# Patient Record
Sex: MALE | Race: ASIAN | NOT HISPANIC OR LATINO | Employment: FULL TIME | URBAN - METROPOLITAN AREA
[De-identification: names, ages, dates, MRNs, and addresses within clinical notes are randomized per-mention and may not be internally consistent; named-entity substitution may affect disease eponyms.]

---

## 2019-12-16 ENCOUNTER — OFFICE VISIT (OUTPATIENT)
Dept: FAMILY MEDICINE CLINIC | Facility: CLINIC | Age: 39
End: 2019-12-16

## 2019-12-16 VITALS
OXYGEN SATURATION: 96 % | DIASTOLIC BLOOD PRESSURE: 100 MMHG | RESPIRATION RATE: 16 BRPM | BODY MASS INDEX: 52.49 KG/M2 | WEIGHT: 278 LBS | HEIGHT: 61 IN | HEART RATE: 120 BPM | TEMPERATURE: 98 F | SYSTOLIC BLOOD PRESSURE: 152 MMHG

## 2019-12-16 DIAGNOSIS — M25.572 CHRONIC PAIN OF BOTH ANKLES: ICD-10-CM

## 2019-12-16 DIAGNOSIS — G47.30 SLEEP APNEA, UNSPECIFIED TYPE: ICD-10-CM

## 2019-12-16 DIAGNOSIS — Z13.1 SCREENING FOR DIABETES MELLITUS: ICD-10-CM

## 2019-12-16 DIAGNOSIS — R06.2 WHEEZING: ICD-10-CM

## 2019-12-16 DIAGNOSIS — G89.29 CHRONIC PAIN OF BOTH ANKLES: ICD-10-CM

## 2019-12-16 DIAGNOSIS — I10 ESSENTIAL HYPERTENSION: Primary | ICD-10-CM

## 2019-12-16 DIAGNOSIS — J30.9 ALLERGIC RHINITIS, UNSPECIFIED SEASONALITY, UNSPECIFIED TRIGGER: ICD-10-CM

## 2019-12-16 DIAGNOSIS — M25.571 CHRONIC PAIN OF BOTH ANKLES: ICD-10-CM

## 2019-12-16 DIAGNOSIS — E66.01 MORBID OBESITY WITH BMI OF 50.0-59.9, ADULT (HCC): ICD-10-CM

## 2019-12-16 PROCEDURE — 94640 AIRWAY INHALATION TREATMENT: CPT | Performed by: PHYSICIAN ASSISTANT

## 2019-12-16 PROCEDURE — 1036F TOBACCO NON-USER: CPT | Performed by: PHYSICIAN ASSISTANT

## 2019-12-16 PROCEDURE — 99204 OFFICE O/P NEW MOD 45 MIN: CPT | Performed by: PHYSICIAN ASSISTANT

## 2019-12-16 PROCEDURE — 3725F SCREEN DEPRESSION PERFORMED: CPT | Performed by: PHYSICIAN ASSISTANT

## 2019-12-16 PROCEDURE — 3008F BODY MASS INDEX DOCD: CPT | Performed by: PHYSICIAN ASSISTANT

## 2019-12-16 RX ORDER — ALBUTEROL SULFATE 2.5 MG/3ML
2.5 SOLUTION RESPIRATORY (INHALATION) ONCE
Status: COMPLETED | OUTPATIENT
Start: 2019-12-16 | End: 2019-12-16

## 2019-12-16 RX ORDER — HYDROCHLOROTHIAZIDE 25 MG/1
25 TABLET ORAL DAILY
Qty: 90 TABLET | Refills: 3 | Status: SHIPPED | OUTPATIENT
Start: 2019-12-16 | End: 2021-01-18 | Stop reason: SDUPTHER

## 2019-12-16 RX ORDER — AMLODIPINE BESYLATE 10 MG/1
10 TABLET ORAL DAILY
Qty: 90 TABLET | Refills: 3 | Status: SHIPPED | OUTPATIENT
Start: 2019-12-16 | End: 2021-01-18 | Stop reason: SDUPTHER

## 2019-12-16 RX ORDER — NAPROXEN 500 MG/1
500 TABLET ORAL 2 TIMES DAILY PRN
Qty: 60 TABLET | Refills: 2 | Status: SHIPPED | OUTPATIENT
Start: 2019-12-16 | End: 2021-05-24 | Stop reason: SDUPTHER

## 2019-12-16 RX ORDER — MONTELUKAST SODIUM 10 MG/1
10 TABLET ORAL
Qty: 90 TABLET | Refills: 3 | Status: SHIPPED | OUTPATIENT
Start: 2019-12-16 | End: 2021-01-18 | Stop reason: SDUPTHER

## 2019-12-16 RX ORDER — ALBUTEROL SULFATE 90 UG/1
2 AEROSOL, METERED RESPIRATORY (INHALATION) EVERY 6 HOURS PRN
Qty: 1 INHALER | Refills: 1 | Status: SHIPPED | OUTPATIENT
Start: 2019-12-16 | End: 2020-04-21 | Stop reason: SDUPTHER

## 2019-12-16 RX ADMIN — ALBUTEROL SULFATE 2.5 MG: 2.5 SOLUTION RESPIRATORY (INHALATION) at 10:04

## 2019-12-16 NOTE — ASSESSMENT & PLAN NOTE
Refilled amlodipine and hydrochlorothiazide  Recommend following up in 1 week to recheck blood pressure to see if medication adjustment is needed

## 2019-12-16 NOTE — PROGRESS NOTES
Mini neb  Performed by: Purnima Cristobal MA  Authorized by: Christal Fischer PA-C     Number of treatments:  1  Treatment 1:   Pre-Procedure     Symptoms:  Wheezing and shortness of breath    Lung Sounds:  Wheezing    HR:  120    RR:  18    SP02:  96    Medication Administered:  Albuterol 2 5 mg  Post-Procedure     Symptoms:  Wheezing and shortness of breath    Lung sounds:  Clear to auscultation    HR:  116    RR:  18    SP02:  98

## 2019-12-16 NOTE — ASSESSMENT & PLAN NOTE
BMI Counseling: Body mass index is 52 53 kg/m²  The BMI is above normal  Nutrition recommendations include reducing portion sizes, decreasing overall calorie intake, 3-5 servings of fruits/vegetables daily, consuming healthier snacks, decreasing soda and/or juice intake, moderation in carbohydrate intake, increasing intake of lean protein, reducing intake of saturated fat and trans fat and reducing intake of cholesterol  Exercise recommendations include moderate aerobic physical activity for 150 minutes/week and exercising 3-5 times per week

## 2019-12-16 NOTE — PROGRESS NOTES
Assessment/Plan:    Essential hypertension  Refilled amlodipine and hydrochlorothiazide  Recommend following up in 1 week to recheck blood pressure to see if medication adjustment is needed  Chronic pain of both ankles  No significant change in symptoms  Recommend continuing with naproxen at this time  Did offer referral to Podiatry, patient states that he wants to get his blood pressure under control, improve his weight, and be evaluated for sleep apnea before he will follow up with Podiatry  Morbid obesity with BMI of 50 0-59 9, adult ([facility])  BMI Counseling: Body mass index is 52 53 kg/m²  The BMI is above normal  Nutrition recommendations include reducing portion sizes, decreasing overall calorie intake, 3-5 servings of fruits/vegetables daily, consuming healthier snacks, decreasing soda and/or juice intake, moderation in carbohydrate intake, increasing intake of lean protein, reducing intake of saturated fat and trans fat and reducing intake of cholesterol  Exercise recommendations include moderate aerobic physical activity for 150 minutes/week and exercising 3-5 times per week  Mild wheezing was noted on physical exam and cleared with albuterol treatment  Because of his history of allergic rhinitis, will send over prescription for Singulair to see if this does help with allergy, and possible asthma symptoms  Will also send over rescue inhaler to be used as needed  If it seems like rescue inhaler is being used on a daily basis, may have to start on a maintenance medication  Diagnoses and all orders for this visit:    Essential hypertension  -     CBC and differential; Future  -     Comprehensive metabolic panel; Future  -     Lipid panel; Future  -     Microalbumin / creatinine urine ratio; Future  -     amLODIPine (NORVASC) 10 mg tablet; Take 1 tablet (10 mg total) by mouth daily  -     hydrochlorothiazide (HYDRODIURIL) 25 mg tablet;  Take 1 tablet (25 mg total) by mouth daily    Wheezing  - albuterol inhalation solution 2 5 mg  -     albuterol (VENTOLIN HFA) 90 mcg/act inhaler; Inhale 2 puffs every 6 (six) hours as needed for wheezing  -     Mini neb    Sleep apnea, unspecified type  -     Ambulatory referral to Sleep Medicine; Future    Chronic pain of both ankles  -     naproxen (NAPROSYN) 500 mg tablet; Take 1 tablet (500 mg total) by mouth 2 (two) times a day as needed for moderate pain    Allergic rhinitis, unspecified seasonality, unspecified trigger  -     montelukast (SINGULAIR) 10 mg tablet; Take 1 tablet (10 mg total) by mouth daily at bedtime    Screening for diabetes mellitus  -     Hemoglobin A1C; Future    Morbid obesity with BMI of 50 0-59 9, adult ([facility])          Subjective:      Patient ID: Ulises Palomares is a 42 y o  male  24-year-old male presenting to establish care  Patient has been diagnosed with hypertension in the past   Has been prescribed amlodipine 10 mg daily, hydrochlorothiazide 25 mg daily in the past   States it has been several months since he has been on medication  Does not recall the last time he saw a medical provider  Blood pressure was controlled with current medication regimen  Patient does admit to poor dietary habits  Currently his wife is being evaluated for bariatric surgery, and he is following her diet plan  Tries exercise when he can, but does have concerns with chronic ankle pain  States that pain has been going on since he was 15years old  Comes and goes  Has followed up with Podiatry in the past, and they wanted to do surgery, but patient declined  Currently uses naproxen as needed which she states helps with the pain  Patient has also been diagnosed with allergic rhinitis  States he has tried Claritin and Flonase in the past   Over the last couple weeks he has noticed an increase in shortness of breath and wheezing  States that symptoms have been getting better  Did use nebulizer treatment which did help with his breathing    Has never been diagnosed with asthma  The following portions of the patient's history were reviewed and updated as appropriate:   He  has a past medical history of Hypertension  He   Patient Active Problem List    Diagnosis Date Noted    Essential hypertension 12/16/2019    Sleep apnea 12/16/2019    Allergic rhinitis 12/16/2019    Morbid obesity with BMI of 50 0-59 9, adult ([facility] [address] ) 12/16/2019    Chronic pain of both ankles 12/16/2019     He  has no past surgical history on file  His family history is not on file  He  reports that he has never smoked  He has never used smokeless tobacco  He reports that he does not drink alcohol or use drugs  Current Outpatient Medications   Medication Sig Dispense Refill    albuterol (VENTOLIN HFA) 90 mcg/act inhaler Inhale 2 puffs every 6 (six) hours as needed for wheezing 1 Inhaler 1    amLODIPine (NORVASC) 10 mg tablet Take 1 tablet (10 mg total) by mouth daily 90 tablet 3    hydrochlorothiazide (HYDRODIURIL) 25 mg tablet Take 1 tablet (25 mg total) by mouth daily 90 tablet 3    montelukast (SINGULAIR) 10 mg tablet Take 1 tablet (10 mg total) by mouth daily at bedtime 90 tablet 3    naproxen (NAPROSYN) 500 mg tablet Take 1 tablet (500 mg total) by mouth 2 (two) times a day as needed for moderate pain 60 tablet 2     No current facility-administered medications for this visit  He has No Known Allergies       Review of Systems   Constitutional: Negative for chills, diaphoresis, fatigue and fever  HENT: Positive for congestion, postnasal drip and rhinorrhea  Negative for ear pain, nosebleeds, sneezing and sore throat  Eyes: Negative for pain and visual disturbance  Respiratory: Positive for chest tightness, shortness of breath and wheezing  Negative for cough  Cardiovascular: Negative for chest pain, palpitations and leg swelling  Gastrointestinal: Negative for abdominal pain, diarrhea, nausea and vomiting     Endocrine: Negative for cold intolerance, heat intolerance, polydipsia, polyphagia and polyuria  Genitourinary: Negative for dysuria, flank pain and hematuria  Musculoskeletal: Positive for arthralgias  Negative for gait problem, joint swelling and neck stiffness  Skin: Negative for rash and wound  Neurological: Negative for dizziness, syncope, speech difficulty, weakness, numbness and headaches  Psychiatric/Behavioral: Negative for dysphoric mood and sleep disturbance  The patient is not nervous/anxious  Objective:      /100 (BP Location: Right arm, Patient Position: Sitting, Cuff Size: Large)   Pulse (!) 120   Temp 98 °F (36 7 °C) (Temporal)   Resp 16   Ht 5' 1" (1 549 m)   Wt 126 kg (278 lb)   SpO2 96%   BMI 52 53 kg/m²          Physical Exam   Constitutional: He is oriented to person, place, and time  He appears well-developed and well-nourished  No distress  HENT:   Right Ear: External ear normal    Left Ear: External ear normal    Nose: Mucosal edema present  Mouth/Throat: Oropharynx is clear and moist    Eyes: Pupils are equal, round, and reactive to light  Conjunctivae and EOM are normal    Neck: Normal range of motion  Neck supple  Cardiovascular: Normal rate, regular rhythm and normal heart sounds  Exam reveals no gallop and no friction rub  No murmur heard  Pulmonary/Chest: Effort normal  No respiratory distress  He has wheezes  He has no rales  Abdominal: Soft  Bowel sounds are normal  He exhibits no distension  There is no tenderness  There is no rebound and no guarding  Musculoskeletal: Normal range of motion  He exhibits no edema  Lymphadenopathy:     He has no cervical adenopathy  Neurological: He is alert and oriented to person, place, and time  He displays normal reflexes  No cranial nerve deficit  He exhibits normal muscle tone  Coordination normal    Skin: Skin is warm and dry  He is not diaphoretic  No erythema  Psychiatric: He has a normal mood and affect   His behavior is normal  Thought content normal    Nursing note and vitals reviewed

## 2019-12-16 NOTE — ASSESSMENT & PLAN NOTE
No significant change in symptoms  Recommend continuing with naproxen at this time  Did offer referral to Podiatry, patient states that he wants to get his blood pressure under control, improve his weight, and be evaluated for sleep apnea before he will follow up with Podiatry

## 2020-04-21 DIAGNOSIS — R06.2 WHEEZING: ICD-10-CM

## 2020-04-21 RX ORDER — ALBUTEROL SULFATE 90 UG/1
2 AEROSOL, METERED RESPIRATORY (INHALATION) EVERY 6 HOURS PRN
Qty: 1 INHALER | Refills: 1 | Status: SHIPPED | OUTPATIENT
Start: 2020-04-21 | End: 2020-07-13

## 2020-04-22 ENCOUNTER — TELEMEDICINE (OUTPATIENT)
Dept: FAMILY MEDICINE CLINIC | Facility: CLINIC | Age: 40
End: 2020-04-22

## 2020-04-22 VITALS — BODY MASS INDEX: 46.26 KG/M2 | HEIGHT: 61 IN | WEIGHT: 245 LBS

## 2020-04-22 DIAGNOSIS — J30.9 ALLERGIC RHINITIS, UNSPECIFIED SEASONALITY, UNSPECIFIED TRIGGER: Primary | ICD-10-CM

## 2020-04-22 DIAGNOSIS — J45.20 MILD INTERMITTENT ASTHMA WITHOUT COMPLICATION: ICD-10-CM

## 2020-04-22 PROCEDURE — G2012 BRIEF CHECK IN BY MD/QHP: HCPCS | Performed by: PHYSICIAN ASSISTANT

## 2020-04-22 RX ORDER — FLUTICASONE PROPIONATE 50 MCG
2 SPRAY, SUSPENSION (ML) NASAL DAILY
Qty: 1 BOTTLE | Refills: 11 | Status: SHIPPED | OUTPATIENT
Start: 2020-04-22 | End: 2022-04-12 | Stop reason: SDUPTHER

## 2020-05-28 ENCOUNTER — OFFICE VISIT (OUTPATIENT)
Dept: SLEEP CENTER | Facility: CLINIC | Age: 40
End: 2020-05-28
Payer: COMMERCIAL

## 2020-05-28 VITALS
HEIGHT: 61 IN | SYSTOLIC BLOOD PRESSURE: 128 MMHG | BODY MASS INDEX: 51.16 KG/M2 | HEART RATE: 100 BPM | DIASTOLIC BLOOD PRESSURE: 100 MMHG | WEIGHT: 271 LBS

## 2020-05-28 DIAGNOSIS — G47.30 SLEEP APNEA, UNSPECIFIED TYPE: ICD-10-CM

## 2020-05-28 PROCEDURE — 99203 OFFICE O/P NEW LOW 30 MIN: CPT | Performed by: INTERNAL MEDICINE

## 2020-05-28 PROCEDURE — 3074F SYST BP LT 130 MM HG: CPT | Performed by: INTERNAL MEDICINE

## 2020-05-28 PROCEDURE — 3080F DIAST BP >= 90 MM HG: CPT | Performed by: INTERNAL MEDICINE

## 2020-05-28 PROCEDURE — 3008F BODY MASS INDEX DOCD: CPT | Performed by: INTERNAL MEDICINE

## 2020-07-11 DIAGNOSIS — R06.2 WHEEZING: ICD-10-CM

## 2020-07-13 RX ORDER — ALBUTEROL SULFATE 90 UG/1
AEROSOL, METERED RESPIRATORY (INHALATION)
Qty: 8.5 G | Refills: 0 | Status: SHIPPED | OUTPATIENT
Start: 2020-07-13 | End: 2021-05-24 | Stop reason: SDUPTHER

## 2020-08-27 ENCOUNTER — HOSPITAL ENCOUNTER (OUTPATIENT)
Dept: SLEEP CENTER | Facility: CLINIC | Age: 40
Discharge: HOME | End: 2020-08-27
Payer: COMMERCIAL

## 2020-08-27 DIAGNOSIS — G47.30 SLEEP APNEA, UNSPECIFIED TYPE: ICD-10-CM

## 2020-08-27 PROCEDURE — 95810 POLYSOM 6/> YRS 4/> PARAM: CPT

## 2020-08-28 NOTE — PROGRESS NOTES
Sleep Study Documentation    Pre-Sleep Study       Sleep testing procedure explained to patient:YES    Patient napped prior to study:NO    [address] [place] worker after 12PM   Caffeine use:NO    Alcohol:Dayshift workers after 5PM: Alcohol use:NO    Typical day for patient:YES       Study Documentation    Sleep Study Indications:    Snoring   Nocturnal choking   Excessive daytime sleepiness   Hypertension    Sleep Study: Diagnostic       Snore: Moderate to loud at times    Supplemental O2: no    Minimum SaO2:  51%  Baseline SaO2:   89%    EKG abnormalities: yes (rare pause/skipped beat--see epochs 733, 903-04 for example)  EEG abnormalities: no    Sleep Study Recorded < 2 hours: N/A    Sleep Study Recorded > 2 hours but incomplete study: N/A    Sleep Study Recorded 6 hours but no sleep obtained: NO    Patient classification: employed       Post-Sleep Study    Medication used at bedtime or during sleep study:YES over the counter sleep aid and other prescription medications    Patient reports time it took to fall asleep:less than 20 minutes    Patient reports waking up during study:1 to 2 times  Patient reports returning to sleep without difficulty  Patient reports sleeping 4 to 6 hours with dreaming  Patient reports sleep during study:typical    Patient rated sleepiness: Somewhat sleepy or tired    PAP treatment:no

## 2020-09-01 DIAGNOSIS — G47.33 OSA (OBSTRUCTIVE SLEEP APNEA): Primary | ICD-10-CM

## 2020-09-03 ENCOUNTER — TELEPHONE (OUTPATIENT)
Dept: SLEEP CENTER | Facility: CLINIC | Age: 40
End: 2020-09-03

## 2020-09-03 DIAGNOSIS — Z20.822 ENCOUNTER FOR LABORATORY TESTING FOR COVID-19 VIRUS: Primary | ICD-10-CM

## 2020-09-03 NOTE — TELEPHONE ENCOUNTER
In my previous conversation with Laurance Essex, she would like to use [employer] [facility] equipment  I did investigate, and [employer] does not handle respiratory equipment in this area      Laurance Essex states they can use [place]  Scheduled DME set up after follow up with Dr Gongora Dear 11/25/2020  Compliance follow up scheduled 1/6/2021

## 2020-09-03 NOTE — TELEPHONE ENCOUNTER
Spoke with patients wife advised sleep study resulted, unfortunately I do not have permission to speak with her, she will give patient message to call      Sleep study reveals sever EVE, recommend titration study

## 2020-09-03 NOTE — TELEPHONE ENCOUNTER
received message from patient, ok to speak with his wife Ra Bergeron  I spoke with Ra Bergeron, advised above  States she already got a call and scheduled titration study  Discussed COVID protocol:  Patient agreeable to have COVID test on 11/6/2020, for PAP study on 11/16/2020  We ask that you limit interpersonal interactions as much as possible and observe all social distancing and masking precautions from the time of your testing to the time of your study      COVID testing order placed    COVID letter sent

## 2020-11-06 DIAGNOSIS — Z20.822 ENCOUNTER FOR LABORATORY TESTING FOR COVID-19 VIRUS: ICD-10-CM

## 2020-11-06 PROCEDURE — U0003 INFECTIOUS AGENT DETECTION BY NUCLEIC ACID (DNA OR RNA); SEVERE ACUTE RESPIRATORY SYNDROME CORONAVIRUS 2 (SARS-COV-2) (CORONAVIRUS DISEASE [COVID-19]), AMPLIFIED PROBE TECHNIQUE, MAKING USE OF HIGH THROUGHPUT TECHNOLOGIES AS DESCRIBED BY CMS-2020-01-R: HCPCS | Performed by: INTERNAL MEDICINE

## 2020-11-07 LAB — SARS-COV-2 RNA SPEC QL NAA+PROBE: NOT DETECTED

## 2020-11-16 ENCOUNTER — HOSPITAL ENCOUNTER (OUTPATIENT)
Dept: SLEEP CENTER | Facility: CLINIC | Age: 40
Discharge: HOME | End: 2020-11-16
Payer: COMMERCIAL

## 2020-11-16 DIAGNOSIS — G47.33 OSA (OBSTRUCTIVE SLEEP APNEA): ICD-10-CM

## 2020-11-16 PROCEDURE — 95811 POLYSOM 6/>YRS CPAP 4/> PARM: CPT

## 2020-11-21 DIAGNOSIS — G47.33 OSA (OBSTRUCTIVE SLEEP APNEA): Primary | ICD-10-CM

## 2020-11-23 ENCOUNTER — TELEPHONE (OUTPATIENT)
Dept: SLEEP CENTER | Facility: CLINIC | Age: 40
End: 2020-11-23

## 2020-11-25 ENCOUNTER — OFFICE VISIT (OUTPATIENT)
Dept: SLEEP CENTER | Facility: CLINIC | Age: 40
End: 2020-11-25
Payer: COMMERCIAL

## 2020-11-25 ENCOUNTER — TELEPHONE (OUTPATIENT)
Dept: SLEEP CENTER | Facility: CLINIC | Age: 40
End: 2020-11-25

## 2020-11-25 VITALS
SYSTOLIC BLOOD PRESSURE: 122 MMHG | WEIGHT: 271 LBS | BODY MASS INDEX: 51.16 KG/M2 | DIASTOLIC BLOOD PRESSURE: 84 MMHG | HEART RATE: 110 BPM | HEIGHT: 61 IN

## 2020-11-25 DIAGNOSIS — G47.33 OSA (OBSTRUCTIVE SLEEP APNEA): Primary | ICD-10-CM

## 2020-11-25 PROCEDURE — 3008F BODY MASS INDEX DOCD: CPT | Performed by: INTERNAL MEDICINE

## 2020-11-25 PROCEDURE — 99213 OFFICE O/P EST LOW 20 MIN: CPT | Performed by: INTERNAL MEDICINE

## 2020-11-25 PROCEDURE — 3079F DIAST BP 80-89 MM HG: CPT | Performed by: INTERNAL MEDICINE

## 2020-11-25 PROCEDURE — 3074F SYST BP LT 130 MM HG: CPT | Performed by: INTERNAL MEDICINE

## 2020-11-25 PROCEDURE — 1036F TOBACCO NON-USER: CPT | Performed by: INTERNAL MEDICINE

## 2021-01-06 ENCOUNTER — OFFICE VISIT (OUTPATIENT)
Dept: SLEEP CENTER | Facility: CLINIC | Age: 41
End: 2021-01-06
Payer: COMMERCIAL

## 2021-01-06 VITALS
HEIGHT: 61 IN | WEIGHT: 269 LBS | BODY MASS INDEX: 50.79 KG/M2 | SYSTOLIC BLOOD PRESSURE: 130 MMHG | HEART RATE: 97 BPM | DIASTOLIC BLOOD PRESSURE: 86 MMHG

## 2021-01-06 DIAGNOSIS — G47.33 OSA (OBSTRUCTIVE SLEEP APNEA): Primary | ICD-10-CM

## 2021-01-06 PROCEDURE — 1036F TOBACCO NON-USER: CPT | Performed by: INTERNAL MEDICINE

## 2021-01-06 PROCEDURE — 99213 OFFICE O/P EST LOW 20 MIN: CPT | Performed by: INTERNAL MEDICINE

## 2021-01-06 NOTE — PROGRESS NOTES
Progress Note - Sleep Center   Amanda Nelson :1980 MRN: [id number]      Reason for Visit:  47 y o male here for PAP compliance check    Assessment:  Doing well with new PAP device  His only problem is mask leak, due to his high pressure  Sleep quality is improved and the patient feels less drowsy  He no longer complains of drowsiness while driving  Compliance data show utilization for greater than or equal to 70% of nights, for greater than or equal to 4 hours per night  Plan:  Adequate compliance and successful treatment  [facility] will work on the problem of mask leak caused by high pressure  Follow up: One year    History of Present Illness:  History of EVE on PAP therapy  Meets adequate compliance  His only difficulty is mask leak which causes a whistling sound throughout the night  He is taking measures to keep his mask from leaking further  Review of Systems      Genitourinary none   Cardiology ankle/leg swelling   Gastrointestinal none   Neurology none   Constitutional none   Integumentary none   Psychiatry none   Musculoskeletal legs twitching/jerking   Pulmonary none   ENT none   Endocrine none   Hematological none           I have reviewed and updated the review of systems as necessary    Historical Information    Past Medical History:   Diagnosis Date    Hypertension          No past surgical history on file        Social History     Socioeconomic History    Marital status: /Civil Union     Spouse name: None    Number of children: None    Years of education: None    Highest education level: None   Occupational History    None   Social Needs    Financial resource strain: None    Food insecurity     Worry: None     Inability: None    Transportation needs     Medical: None     Non-medical: None   Tobacco Use    Smoking status: Never Smoker    Smokeless tobacco: Never Used   Substance and Sexual Activity    Alcohol use: No    Drug use: No    Sexual activity: None Lifestyle    Physical activity     Days per week: None     Minutes per session: None    Stress: None   Relationships    Social connections     Talks on phone: None     Gets together: None     Attends Shinto service: None     Active member of club or organization: None     Attends meetings of clubs or organizations: None     Relationship status: None    Intimate partner violence     Fear of current or ex partner: None     Emotionally abused: None     Physically abused: None     Forced sexual activity: None   Other Topics Concern    None   Social History Narrative    None         No family history on file  Medications/Allergies:      Current Outpatient Medications:     albuterol (PROVENTIL HFA,VENTOLIN HFA) 90 mcg/act inhaler, INHALE 2 PUFFS BY MOUTH EVERY 6 HOURS AS NEEDED FOR WHEEZING, Disp: 8 5 g, Rfl: 0    amLODIPine (NORVASC) 10 mg tablet, Take 1 tablet (10 mg total) by mouth daily, Disp: 90 tablet, Rfl: 3    fluticasone (FLONASE) 50 mcg/act nasal spray, 2 sprays into each nostril daily, Disp: 1 Bottle, Rfl: 11    hydrochlorothiazide (HYDRODIURIL) 25 mg tablet, Take 1 tablet (25 mg total) by mouth daily, Disp: 90 tablet, Rfl: 3    montelukast (SINGULAIR) 10 mg tablet, Take 1 tablet (10 mg total) by mouth daily at bedtime, Disp: 90 tablet, Rfl: 3    naproxen (NAPROSYN) 500 mg tablet, Take 1 tablet (500 mg total) by mouth 2 (two) times a day as needed for moderate pain, Disp: 60 tablet, Rfl: 2      Objective    Vital Signs:   Vitals:    01/06/21 0752   BP: 130/86   Pulse: 97     [place] Sleepiness Scale: Total score: 7        Physical Exam:    General: Alert, appropriate, cooperative, overweight    Head: NC/AT    Skin: Warm, dry    Neuro: No motor abnormalities, cranial nerves appear intact    Extremity: No clubbing, cyanosis    PAP setting:   BiPAP 25/21 cm  DME Provider:    [employer]  Test results:  AHI = 134 0    Counseling / Coordination of Care  Total clinic time spent today 10 minutes  A description of the counseling / coordination of care: Maintain compliance  Discussed equipment  RENNY Villarreal    Board Certified Sleep Specialist

## 2021-01-13 ENCOUNTER — TELEPHONE (OUTPATIENT)
Dept: FAMILY MEDICINE CLINIC | Facility: CLINIC | Age: 41
End: 2021-01-13

## 2021-01-18 ENCOUNTER — TELEPHONE (OUTPATIENT)
Dept: FAMILY MEDICINE CLINIC | Facility: CLINIC | Age: 41
End: 2021-01-18

## 2021-01-18 DIAGNOSIS — I10 ESSENTIAL HYPERTENSION: ICD-10-CM

## 2021-01-18 DIAGNOSIS — J30.9 ALLERGIC RHINITIS, UNSPECIFIED SEASONALITY, UNSPECIFIED TRIGGER: ICD-10-CM

## 2021-01-18 RX ORDER — HYDROCHLOROTHIAZIDE 25 MG/1
25 TABLET ORAL DAILY
Qty: 30 TABLET | Refills: 0 | Status: SHIPPED | OUTPATIENT
Start: 2021-01-18 | End: 2021-01-25

## 2021-01-18 RX ORDER — AMLODIPINE BESYLATE 10 MG/1
10 TABLET ORAL DAILY
Qty: 30 TABLET | Refills: 0 | Status: SHIPPED | OUTPATIENT
Start: 2021-01-18 | End: 2021-02-16

## 2021-01-18 RX ORDER — MONTELUKAST SODIUM 10 MG/1
10 TABLET ORAL
Qty: 30 TABLET | Refills: 0 | Status: SHIPPED | OUTPATIENT
Start: 2021-01-18 | End: 2021-02-16

## 2021-01-18 NOTE — TELEPHONE ENCOUNTER
Medications requested by patient's wife Amlodipine 10mg, hydrochlorothiazide 25mg, and montelukast 10mg

## 2021-01-18 NOTE — TELEPHONE ENCOUNTER
Meds were filled just this last time  Has not been seen here since April 2020  Does have appointment on 1/25/21  He is to keep that appointment   Thanks, Dr Annamarie Strauss

## 2021-01-25 ENCOUNTER — OFFICE VISIT (OUTPATIENT)
Dept: FAMILY MEDICINE CLINIC | Facility: CLINIC | Age: 41
End: 2021-01-25

## 2021-01-25 VITALS
TEMPERATURE: 97.4 F | HEART RATE: 105 BPM | BODY MASS INDEX: 50.39 KG/M2 | WEIGHT: 266.9 LBS | HEIGHT: 61 IN | DIASTOLIC BLOOD PRESSURE: 92 MMHG | RESPIRATION RATE: 18 BRPM | SYSTOLIC BLOOD PRESSURE: 138 MMHG | OXYGEN SATURATION: 99 %

## 2021-01-25 DIAGNOSIS — I10 ESSENTIAL HYPERTENSION: ICD-10-CM

## 2021-01-25 DIAGNOSIS — J45.20 MILD INTERMITTENT ASTHMA WITHOUT COMPLICATION: ICD-10-CM

## 2021-01-25 DIAGNOSIS — G89.29 CHRONIC PAIN OF BOTH ANKLES: ICD-10-CM

## 2021-01-25 DIAGNOSIS — Z00.00 ANNUAL PHYSICAL EXAM: ICD-10-CM

## 2021-01-25 DIAGNOSIS — Z23 ENCOUNTER FOR IMMUNIZATION: Primary | ICD-10-CM

## 2021-01-25 DIAGNOSIS — G47.30 SLEEP APNEA, UNSPECIFIED TYPE: ICD-10-CM

## 2021-01-25 DIAGNOSIS — M25.571 CHRONIC PAIN OF BOTH ANKLES: ICD-10-CM

## 2021-01-25 DIAGNOSIS — E66.01 MORBID OBESITY WITH BMI OF 50.0-59.9, ADULT (HCC): ICD-10-CM

## 2021-01-25 DIAGNOSIS — M25.572 CHRONIC PAIN OF BOTH ANKLES: ICD-10-CM

## 2021-01-25 PROCEDURE — 1036F TOBACCO NON-USER: CPT | Performed by: NURSE PRACTITIONER

## 2021-01-25 PROCEDURE — 3725F SCREEN DEPRESSION PERFORMED: CPT | Performed by: NURSE PRACTITIONER

## 2021-01-25 PROCEDURE — 3008F BODY MASS INDEX DOCD: CPT | Performed by: INTERNAL MEDICINE

## 2021-01-25 PROCEDURE — 3080F DIAST BP >= 90 MM HG: CPT | Performed by: NURSE PRACTITIONER

## 2021-01-25 PROCEDURE — 3075F SYST BP GE 130 - 139MM HG: CPT | Performed by: NURSE PRACTITIONER

## 2021-01-25 PROCEDURE — 99213 OFFICE O/P EST LOW 20 MIN: CPT | Performed by: NURSE PRACTITIONER

## 2021-01-25 PROCEDURE — 3008F BODY MASS INDEX DOCD: CPT | Performed by: NURSE PRACTITIONER

## 2021-01-25 RX ORDER — HYDROCHLOROTHIAZIDE 25 MG/1
50 TABLET ORAL DAILY
Qty: 30 TABLET | Refills: 0 | Status: SHIPPED | OUTPATIENT
Start: 2021-01-25 | End: 2021-02-12

## 2021-01-25 NOTE — ASSESSMENT & PLAN NOTE
Unstable, climbing steadily  At max CCB, will increase HCTZ  Pt agrees w/plan  Discussed need to lose weight and restrict salt

## 2021-01-25 NOTE — ASSESSMENT & PLAN NOTE
Pt ready to make a change, non-surgical   Wants diet information  Discussed all facets of BMI management plan as documented below

## 2021-01-25 NOTE — PATIENT INSTRUCTIONS
Weight Management   WHAT YOU NEED TO KNOW:   Being overweight increases your risk of health conditions such as heart disease, high blood pressure, type 2 diabetes, and certain types of cancer  It can also increase your risk for osteoarthritis, sleep apnea, and other respiratory problems  Aim for a slow, steady weight loss  Even a small amount of weight loss can lower your risk of health problems  DISCHARGE INSTRUCTIONS:   How to lose weight safely:  A safe and healthy way to lose weight is to eat fewer calories and get regular exercise  · You can lose up about 1 pound a week by decreasing the number of calories you eat by 500 calories each day  You can decrease calories by eating smaller portion sizes or by cutting out high-calorie foods  Read labels to find out how many calories are in the foods you eat  · You can also burn calories with exercise such as walking, swimming, or biking  You will be more likely to keep weight off if you make these changes part of your lifestyle  Exercise at least 30 minutes per day on most days of the week  You can also fit in more physical activity by taking the stairs instead of the elevator or parking farther away from stores  Ask your healthcare provider about the best exercise plan for you  Healthy meal plan for weight management:  A healthy meal plan includes a variety of foods, contains fewer calories, and helps you stay healthy  A healthy meal plan includes the following:     · Eat whole-grain foods more often  A healthy meal plan should contain fiber  Fiber is the part of grains, fruits, and vegetables that is not broken down by your body  Whole-grain foods are healthy and provide extra fiber in your diet  Some examples of whole-grain foods are whole-wheat breads and pastas, oatmeal, brown rice, and bulgur  · Eat a variety of vegetables every day  Include dark, leafy greens such as spinach, kale, kimani greens, and mustard greens   Eat yellow and orange vegetables such as carrots, sweet potatoes, and winter squash  · Eat a variety of fruits every day  Choose fresh or canned fruit (canned in its own juice or light syrup) instead of juice  Fruit juice has very little or no fiber  · Eat low-fat dairy foods  Drink fat-free (skim) milk or 1% milk  Eat fat-free yogurt and low-fat cottage cheese  Try low-fat cheeses such as mozzarella and other reduced-fat cheeses  · Choose meat and other protein foods that are low in fat  Choose beans or other legumes such as split peas or lentils  Choose fish, skinless poultry (chicken or turkey), or lean cuts of red meat (beef or pork)  Before you cook meat or poultry, cut off any visible fat  · Use less fat and oil  Try baking foods instead of frying them  Add less fat, such as margarine, sour cream, regular salad dressing, and mayonnaise to foods  Eat fewer high-fat foods  Some examples of high-fat foods include french fries, doughnuts, ice cream, and cakes  · Eat fewer sweets  Limit foods and drinks that are high in sugar  This includes candy, cookies, regular soda, and sweetened drinks  Ways to decrease calories:   · Eat smaller portions  ? Use a small plate with smaller servings  ? Do not eat second helpings  ? When you eat at a restaurant, ask for a box and place half of your meal in the box before you eat  ? Share an entrée with someone else  · Replace high-calorie snacks with healthy, low-calorie snacks  ? Choose fresh fruit, vegetables, fat-free rice cakes, or air-popped popcorn instead of potato chips, nuts, or chocolate  ? Choose water or calorie-free drinks instead of soda or sweetened drinks  · Do not shop for groceries when you are hungry  You may be more likely to make unhealthy food choices  Take a grocery list of healthy foods and shop after you have eaten  · Eat regular meals  Do not skip meals  Skipping meals can lead to overeating later in the day   This can make it harder for you to lose weight  Eat a healthy snack in place of a meal if you do not have time to eat a regular meal  Talk with a dietitian to help you create a meal plan and schedule that is right for you  Other things to consider as you try to lose weight:   · Be aware of situations that may give you the urge to overeat, such as eating while watching television  Find ways to avoid these situations  For example, read a book, go for a walk, or do crafts  · Meet with a weight loss support group or friends who are also trying to lose weight  This may help you stay motivated to continue working on your weight loss goals  © Copyright [address] Information is for End User's use only and may not be sold, redistributed or otherwise used for commercial purposes  All illustrations and images included in CareNotes® are the copyrighted property of [employer]  or [employer]   The above information is an  only  It is not intended as medical advice for individual conditions or treatments  Talk to your doctor, nurse or pharmacist before following any medical regimen to see if it is safe and effective for you  Wellness Visit for Adults   AMBULATORY CARE:   A wellness visit  is when you see your healthcare provider to get screened for health problems  Your healthcare provider will also give you advice on how to stay healthy  Write down your questions so you remember to ask them  Ask your healthcare provider how often you should have a wellness visit  What happens at a wellness visit:  Your healthcare provider will ask about your health, and your family history of health problems  This includes high blood pressure, heart disease, and cancer  He or she will ask if you have symptoms that concern you, if you smoke, and about your mood  You may also be asked about your intake of medicines, supplements, food, and alcohol  Any of the following may be done:  · Your weight  will be checked  Your height may also be checked so your body mass index (BMI) can be calculated  Your BMI shows if you are at a healthy weight  · Your blood pressure  and heart rate will be checked  Your temperature may also be checked  · Blood and urine tests  may be done  Blood tests may be done to check your cholesterol levels  Abnormal cholesterol levels increase your risk for heart disease and stroke  You may also need a blood or urine test to check for diabetes if you are at increased risk  Urine tests may be done to look for signs of an infection or kidney disease  · A physical exam  includes checking your heartbeat and lungs with a stethoscope  Your healthcare provider may also check your skin to look for sun damage  · Screening tests  may be recommended  A screening test is done to check for diseases that may not cause symptoms  The screening tests you may need depend on your age, gender, family history, and lifestyle habits  For example, colorectal screening may be recommended if you are 48years old or older  Screening tests you need if you are a woman:   · A Pap smear  is used to screen for cervical cancer  Pap smears are usually done every 3 to 5 years depending on your age  You may need them more often if you have had abnormal Pap smear test results in the past  Ask your healthcare provider how often you should have a Pap smear  · A mammogram  is an x-ray of your breasts to screen for breast cancer  Experts recommend mammograms every 2 years starting at age 48 years  You may need a mammogram at age 52 years or younger if you have an increased risk for breast cancer  Talk to your healthcare provider about when you should start having mammograms and how often you need them  Vaccines you may need:   · Get an influenza vaccine  every year  The influenza vaccine protects you from the flu  Several types of viruses cause the flu  The viruses change over time, so new vaccines are made each year      · Get a tetanus-diphtheria (Td) booster vaccine  every 10 years  This vaccine protects you against tetanus and diphtheria  Tetanus is a severe infection that may cause painful muscle spasms and lockjaw  Diphtheria is a severe bacterial infection that causes a thick covering in the back of your mouth and throat  · Get a human papillomavirus (HPV) vaccine  if you are female and aged 23 to 32 or male 23 to 24 and never received it  This vaccine protects you from HPV infection  HPV is the most common infection spread by sexual contact  HPV may also cause vaginal, penile, and anal cancers  · Get a pneumococcal vaccine  if you are aged 72 years or older  The pneumococcal vaccine is an injection given to protect you from pneumococcal disease  Pneumococcal disease is an infection caused by pneumococcal bacteria  The infection may cause pneumonia, meningitis, or an ear infection  · Get a shingles vaccine  if you are 60 or older, even if you have had shingles before  The shingles vaccine is an injection to protect you from the varicella-zoster virus  This is the same virus that causes chickenpox  Shingles is a painful rash that develops in people who had chickenpox or have been exposed to the virus  How to eat healthy:  My Plate is a model for planning healthy meals  It shows the types and amounts of foods that should go on your plate  Fruits and vegetables make up about half of your plate, and grains and protein make up the other half  A serving of dairy is included on the side of your plate  The amount of calories and serving sizes you need depends on your age, gender, weight, and height  Examples of healthy foods are listed below:  · Eat a variety of vegetables  such as dark green, red, and orange vegetables  You can also include canned vegetables low in sodium (salt) and frozen vegetables without added butter or sauces      · Eat a variety of fresh fruits , canned fruit in 100% juice, frozen fruit, and dried fruit     · Include whole grains  At least half of the grains you eat should be whole grains  Examples include whole-wheat bread, wheat pasta, brown rice, and whole-grain cereals such as oatmeal     · Eat a variety of protein foods such as seafood (fish and shellfish), lean meat, and poultry without skin (turkey and chicken)  Examples of lean meats include pork leg, shoulder, or tenderloin, and beef round, sirloin, tenderloin, and extra lean ground beef  Other protein foods include eggs and egg substitutes, beans, peas, soy products, nuts, and seeds  · Choose low-fat dairy products such as skim or 1% milk or low-fat yogurt, cheese, and cottage cheese  · Limit unhealthy fats  such as butter, hard margarine, and shortening  Exercise:  Exercise at least 30 minutes per day on most days of the week  Some examples of exercise include walking, biking, dancing, and swimming  You can also fit in more physical activity by taking the stairs instead of the elevator or parking farther away from stores  Include muscle strengthening activities 2 days each week  Regular exercise provides many health benefits  It helps you manage your weight, and decreases your risk for type 2 diabetes, heart disease, stroke, and high blood pressure  Exercise can also help improve your mood  Ask your healthcare provider about the best exercise plan for you  General health and safety guidelines:   · Do not smoke  Nicotine and other chemicals in cigarettes and cigars can cause lung damage  Ask your healthcare provider for information if you currently smoke and need help to quit  E-cigarettes or smokeless tobacco still contain nicotine  Talk to your healthcare provider before you use these products  · Limit alcohol  A drink of alcohol is 12 ounces of beer, 5 ounces of wine, or 1½ ounces of liquor  · Lose weight, if needed  Being overweight increases your risk of certain health conditions   These include heart disease, high blood pressure, type 2 diabetes, and certain types of cancer  · Protect your skin  Do not sunbathe or use tanning beds  Use sunscreen with a SPF 15 or higher  Apply sunscreen at least 15 minutes before you go outside  Reapply sunscreen every 2 hours  Wear protective clothing, hats, and sunglasses when you are outside  · Drive safely  Always wear your seatbelt  Make sure everyone in your car wears a seatbelt  A seatbelt can save your life if you are in an accident  Do not use your cell phone when you are driving  This could distract you and cause an accident  Pull over if you need to make a call or send a text message  · Practice safe sex  Use latex condoms if are sexually active and have more than one partner  Your healthcare provider may recommend screening tests for sexually transmitted infections (STIs)  · Wear helmets, lifejackets, and protective gear  Always wear a helmet when you ride a bike or motorcycle, go skiing, or play sports that could cause a head injury  Wear protective equipment when you play sports  Wear a lifejacket when you are on a boat or doing water sports  © Copyright [address] Information is for End User's use only and may not be sold, redistributed or otherwise used for commercial purposes  All illustrations and images included in CareNotes® are the copyrighted property of [employer]  or Manasa Arenas  The above information is an  only  It is not intended as medical advice for individual conditions or treatments  Talk to your doctor, nurse or pharmacist before following any medical regimen to see if it is safe and effective for you

## 2021-01-25 NOTE — ASSESSMENT & PLAN NOTE
Wears brace right ankle  Pain causes gait disturbance  Negative for edema, erythema  Posterior tibial pulse intact +3 bilaterally  Discussed activity better than no activity  Pt desires lifestyle changes/weight loss to get pain under control  No pharmacologic measures at this time

## 2021-01-25 NOTE — PROGRESS NOTES
Assessment/Plan:    Problem List Items Addressed This Visit        Respiratory    Sleep apnea     Stable  Cont CPAP and singulair  Relevant Orders    Ambulatory referral to Weight Management    Mild intermittent asthma without complication     Stable  Use inhaler PRN  Relevant Orders    Ambulatory referral to Weight Management       Cardiovascular and Mediastinum    Essential hypertension     Unstable, climbing steadily  At max CCB, will increase HCTZ  Pt agrees w/plan  Discussed need to lose weight and restrict salt  Relevant Medications    hydrochlorothiazide (HYDRODIURIL) 25 mg tablet    Other Relevant Orders    Ambulatory referral to Weight Management       Other    Morbid obesity with BMI of 50 0-59 9, adult ([facility] [address] )     Pt ready to make a change, non-surgical   Wants diet information  Discussed all facets of BMI management plan as documented below  Relevant Orders    Ambulatory referral to Weight Management    Chronic pain of both ankles     Wears brace right ankle  Pain causes gait disturbance  Negative for edema, erythema  Posterior tibial pulse intact +3 bilaterally  Discussed activity better than no activity  Pt desires lifestyle changes/weight loss to get pain under control  No pharmacologic measures at this time  Relevant Orders    Ambulatory referral to Weight Management      Other Visit Diagnoses     Encounter for immunization    -  Primary    Annual physical exam          I have recommended that this patient have a flu shot but he declines at this time  I have discussed the risks and benefits of this examination with him  The patient verbalizes understanding    Return in about 4 weeks (around 2/22/2021) for Annual physical and BP check       I have spent 25 minutes with Patient  today in which greater than 50% of this time was spent in counseling/coordination of care regarding Diagnostic results, Prognosis, Risks and benefits of tx options, Intructions for management, Patient and family education, Importance of tx compliance, Risk factor reductions and Impressions  Subjective:     HPI: Pieter Mercado is a 47 y o  male who  has a past medical history of Hypertension  who presented to the office today for a follow-up of hypertension  Patient is compliant with his hypertension medications  Patient denies chest, cough, shortness of breath, headache, lightheadedness, or syncope  Patient states his ankle pain is stable and does not report lower extremity edema  Patient does not take blood pressures at home  Patient states his asthma is stable, only at times requiring his rescue inhaler, but this is seldom  He has sleep apnea and is compliant with his CPAP machine  He is concerned about his weight and is enquiring about nonsurgical weight management options  He reports no pain, new medical conditions, injuries, or new illnesses at this time          The following portions of the patient's history were reviewed and updated as appropriate: allergies, current medications, past family history, past medical history, past social history, past surgical history and problem list     Current Outpatient Medications on File Prior to Visit   Medication Sig Dispense Refill    albuterol (PROVENTIL HFA,VENTOLIN HFA) 90 mcg/act inhaler INHALE 2 PUFFS BY MOUTH EVERY 6 HOURS AS NEEDED FOR WHEEZING 8 5 g 0    amLODIPine (NORVASC) 10 mg tablet Take 1 tablet (10 mg total) by mouth daily 30 tablet 0    fluticasone (FLONASE) 50 mcg/act nasal spray 2 sprays into each nostril daily 1 Bottle 11    montelukast (SINGULAIR) 10 mg tablet Take 1 tablet (10 mg total) by mouth daily at bedtime 30 tablet 0    naproxen (NAPROSYN) 500 mg tablet Take 1 tablet (500 mg total) by mouth 2 (two) times a day as needed for moderate pain 60 tablet 2    [DISCONTINUED] hydrochlorothiazide (HYDRODIURIL) 25 mg tablet Take 1 tablet (25 mg total) by mouth daily 30 tablet 0     No current facility-administered medications on file prior to visit  Review of Systems   Constitutional: Negative for activity change, chills, fatigue, fever and unexpected weight change  HENT: Negative for congestion, ear pain, hearing loss, rhinorrhea, sinus pressure and sore throat  Eyes: Negative for pain and visual disturbance  Respiratory: Negative for cough, shortness of breath and wheezing  Cardiovascular: Negative for chest pain, palpitations and leg swelling  Gastrointestinal: Negative for abdominal pain, nausea and vomiting  Genitourinary: Negative for dysuria and hematuria  Musculoskeletal: Negative for arthralgias, back pain, gait problem, joint swelling and myalgias  Skin: Negative for color change and rash  Neurological: Negative for dizziness, tremors, seizures, syncope, weakness, light-headedness and headaches  Psychiatric/Behavioral: Negative for agitation, behavioral problems, confusion, dysphoric mood, self-injury, sleep disturbance and suicidal ideas  The patient is not nervous/anxious  All other systems reviewed and are negative  BMI Counseling: Body mass index is 50 43 kg/m²  The BMI is above normal  Nutrition recommendations include decreasing portion sizes, encouraging healthy choices of fruits and vegetables, decreasing fast food intake, consuming healthier snacks, limiting drinks that contain sugar, moderation in carbohydrate intake, increasing intake of lean protein, reducing intake of saturated and trans fat and reducing intake of cholesterol  Exercise recommendations include moderate physical activity 150 minutes/week, exercising 3-5 times per week and obtaining a gym membership  No pharmacotherapy was ordered  Patient referred to weight management due to patient being overweight  Discussion in office    Referral to [facility] program           Objective:    /92 (BP Location: Left arm, Patient Position: Sitting, Cuff Size: Large)   Pulse 105   Temp (!) 97 4 °F (36 3 °C) (Temporal)   Resp 18   Ht 5' 1" (1 549 m)   Wt 121 kg (266 lb 14 4 oz)   SpO2 99%   BMI 50 43 kg/m²     Physical Exam  Constitutional:       Appearance: Normal appearance  He is obese  HENT:      Head: Normocephalic  Right Ear: Tympanic membrane, ear canal and external ear normal  There is no impacted cerumen  Left Ear: Tympanic membrane, ear canal and external ear normal  There is no impacted cerumen  Nose: Nose normal       Mouth/Throat:      Mouth: Mucous membranes are moist    Eyes:      Extraocular Movements: Extraocular movements intact  Conjunctiva/sclera: Conjunctivae normal       Pupils: Pupils are equal, round, and reactive to light  Neck:      Musculoskeletal: Normal range of motion  Cardiovascular:      Rate and Rhythm: Normal rate and regular rhythm  Pulses: Normal pulses  Heart sounds: Normal heart sounds  Pulmonary:      Effort: Pulmonary effort is normal       Breath sounds: Normal breath sounds  Abdominal:      General: Bowel sounds are normal       Palpations: Abdomen is soft  Tenderness: There is no abdominal tenderness  Musculoskeletal:         General: No signs of injury  Right ankle: He exhibits decreased range of motion  Tenderness  Left ankle: He exhibits decreased range of motion  Tenderness  Right lower leg: No edema  Left lower leg: No edema  Skin:     General: Skin is warm and dry  Capillary Refill: Capillary refill takes less than 2 seconds  Findings: No bruising, lesion or rash  Neurological:      General: No focal deficit present  Mental Status: He is alert and oriented to person, place, and time  Gait: Gait abnormal    Psychiatric:         Mood and Affect: Mood normal          Behavior: Behavior normal          Thought Content:  Thought content normal          KATARINA Pepper  01/25/21  2:02 PM    Patient Instructions     Weight Management   WHAT YOU NEED TO KNOW:   Being overweight increases your risk of health conditions such as heart disease, high blood pressure, type 2 diabetes, and certain types of cancer  It can also increase your risk for osteoarthritis, sleep apnea, and other respiratory problems  Aim for a slow, steady weight loss  Even a small amount of weight loss can lower your risk of health problems  DISCHARGE INSTRUCTIONS:   How to lose weight safely:  A safe and healthy way to lose weight is to eat fewer calories and get regular exercise  · You can lose up about 1 pound a week by decreasing the number of calories you eat by 500 calories each day  You can decrease calories by eating smaller portion sizes or by cutting out high-calorie foods  Read labels to find out how many calories are in the foods you eat  · You can also burn calories with exercise such as walking, swimming, or biking  You will be more likely to keep weight off if you make these changes part of your lifestyle  Exercise at least 30 minutes per day on most days of the week  You can also fit in more physical activity by taking the stairs instead of the elevator or parking farther away from stores  Ask your healthcare provider about the best exercise plan for you  Healthy meal plan for weight management:  A healthy meal plan includes a variety of foods, contains fewer calories, and helps you stay healthy  A healthy meal plan includes the following:     · Eat whole-grain foods more often  A healthy meal plan should contain fiber  Fiber is the part of grains, fruits, and vegetables that is not broken down by your body  Whole-grain foods are healthy and provide extra fiber in your diet  Some examples of whole-grain foods are whole-wheat breads and pastas, oatmeal, brown rice, and bulgur  · Eat a variety of vegetables every day  Include dark, leafy greens such as spinach, kale, kimani greens, and mustard greens   Eat yellow and orange vegetables such as carrots, sweet potatoes, and winter squash  · Eat a variety of fruits every day  Choose fresh or canned fruit (canned in its own juice or light syrup) instead of juice  Fruit juice has very little or no fiber  · Eat low-fat dairy foods  Drink fat-free (skim) milk or 1% milk  Eat fat-free yogurt and low-fat cottage cheese  Try low-fat cheeses such as mozzarella and other reduced-fat cheeses  · Choose meat and other protein foods that are low in fat  Choose beans or other legumes such as split peas or lentils  Choose fish, skinless poultry (chicken or turkey), or lean cuts of red meat (beef or pork)  Before you cook meat or poultry, cut off any visible fat  · Use less fat and oil  Try baking foods instead of frying them  Add less fat, such as margarine, sour cream, regular salad dressing, and mayonnaise to foods  Eat fewer high-fat foods  Some examples of high-fat foods include french fries, doughnuts, ice cream, and cakes  · Eat fewer sweets  Limit foods and drinks that are high in sugar  This includes candy, cookies, regular soda, and sweetened drinks  Ways to decrease calories:   · Eat smaller portions  ? Use a small plate with smaller servings  ? Do not eat second helpings  ? When you eat at a restaurant, ask for a box and place half of your meal in the box before you eat  ? Share an entrée with someone else  · Replace high-calorie snacks with healthy, low-calorie snacks  ? Choose fresh fruit, vegetables, fat-free rice cakes, or air-popped popcorn instead of potato chips, nuts, or chocolate  ? Choose water or calorie-free drinks instead of soda or sweetened drinks  · Do not shop for groceries when you are hungry  You may be more likely to make unhealthy food choices  Take a grocery list of healthy foods and shop after you have eaten  · Eat regular meals  Do not skip meals  Skipping meals can lead to overeating later in the day  This can make it harder for you to lose weight   Eat a healthy snack in place of a meal if you do not have time to eat a regular meal  Talk with a dietitian to help you create a meal plan and schedule that is right for you  Other things to consider as you try to lose weight:   · Be aware of situations that may give you the urge to overeat, such as eating while watching television  Find ways to avoid these situations  For example, read a book, go for a walk, or do crafts  · Meet with a weight loss support group or friends who are also trying to lose weight  This may help you stay motivated to continue working on your weight loss goals  © Copyright [address] Information is for End User's use only and may not be sold, redistributed or otherwise used for commercial purposes  All illustrations and images included in CareNotes® are the copyrighted property of [employer]  or [facility] Evelyn Schulz   The above information is an  only  It is not intended as medical advice for individual conditions or treatments  Talk to your doctor, nurse or pharmacist before following any medical regimen to see if it is safe and effective for you  Wellness Visit for Adults   AMBULATORY CARE:   A wellness visit  is when you see your healthcare provider to get screened for health problems  Your healthcare provider will also give you advice on how to stay healthy  Write down your questions so you remember to ask them  Ask your healthcare provider how often you should have a wellness visit  What happens at a wellness visit:  Your healthcare provider will ask about your health, and your family history of health problems  This includes high blood pressure, heart disease, and cancer  He or she will ask if you have symptoms that concern you, if you smoke, and about your mood  You may also be asked about your intake of medicines, supplements, food, and alcohol  Any of the following may be done:  · Your weight  will be checked   Your height may also be checked so your body mass index (BMI) can be calculated  Your BMI shows if you are at a healthy weight  · Your blood pressure  and heart rate will be checked  Your temperature may also be checked  · Blood and urine tests  may be done  Blood tests may be done to check your cholesterol levels  Abnormal cholesterol levels increase your risk for heart disease and stroke  You may also need a blood or urine test to check for diabetes if you are at increased risk  Urine tests may be done to look for signs of an infection or kidney disease  · A physical exam  includes checking your heartbeat and lungs with a stethoscope  Your healthcare provider may also check your skin to look for sun damage  · Screening tests  may be recommended  A screening test is done to check for diseases that may not cause symptoms  The screening tests you may need depend on your age, gender, family history, and lifestyle habits  For example, colorectal screening may be recommended if you are 48years old or older  Screening tests you need if you are a woman:   · A Pap smear  is used to screen for cervical cancer  Pap smears are usually done every 3 to 5 years depending on your age  You may need them more often if you have had abnormal Pap smear test results in the past  Ask your healthcare provider how often you should have a Pap smear  · A mammogram  is an x-ray of your breasts to screen for breast cancer  Experts recommend mammograms every 2 years starting at age 48 years  You may need a mammogram at age 52 years or younger if you have an increased risk for breast cancer  Talk to your healthcare provider about when you should start having mammograms and how often you need them  Vaccines you may need:   · Get an influenza vaccine  every year  The influenza vaccine protects you from the flu  Several types of viruses cause the flu  The viruses change over time, so new vaccines are made each year      · Get a tetanus-diphtheria (Td) booster vaccine  every 10 years  This vaccine protects you against tetanus and diphtheria  Tetanus is a severe infection that may cause painful muscle spasms and lockjaw  Diphtheria is a severe bacterial infection that causes a thick covering in the back of your mouth and throat  · Get a human papillomavirus (HPV) vaccine  if you are female and aged 23 to 32 or male 23 to 24 and never received it  This vaccine protects you from HPV infection  HPV is the most common infection spread by sexual contact  HPV may also cause vaginal, penile, and anal cancers  · Get a pneumococcal vaccine  if you are aged 72 years or older  The pneumococcal vaccine is an injection given to protect you from pneumococcal disease  Pneumococcal disease is an infection caused by pneumococcal bacteria  The infection may cause pneumonia, meningitis, or an ear infection  · Get a shingles vaccine  if you are 60 or older, even if you have had shingles before  The shingles vaccine is an injection to protect you from the varicella-zoster virus  This is the same virus that causes chickenpox  Shingles is a painful rash that develops in people who had chickenpox or have been exposed to the virus  How to eat healthy:  My Plate is a model for planning healthy meals  It shows the types and amounts of foods that should go on your plate  Fruits and vegetables make up about half of your plate, and grains and protein make up the other half  A serving of dairy is included on the side of your plate  The amount of calories and serving sizes you need depends on your age, gender, weight, and height  Examples of healthy foods are listed below:  · Eat a variety of vegetables  such as dark green, red, and orange vegetables  You can also include canned vegetables low in sodium (salt) and frozen vegetables without added butter or sauces  · Eat a variety of fresh fruits , canned fruit in 100% juice, frozen fruit, and dried fruit  · Include whole grains    At least half of the grains you eat should be whole grains  Examples include whole-wheat bread, wheat pasta, brown rice, and whole-grain cereals such as oatmeal     · Eat a variety of protein foods such as seafood (fish and shellfish), lean meat, and poultry without skin (turkey and chicken)  Examples of lean meats include pork leg, shoulder, or tenderloin, and beef round, sirloin, tenderloin, and extra lean ground beef  Other protein foods include eggs and egg substitutes, beans, peas, soy products, nuts, and seeds  · Choose low-fat dairy products such as skim or 1% milk or low-fat yogurt, cheese, and cottage cheese  · Limit unhealthy fats  such as butter, hard margarine, and shortening  Exercise:  Exercise at least 30 minutes per day on most days of the week  Some examples of exercise include walking, biking, dancing, and swimming  You can also fit in more physical activity by taking the stairs instead of the elevator or parking farther away from stores  Include muscle strengthening activities 2 days each week  Regular exercise provides many health benefits  It helps you manage your weight, and decreases your risk for type 2 diabetes, heart disease, stroke, and high blood pressure  Exercise can also help improve your mood  Ask your healthcare provider about the best exercise plan for you  General health and safety guidelines:   · Do not smoke  Nicotine and other chemicals in cigarettes and cigars can cause lung damage  Ask your healthcare provider for information if you currently smoke and need help to quit  E-cigarettes or smokeless tobacco still contain nicotine  Talk to your healthcare provider before you use these products  · Limit alcohol  A drink of alcohol is 12 ounces of beer, 5 ounces of wine, or 1½ ounces of liquor  · Lose weight, if needed  Being overweight increases your risk of certain health conditions   These include heart disease, high blood pressure, type 2 diabetes, and certain types of cancer  · Protect your skin  Do not sunbathe or use tanning beds  Use sunscreen with a SPF 15 or higher  Apply sunscreen at least 15 minutes before you go outside  Reapply sunscreen every 2 hours  Wear protective clothing, hats, and sunglasses when you are outside  · Drive safely  Always wear your seatbelt  Make sure everyone in your car wears a seatbelt  A seatbelt can save your life if you are in an accident  Do not use your cell phone when you are driving  This could distract you and cause an accident  Pull over if you need to make a call or send a text message  · Practice safe sex  Use latex condoms if are sexually active and have more than one partner  Your healthcare provider may recommend screening tests for sexually transmitted infections (STIs)  · Wear helmets, lifejackets, and protective gear  Always wear a helmet when you ride a bike or motorcycle, go skiing, or play sports that could cause a head injury  Wear protective equipment when you play sports  Wear a lifejacket when you are on a boat or doing water sports  © Copyright [address] Information is for End User's use only and may not be sold, redistributed or otherwise used for commercial purposes  All illustrations and images included in CareNotes® are the copyrighted property of [employer]  or [facility] Evelyn Schulz   The above information is an  only  It is not intended as medical advice for individual conditions or treatments  Talk to your doctor, nurse or pharmacist before following any medical regimen to see if it is safe and effective for you

## 2021-02-12 DIAGNOSIS — I10 ESSENTIAL HYPERTENSION: ICD-10-CM

## 2021-02-12 RX ORDER — HYDROCHLOROTHIAZIDE 25 MG/1
TABLET ORAL
Qty: 30 TABLET | Refills: 0 | Status: SHIPPED | OUTPATIENT
Start: 2021-02-12 | End: 2021-02-16

## 2021-02-15 DIAGNOSIS — I10 ESSENTIAL HYPERTENSION: ICD-10-CM

## 2021-02-15 DIAGNOSIS — J30.9 ALLERGIC RHINITIS, UNSPECIFIED SEASONALITY, UNSPECIFIED TRIGGER: ICD-10-CM

## 2021-02-16 RX ORDER — AMLODIPINE BESYLATE 10 MG/1
TABLET ORAL
Qty: 30 TABLET | Refills: 0 | Status: SHIPPED | OUTPATIENT
Start: 2021-02-16 | End: 2021-03-17

## 2021-02-16 RX ORDER — MONTELUKAST SODIUM 10 MG/1
TABLET ORAL
Qty: 30 TABLET | Refills: 0 | Status: SHIPPED | OUTPATIENT
Start: 2021-02-16 | End: 2021-03-17

## 2021-02-16 RX ORDER — HYDROCHLOROTHIAZIDE 25 MG/1
TABLET ORAL
Qty: 30 TABLET | Refills: 0 | Status: SHIPPED | OUTPATIENT
Start: 2021-02-16 | End: 2021-03-02

## 2021-02-22 ENCOUNTER — OFFICE VISIT (OUTPATIENT)
Dept: FAMILY MEDICINE CLINIC | Facility: CLINIC | Age: 41
End: 2021-02-22

## 2021-02-22 VITALS
SYSTOLIC BLOOD PRESSURE: 134 MMHG | OXYGEN SATURATION: 98 % | TEMPERATURE: 97.6 F | HEART RATE: 114 BPM | HEIGHT: 61 IN | WEIGHT: 265 LBS | RESPIRATION RATE: 18 BRPM | BODY MASS INDEX: 50.03 KG/M2 | DIASTOLIC BLOOD PRESSURE: 90 MMHG

## 2021-02-22 DIAGNOSIS — Z00.00 ANNUAL PHYSICAL EXAM: ICD-10-CM

## 2021-02-22 DIAGNOSIS — Z23 ENCOUNTER FOR IMMUNIZATION: ICD-10-CM

## 2021-02-22 DIAGNOSIS — Z00.00 HEALTHCARE MAINTENANCE: ICD-10-CM

## 2021-02-22 DIAGNOSIS — I10 ESSENTIAL HYPERTENSION: ICD-10-CM

## 2021-02-22 DIAGNOSIS — M25.572 CHRONIC PAIN OF BOTH ANKLES: ICD-10-CM

## 2021-02-22 DIAGNOSIS — E66.01 MORBID OBESITY WITH BMI OF 50.0-59.9, ADULT (HCC): Primary | ICD-10-CM

## 2021-02-22 DIAGNOSIS — J45.20 MILD INTERMITTENT ASTHMA WITHOUT COMPLICATION: ICD-10-CM

## 2021-02-22 DIAGNOSIS — G89.29 CHRONIC PAIN OF BOTH ANKLES: ICD-10-CM

## 2021-02-22 DIAGNOSIS — G47.30 SLEEP APNEA, UNSPECIFIED TYPE: ICD-10-CM

## 2021-02-22 DIAGNOSIS — M25.571 CHRONIC PAIN OF BOTH ANKLES: ICD-10-CM

## 2021-02-22 PROCEDURE — 99395 PREV VISIT EST AGE 18-39: CPT | Performed by: NURSE PRACTITIONER

## 2021-02-22 PROCEDURE — 1036F TOBACCO NON-USER: CPT | Performed by: NURSE PRACTITIONER

## 2021-02-22 NOTE — PROGRESS NOTES
[address] [facility] [place]    NAME: Talia Hatfield  AGE: 47 y o  SEX: male  : 1980     DATE: 2021     Assessment and Plan:     Problem List Items Addressed This Visit        Respiratory    Sleep apnea     Followed w/sleep medicine  Found severe apnea, currently on BiPAP and compliant  Endorses improvement in sleep  Mild intermittent asthma without complication     1-2 exacerbations in last month  Cont w/rescue inhaler  Lose weight  Cardiovascular and Mediastinum    Essential hypertension     Controlled  Cont current regimen, lose weight  Other    Morbid obesity with BMI of 50 0-59 9, adult ([facility] [address] ) - Primary     Anticipitory Guidance: Discussed diet - eating whole grains, lean meat, avoiding fast food, high-fat meats, and sugary beverages  A poor diet may lead to high cholesterol or prediabetes  We discussed strive-for-five fruits and vegetables a day  Increase exercise  Patient acknowledges understanding   -See BMI plan below   -appt w/bariatrics 3/1/21         Chronic pain of both ankles     Pulses and sensation intact, no edema or erythema  Continue wt loss, declines PT or ortho referral (tried in past)  Other Visit Diagnoses     Annual physical exam        Encounter for immunization        Healthcare maintenance        Relevant Orders    CBC and differential    Basic metabolic panel    Hemoglobin A1C    Lipid panel    TSH, 3rd generation with Free T4 reflex          Immunizations and preventive care screenings were discussed with patient today  Appropriate education was printed on patient's after visit summary  Counseling:  Alcohol/drug use: discussed moderation in alcohol intake, the recommendations for healthy alcohol use, and avoidance of illicit drug use  Dental Health: discussed importance of regular tooth brushing, flossing, and dental visits    Injury prevention: discussed safety/seat belts, safety helmets, smoke detectors, carbon dioxide detectors, and smoking near bedding or upholstery  Sexual health: discussed sexually transmitted diseases, partner selection, use of condoms, avoidance of unintended pregnancy, and contraceptive alternatives  · Exercise: the importance of regular exercise/physical activity was discussed  Recommend exercise 3-5 times per week for at least 30 minutes  BMI Counseling: Body mass index is 50 07 kg/m²  The BMI is above normal  Nutrition recommendations include decreasing portion sizes, encouraging healthy choices of fruits and vegetables, decreasing fast food intake, consuming healthier snacks, limiting drinks that contain sugar, moderation in carbohydrate intake, increasing intake of lean protein, reducing intake of saturated and trans fat and reducing intake of cholesterol  Exercise recommendations include moderate physical activity 150 minutes/week, exercising 3-5 times per week and obtaining a gym membership  Patient referred to weight management due to patient being overweight  Return in about 3 months (around 5/22/2021)  Chief Complaint:     Chief Complaint   Patient presents with    Physical Exam      History of Present Illness:     Adult Annual Physical   Patient here for a comprehensive physical exam  The patient reports no problems  Patient is following up from visit 1 month ago  At that time his blood pressure medication was adjusted  Today he is controlled  He denies shortness of breath, syncope, chest pain, or peripheral edema  He was able to follow-up with sleep medicine  He was found to have severe sleep apnea  He is now on BiPAP and is compliant with his machine  He does now in dorsal refreshing sleep, is less tired during the day  Bilateral ankle and foot pain remains his only complaint  He states on a past he did see Ortho, had imaging, went to PT  All of this failed to help    We discussed that his BMI is likely the principal causative factor  He is scheduled for 1st visit with weight management on March 1st   He remains committed to lifestyle changes to diet exercise  He sees his dentist regularly and has an eye doctor's appointment after this visit today  He is due for blood work and acknowledges that he will get this done at the earliest opportunity  He declines the pneumonia or flu vaccine at this time  He has no new illnesses or health concerns at this time  Diet and Physical Activity  · Diet/Nutrition: well balanced diet  · Exercise: no formal exercise  Depression Screening  PHQ-9 Depression Screening    PHQ-9:   Frequency of the following problems over the past two weeks:           General Health  · Sleep: on BiPAP  · Hearing: normal - bilateral   · Vision: wears glasses  · Dental: regular dental visits  [facility]  · History of STDs?: yes  Review of Systems:     Review of Systems   Constitutional: Negative for activity change, chills, fatigue, fever and unexpected weight change  HENT: Negative for congestion, ear pain, hearing loss, rhinorrhea, sinus pressure and sore throat  Eyes: Negative for pain and visual disturbance  Respiratory: Negative for cough, shortness of breath and wheezing  Cardiovascular: Negative for chest pain, palpitations and leg swelling  Gastrointestinal: Negative for abdominal pain, nausea and vomiting  Genitourinary: Negative for dysuria and hematuria  Musculoskeletal: Positive for arthralgias  Negative for back pain, gait problem, joint swelling and myalgias  Skin: Negative for color change and rash  Neurological: Negative for dizziness, tremors, seizures, syncope, weakness, light-headedness and headaches  Psychiatric/Behavioral: Negative for agitation, behavioral problems, confusion, dysphoric mood, self-injury, sleep disturbance and suicidal ideas  The patient is not nervous/anxious      All other systems reviewed and are negative  Past Medical History:     Past Medical History:   Diagnosis Date    Hypertension       Past Surgical History:     History reviewed  No pertinent surgical history  Social History:        Social History     Socioeconomic History    Marital status: /Civil Union     Spouse name: None    Number of children: None    Years of education: None    Highest education level: None   Occupational History    None   Social Needs    Financial resource strain: None    Food insecurity     Worry: None     Inability: None    Transportation needs     Medical: None     Non-medical: None   Tobacco Use    Smoking status: Never Smoker    Smokeless tobacco: Never Used   Substance and Sexual Activity    Alcohol use: No    Drug use: No    Sexual activity: None   Lifestyle    Physical activity     Days per week: None     Minutes per session: None    Stress: None   Relationships    Social connections     Talks on phone: None     Gets together: None     Attends Yarsanism service: None     Active member of club or organization: None     Attends meetings of clubs or organizations: None     Relationship status: None    Intimate partner violence     Fear of current or ex partner: None     Emotionally abused: None     Physically abused: None     Forced sexual activity: None   Other Topics Concern    None   Social History Narrative    None      Family History:     History reviewed  No pertinent family history     Current Medications:     Current Outpatient Medications   Medication Sig Dispense Refill    albuterol (PROVENTIL HFA,VENTOLIN HFA) 90 mcg/act inhaler INHALE 2 PUFFS BY MOUTH EVERY 6 HOURS AS NEEDED FOR WHEEZING 8 5 g 0    amLODIPine (NORVASC) 10 mg tablet TAKE 1 TABLET(10 MG) BY MOUTH DAILY 30 tablet 0    fluticasone (FLONASE) 50 mcg/act nasal spray 2 sprays into each nostril daily 1 Bottle 11    hydrochlorothiazide (HYDRODIURIL) 25 mg tablet TAKE 1 TABLET(25 MG) BY MOUTH DAILY 30 tablet 0    montelukast (SINGULAIR) 10 mg tablet TAKE 1 TABLET(10 MG) BY MOUTH DAILY AT BEDTIME 30 tablet 0    naproxen (NAPROSYN) 500 mg tablet Take 1 tablet (500 mg total) by mouth 2 (two) times a day as needed for moderate pain 60 tablet 2     No current facility-administered medications for this visit  Allergies:     No Known Allergies   Physical Exam:     /90 (BP Location: Left arm, Patient Position: Sitting)   Pulse (!) 114   Temp 97 6 °F (36 4 °C) (Temporal)   Resp 18   Ht 5' 1" (1 549 m)   Wt 120 kg (265 lb)   SpO2 98%   BMI 50 07 kg/m²     Physical Exam  Vitals signs and nursing note reviewed  Constitutional:       Appearance: Normal appearance  He is well-developed  He is obese  HENT:      Head: Normocephalic and atraumatic  Right Ear: Tympanic membrane, ear canal and external ear normal  There is no impacted cerumen  Left Ear: Tympanic membrane, ear canal and external ear normal  There is no impacted cerumen  Nose: Nose normal       Mouth/Throat:      Mouth: Mucous membranes are moist    Eyes:      Extraocular Movements: Extraocular movements intact  Conjunctiva/sclera: Conjunctivae normal       Pupils: Pupils are equal, round, and reactive to light  Neck:      Musculoskeletal: Normal range of motion and neck supple  Cardiovascular:      Rate and Rhythm: Normal rate and regular rhythm  Pulses: Normal pulses  Heart sounds: Normal heart sounds  No murmur  Pulmonary:      Effort: Pulmonary effort is normal  No respiratory distress  Breath sounds: Normal breath sounds  Abdominal:      Palpations: Abdomen is soft  Tenderness: There is no abdominal tenderness  Musculoskeletal: Normal range of motion  Right lower leg: No edema  Left lower leg: No edema  Right foot: No swelling  Left foot: No swelling  Skin:     General: Skin is warm and dry  Capillary Refill: Capillary refill takes less than 2 seconds     Neurological: General: No focal deficit present  Mental Status: He is alert and oriented to person, place, and time     Psychiatric:         Mood and Affect: Mood normal          Behavior: Behavior normal           Gregorio Fletcher 36

## 2021-02-22 NOTE — ASSESSMENT & PLAN NOTE
Pulses and sensation intact, no edema or erythema  Continue wt loss, declines PT or ortho referral (tried in past)

## 2021-02-22 NOTE — ASSESSMENT & PLAN NOTE
Anticipitory Guidance: Discussed diet - eating whole grains, lean meat, avoiding fast food, high-fat meats, and sugary beverages  A poor diet may lead to high cholesterol or prediabetes  We discussed strive-for-five fruits and vegetables a day  Increase exercise    Patient acknowledges understanding   -See BMI plan below   -appt w/bariatrics 3/1/21

## 2021-02-22 NOTE — PATIENT INSTRUCTIONS
Wellness Visit for Adults   AMBULATORY CARE:   A wellness visit  is when you see your healthcare provider to get screened for health problems  Your healthcare provider will also give you advice on how to stay healthy  Write down your questions so you remember to ask them  Ask your healthcare provider how often you should have a wellness visit  What happens at a wellness visit:  Your healthcare provider will ask about your health, and your family history of health problems  This includes high blood pressure, heart disease, and cancer  He or she will ask if you have symptoms that concern you, if you smoke, and about your mood  You may also be asked about your intake of medicines, supplements, food, and alcohol  Any of the following may be done:  · Your weight  will be checked  Your height may also be checked so your body mass index (BMI) can be calculated  Your BMI shows if you are at a healthy weight  · Your blood pressure  and heart rate will be checked  Your temperature may also be checked  · Blood and urine tests  may be done  Blood tests may be done to check your cholesterol levels  Abnormal cholesterol levels increase your risk for heart disease and stroke  You may also need a blood or urine test to check for diabetes if you are at increased risk  Urine tests may be done to look for signs of an infection or kidney disease  · A physical exam  includes checking your heartbeat and lungs with a stethoscope  Your healthcare provider may also check your skin to look for sun damage  · Screening tests  may be recommended  A screening test is done to check for diseases that may not cause symptoms  The screening tests you may need depend on your age, gender, family history, and lifestyle habits  For example, colorectal screening may be recommended if you are 48years old or older  Screening tests you need if you are a woman:   · A Pap smear  is used to screen for cervical cancer   Pap smears are usually done every 3 to 5 years depending on your age  You may need them more often if you have had abnormal Pap smear test results in the past  Ask your healthcare provider how often you should have a Pap smear  · A mammogram  is an x-ray of your breasts to screen for breast cancer  Experts recommend mammograms every 2 years starting at age 48 years  You may need a mammogram at age 52 years or younger if you have an increased risk for breast cancer  Talk to your healthcare provider about when you should start having mammograms and how often you need them  Vaccines you may need:   · Get an influenza vaccine  every year  The influenza vaccine protects you from the flu  Several types of viruses cause the flu  The viruses change over time, so new vaccines are made each year  · Get a tetanus-diphtheria (Td) booster vaccine  every 10 years  This vaccine protects you against tetanus and diphtheria  Tetanus is a severe infection that may cause painful muscle spasms and lockjaw  Diphtheria is a severe bacterial infection that causes a thick covering in the back of your mouth and throat  · Get a human papillomavirus (HPV) vaccine  if you are female and aged 23 to 32 or male 23 to 24 and never received it  This vaccine protects you from HPV infection  HPV is the most common infection spread by sexual contact  HPV may also cause vaginal, penile, and anal cancers  · Get a pneumococcal vaccine  if you are aged 72 years or older  The pneumococcal vaccine is an injection given to protect you from pneumococcal disease  Pneumococcal disease is an infection caused by pneumococcal bacteria  The infection may cause pneumonia, meningitis, or an ear infection  · Get a shingles vaccine  if you are 60 or older, even if you have had shingles before  The shingles vaccine is an injection to protect you from the varicella-zoster virus  This is the same virus that causes chickenpox   Shingles is a painful rash that develops in people who had chickenpox or have been exposed to the virus  How to eat healthy:  My Plate is a model for planning healthy meals  It shows the types and amounts of foods that should go on your plate  Fruits and vegetables make up about half of your plate, and grains and protein make up the other half  A serving of dairy is included on the side of your plate  The amount of calories and serving sizes you need depends on your age, gender, weight, and height  Examples of healthy foods are listed below:  · Eat a variety of vegetables  such as dark green, red, and orange vegetables  You can also include canned vegetables low in sodium (salt) and frozen vegetables without added butter or sauces  · Eat a variety of fresh fruits , canned fruit in 100% juice, frozen fruit, and dried fruit  · Include whole grains  At least half of the grains you eat should be whole grains  Examples include whole-wheat bread, wheat pasta, brown rice, and whole-grain cereals such as oatmeal     · Eat a variety of protein foods such as seafood (fish and shellfish), lean meat, and poultry without skin (turkey and chicken)  Examples of lean meats include pork leg, shoulder, or tenderloin, and beef round, sirloin, tenderloin, and extra lean ground beef  Other protein foods include eggs and egg substitutes, beans, peas, soy products, nuts, and seeds  · Choose low-fat dairy products such as skim or 1% milk or low-fat yogurt, cheese, and cottage cheese  · Limit unhealthy fats  such as butter, hard margarine, and shortening  Exercise:  Exercise at least 30 minutes per day on most days of the week  Some examples of exercise include walking, biking, dancing, and swimming  You can also fit in more physical activity by taking the stairs instead of the elevator or parking farther away from stores  Include muscle strengthening activities 2 days each week  Regular exercise provides many health benefits   It helps you manage your weight, and decreases your risk for type 2 diabetes, heart disease, stroke, and high blood pressure  Exercise can also help improve your mood  Ask your healthcare provider about the best exercise plan for you  General health and safety guidelines:   · Do not smoke  Nicotine and other chemicals in cigarettes and cigars can cause lung damage  Ask your healthcare provider for information if you currently smoke and need help to quit  E-cigarettes or smokeless tobacco still contain nicotine  Talk to your healthcare provider before you use these products  · Limit alcohol  A drink of alcohol is 12 ounces of beer, 5 ounces of wine, or 1½ ounces of liquor  · Lose weight, if needed  Being overweight increases your risk of certain health conditions  These include heart disease, high blood pressure, type 2 diabetes, and certain types of cancer  · Protect your skin  Do not sunbathe or use tanning beds  Use sunscreen with a SPF 15 or higher  Apply sunscreen at least 15 minutes before you go outside  Reapply sunscreen every 2 hours  Wear protective clothing, hats, and sunglasses when you are outside  · Drive safely  Always wear your seatbelt  Make sure everyone in your car wears a seatbelt  A seatbelt can save your life if you are in an accident  Do not use your cell phone when you are driving  This could distract you and cause an accident  Pull over if you need to make a call or send a text message  · Practice safe sex  Use latex condoms if are sexually active and have more than one partner  Your healthcare provider may recommend screening tests for sexually transmitted infections (STIs)  · Wear helmets, lifejackets, and protective gear  Always wear a helmet when you ride a bike or motorcycle, go skiing, or play sports that could cause a head injury  Wear protective equipment when you play sports  Wear a lifejacket when you are on a boat or doing water sports      © Copyright [employer] 2020 Information is for End User's use only and may not be sold, redistributed or otherwise used for commercial purposes  All illustrations and images included in CareNotes® are the copyrighted property of [employer] D A M [employer]  or Manasa Arenas  The above information is an  only  It is not intended as medical advice for individual conditions or treatments  Talk to your doctor, nurse or pharmacist before following any medical regimen to see if it is safe and effective for you  Weight Management   AMBULATORY CARE:   Why it is important to manage your weight:  Being overweight increases your risk of health conditions such as heart disease, high blood pressure, type 2 diabetes, and certain types of cancer  It can also increase your risk for osteoarthritis, sleep apnea, and other respiratory problems  Aim for a slow, steady weight loss  Even a small amount of weight loss can lower your risk of health problems  How to lose weight safely:  A safe and healthy way to lose weight is to eat fewer calories and get regular exercise  · You can lose up about 1 pound a week by decreasing the number of calories you eat by 500 calories each day  You can decrease calories by eating smaller portion sizes or by cutting out high-calorie foods  Read labels to find out how many calories are in the foods you eat  · You can also burn calories with exercise such as walking, swimming, or biking  You will be more likely to keep weight off if you make these changes part of your lifestyle  Exercise at least 30 minutes per day on most days of the week  You can also fit in more physical activity by taking the stairs instead of the elevator or parking farther away from stores  Ask your healthcare provider about the best exercise plan for you  Healthy meal plan for weight management:  A healthy meal plan includes a variety of foods, contains fewer calories, and helps you stay healthy   A healthy meal plan includes the following:     · Eat whole-grain foods more often  A healthy meal plan should contain fiber  Fiber is the part of grains, fruits, and vegetables that is not broken down by your body  Whole-grain foods are healthy and provide extra fiber in your diet  Some examples of whole-grain foods are whole-wheat breads and pastas, oatmeal, brown rice, and bulgur  · Eat a variety of vegetables every day  Include dark, leafy greens such as spinach, kale, kimani greens, and mustard greens  Eat yellow and orange vegetables such as carrots, sweet potatoes, and winter squash  · Eat a variety of fruits every day  Choose fresh or canned fruit (canned in its own juice or light syrup) instead of juice  Fruit juice has very little or no fiber  · Eat low-fat dairy foods  Drink fat-free (skim) milk or 1% milk  Eat fat-free yogurt and low-fat cottage cheese  Try low-fat cheeses such as mozzarella and other reduced-fat cheeses  · Choose meat and other protein foods that are low in fat  Choose beans or other legumes such as split peas or lentils  Choose fish, skinless poultry (chicken or turkey), or lean cuts of red meat (beef or pork)  Before you cook meat or poultry, cut off any visible fat  · Use less fat and oil  Try baking foods instead of frying them  Add less fat, such as margarine, sour cream, regular salad dressing and mayonnaise to foods  Eat fewer high-fat foods  Some examples of high-fat foods include french fries, doughnuts, ice cream, and cakes  · Eat fewer sweets  Limit foods and drinks that are high in sugar  This includes candy, cookies, regular soda, and sweetened drinks  Ways to decrease calories:   · Eat smaller portions  ? Use a small plate with smaller servings  ? Do not eat second helpings  ? When you eat at a restaurant, ask for a box and place half of your meal in the box before you eat  ? Share an entrée with someone else  · Replace high-calorie snacks with healthy, low-calorie snacks  ? Choose fresh fruit, vegetables, fat-free rice cakes, or air-popped popcorn instead of potato chips, nuts, or chocolate  ? Choose water or calorie-free drinks instead of soda or sweetened drinks  · Do not shop for groceries when you are hungry  You may be more likely to make unhealthy food choices  Take a grocery list of healthy foods and shop after you have eaten  · Eat regular meals  Do not skip meals  Skipping meals can lead to overeating later in the day  This can make it harder for you to lose weight  Eat a healthy snack in place of a meal if you do not have time to eat a regular meal  Talk with a dietitian to help you create a meal plan and schedule that is right for you  Other things to consider as you try to lose weight:   · Be aware of situations that may give you the urge to overeat, such as eating while watching television  Find ways to avoid these situations  For example, read a book, go for a walk, or do crafts  · Meet with a weight loss support group or friends who are also trying to lose weight  This may help you stay motivated to continue working on your weight loss goals  © Copyright [address] Information is for End User's use only and may not be sold, redistributed or otherwise used for commercial purposes  All illustrations and images included in CareNotes® are the copyrighted property of [employer]  or [facility] Evelyn Schulz   The above information is an  only  It is not intended as medical advice for individual conditions or treatments  Talk to your doctor, nurse or pharmacist before following any medical regimen to see if it is safe and effective for you  Obesity   AMBULATORY CARE:   Obesity  is when your body mass index (BMI) is greater than 30  Your healthcare provider will use your height and weight to measure your BMI  The risks of obesity include  many health problems, such as injuries or physical disability   You may need tests to check for the following:  · Diabetes    · High blood pressure or high cholesterol    · Heart disease    · Gallbladder or liver disease    · Cancer of the colon, breast, prostate, liver, or kidney    · Sleep apnea    · Arthritis or gout    Seek care immediately if:   · You have a severe headache, confusion, or difficulty speaking  · You have weakness on one side of your body  · You have chest pain, sweating, or shortness of breath  Contact your healthcare provider if:   · You have symptoms of gallbladder or liver disease, such as pain in your upper abdomen  · You have knee or hip pain and discomfort while walking  · You have symptoms of diabetes, such as intense hunger and thirst, and frequent urination  · You have symptoms of sleep apnea, such as snoring or daytime sleepiness  · You have questions or concerns about your condition or care  Treatment for obesity  focuses on helping you lose weight to improve your health  Even a small decrease in BMI can reduce the risk for many health problems  Your healthcare provider will help you set a weight-loss goal   · Lifestyle changes  are the first step in treating obesity  These include making healthy food choices and getting regular physical activity  Your healthcare provider may suggest a weight-loss program that involves coaching, education, and therapy  · Medicine  may help you lose weight when it is used with a healthy diet and physical activity  · Surgery  can help you lose weight if you are very obese and have other health problems  There are several types of weight-loss surgery  Ask your healthcare provider for more information  Be successful losing weight:   · Set small, realistic goals  An example of a small goal is to walk for 20 minutes 5 days a week  Danielle goal is to lose 5% of your body weight  · Tell friends, family members, and coworkers about your goals  and ask for their support   Ask a friend to lose weight with you, or join a weight-loss support group  · Identify foods or triggers that may cause you to overeat , and find ways to avoid them  Remove tempting high-calorie foods from your home and workplace  Place a bowl of fresh fruit on your kitchen counter  If stress causes you to eat, then find other ways to cope with stress  · Keep a diary to track what you eat and drink  Also write down how many minutes of physical activity you do each day  Weigh yourself once a week and record it in your diary  Eating changes: You will need to eat 500 to 1,000 fewer calories each day than you currently eat to lose 1 to 2 pounds a week  The following changes will help you cut calories:  · Eat smaller portions  Use small plates, no larger than 9 inches in diameter  Fill your plate half full of fruits and vegetables  Measure your food using measuring cups until you know what a serving size looks like  · Eat 3 meals and 1 or 2 snacks each day  Plan your meals in advance  Kristen Ferris and eat at home most of the time  Eat slowly  Do not skip meals  Skipping meals can lead to overeating later in the day  This can make it harder for you to lose weight  Talk with a dietitian to help you make a meal plan and schedule that is right for you  · Eat fruits and vegetables at every meal   They are low in calories and high in fiber, which makes you feel full  Do not add butter, margarine, or cream sauce to vegetables  Use herbs to season steamed vegetables  · Eat less fat and fewer fried foods  Eat more baked or grilled chicken and fish  These protein sources are lower in calories and fat than red meat  Limit fast food  Dress your salads with olive oil and vinegar instead of bottled dressing  · Limit the amount of sugar you eat  Do not drink sugary beverages  Limit alcohol  Activity changes:  Physical activity is good for your body in many ways  It helps you burn calories and build strong muscles   It decreases stress and depression, and improves your mood  It can also help you sleep better  Talk to your healthcare provider before you begin an exercise program   · Exercise for at least 30 minutes 5 days a week  Start slowly  Set aside time each day for physical activity that you enjoy and that is convenient for you  It is best to do both weight training and an activity that increases your heart rate, such as walking, bicycling, or swimming  · Find ways to be more active  Do yard work and housecleaning  Walk up the stairs instead of using elevators  Spend your leisure time going to events that require walking, such as outdoor festivals or fairs  This extra physical activity can help you lose weight and keep it off  Follow up with your healthcare provider as directed: You may need to meet with a dietitian  Write down your questions so you remember to ask them during your visits  © Copyright [address] Information is for End User's use only and may not be sold, redistributed or otherwise used for commercial purposes  All illustrations and images included in CareNotes® are the copyrighted property of [employer]  or [facility] Evelyn Schulz   The above information is an  only  It is not intended as medical advice for individual conditions or treatments  Talk to your doctor, nurse or pharmacist before following any medical regimen to see if it is safe and effective for you

## 2021-02-22 NOTE — ASSESSMENT & PLAN NOTE
Followed w/sleep medicine  Found severe apnea, currently on BiPAP and compliant  Endorses improvement in sleep

## 2021-03-01 ENCOUNTER — CONSULT (OUTPATIENT)
Dept: BARIATRICS | Facility: CLINIC | Age: 41
End: 2021-03-01
Payer: COMMERCIAL

## 2021-03-01 VITALS
HEIGHT: 62 IN | WEIGHT: 261.1 LBS | SYSTOLIC BLOOD PRESSURE: 138 MMHG | BODY MASS INDEX: 48.05 KG/M2 | DIASTOLIC BLOOD PRESSURE: 90 MMHG | TEMPERATURE: 99.4 F | HEART RATE: 106 BPM

## 2021-03-01 DIAGNOSIS — E66.01 OBESITY, CLASS III, BMI 40-49.9 (MORBID OBESITY) (HCC): Primary | ICD-10-CM

## 2021-03-01 DIAGNOSIS — M25.572 CHRONIC PAIN OF BOTH ANKLES: ICD-10-CM

## 2021-03-01 DIAGNOSIS — G47.30 SLEEP APNEA, UNSPECIFIED TYPE: ICD-10-CM

## 2021-03-01 DIAGNOSIS — M25.571 CHRONIC PAIN OF BOTH ANKLES: ICD-10-CM

## 2021-03-01 DIAGNOSIS — I10 ESSENTIAL HYPERTENSION: ICD-10-CM

## 2021-03-01 DIAGNOSIS — R00.0 ELEVATED PULSE RATE: ICD-10-CM

## 2021-03-01 DIAGNOSIS — J45.20 MILD INTERMITTENT ASTHMA WITHOUT COMPLICATION: ICD-10-CM

## 2021-03-01 DIAGNOSIS — G89.29 CHRONIC PAIN OF BOTH ANKLES: ICD-10-CM

## 2021-03-01 DIAGNOSIS — G47.33 OSA (OBSTRUCTIVE SLEEP APNEA): ICD-10-CM

## 2021-03-01 PROBLEM — E66.813 OBESITY, CLASS III, BMI 40-49.9 (MORBID OBESITY): Status: ACTIVE | Noted: 2021-03-01

## 2021-03-01 PROCEDURE — 99244 OFF/OP CNSLTJ NEW/EST MOD 40: CPT | Performed by: PHYSICIAN ASSISTANT

## 2021-03-01 PROCEDURE — 3008F BODY MASS INDEX DOCD: CPT | Performed by: PHYSICIAN ASSISTANT

## 2021-03-01 PROCEDURE — 3008F BODY MASS INDEX DOCD: CPT | Performed by: NURSE PRACTITIONER

## 2021-03-01 PROCEDURE — 3075F SYST BP GE 130 - 139MM HG: CPT | Performed by: PHYSICIAN ASSISTANT

## 2021-03-01 PROCEDURE — 1036F TOBACCO NON-USER: CPT | Performed by: PHYSICIAN ASSISTANT

## 2021-03-01 PROCEDURE — 3080F DIAST BP >= 90 MM HG: CPT | Performed by: PHYSICIAN ASSISTANT

## 2021-03-01 RX ORDER — ASPIRIN 81 MG/1
81 TABLET ORAL DAILY
COMMUNITY
End: 2021-05-24 | Stop reason: SDUPTHER

## 2021-03-01 NOTE — ASSESSMENT & PLAN NOTE
-Discussed options of [facility]-Intensive Lifestyle Intervention Program, Very Low Calorie Diet-VLCD, Conservative Program, Mabel-En-Y Gastric Bypass and Vertical Sleeve Gastrectomy and the role of weight loss medications   -Initial weight loss goal of 5-10% weight loss for improved health  -Screening labs  Recommend checking lab coverage before having labs drawn  -NEEDS Lipids, tsh, a1c, fasting insulin and cmp  -Patient is interested in pursuing Conservative Program    Goals:  Food log (ie ) www myfitnesspal com,sparkpeople  com,loseit com,calorieking  com,etc  baritastic  No sugary beverages  At least 64oz of water daily -eliminate soda and make coffee at home   Increase physical activity by 10 minutes daily   Gradually increase physical activity to a goal of 5 days per week for 30 minutes of MODERATE intensity PLUS 2 days per week of FULL BODY resistance training-15 minutes of weight or 15 minutes daily   5259-3800 calories per day   Meal prep

## 2021-03-01 NOTE — PROGRESS NOTES
Assessment/Plan: Morbid obesity with BMI of 50 0-59 9, adult ([place] [address] )  -Discussed options of HealthyCORE-Intensive Lifestyle Intervention Program, Very Low Calorie Diet-VLCD, Conservative Program, Mabel-En-Y Gastric Bypass and Vertical Sleeve Gastrectomy and the role of weight loss medications   -Initial weight loss goal of 5-10% weight loss for improved health  -Screening labs  Recommend checking lab coverage before having labs drawn  -NEEDS Lipids, tsh, a1c, fasting insulin and cmp  - STOP BANG-  -Patient is interested in pursuing Conservative Program    VLCD Review:  Contraindications: yes  Labs ordered: yes  HTN meds addressed: yes  DM2 meds addressed: yes  VLCD time restriction based on BMI: no  LMP/OCP: yes    Essential hypertension  Taking hctz and norvasc  -should improve with weight loss, dietary, and lifestyle changes  -continue management with prescribing provider      EVE (obstructive sleep apnea)  -encouraged continued use of CPAP machine  -may improve with 20-30% weight loss      Mild intermittent asthma without complication  Taking albuterol  -continue management with prescribing provider      Obesity, Class III, BMI 40-49 9 (morbid obesity) ([place] [address] )  -Discussed options of Healthy[facility]-Intensive Lifestyle Intervention Program, Very Low Calorie Diet-VLCD, Conservative Program, Mabel-En-Y Gastric Bypass and Vertical Sleeve Gastrectomy and the role of weight loss medications   -Initial weight loss goal of 5-10% weight loss for improved health  -Screening labs  Recommend checking lab coverage before having labs drawn  -NEEDS Lipids, tsh, a1c, fasting insulin and cmp  -Patient is interested in pursuing Conservative Program    Goals:  Food log (ie ) www myfitnesspal com,sparkpeople  com,loseit com,calorieking  com,etc  baritastic  No sugary beverages  At least 64oz of water daily -eliminate soda and make coffee at home   Increase physical activity by 10 minutes daily   Gradually increase physical activity to a goal of 5 days per week for 30 minutes of MODERATE intensity PLUS 2 days per week of FULL BODY resistance training-15 minutes of weight or 15 minutes daily   [id number] calories per day   Meal prep         Elevated pulse rate  being monitored by pcp  Follow up in approximately 1 month with Non-Surgical Physician/Advanced Practitioner  Diagnoses and all orders for this visit:    Obesity, Class III, BMI 40-49 9 (morbid obesity) ([place] [address] )    Essential hypertension  -     Ambulatory referral to Weight Management  -     Insulin, fasting; Future    EVE (obstructive sleep apnea)  -     Insulin, fasting; Future    Mild intermittent asthma without complication  -     Ambulatory referral to Weight Management  -     Insulin, fasting; Future    Sleep apnea, unspecified type  -     Ambulatory referral to Weight Management  -     Insulin, fasting; Future    Chronic pain of both ankles  -     Ambulatory referral to Weight Management  -     Insulin, fasting; Future    Elevated pulse rate    Other orders  -     aspirin (ECOTRIN LOW STRENGTH) 81 mg EC tablet; Take 81 mg by mouth daily          Subjective:   Chief Complaint   Patient presents with    Consult     mwm consult       Patient ID: Marquis Alvarez  is a 47 y o  male with excess weight/obesity here to pursue weight management  Past Medical History:   Diagnosis Date    Hypertension     Morbid obesity with BMI of 50 0-59 9, adult ([place] [address] ) 12/16/2019       HPI:  Obesity/Excess Weight:  Severity: Severe  Onset: For the last 10 years     Modifiers: Diet and Exercise and Over the Counter Weight Loss Supplements  Contributing factors: Insufficient Physical Activity, improper eating   Associated symptoms: comorbid conditions and increased joint pain    Goals: 150-160 lbs   Hydration: 36-46 oz of water, 1 large DD iced coffee with cream and flavor   1 liter of soda per week    Alcohol: none     Colonoscopy-Not applicable    The following portions of the patient's history were reviewed and updated as appropriate: allergies, current medications, past family history, past medical history, past social history, past surgical history and problem list     Review of Systems   HENT: Negative for sore throat  Respiratory: Negative for cough and shortness of breath  Cardiovascular: Negative for chest pain and palpitations  Gastrointestinal: Negative for abdominal pain, constipation, diarrhea, nausea and vomiting         + GERD--diet controlled    Endocrine: Positive for heat intolerance  Negative for cold intolerance  Genitourinary: Negative for dysuria  Musculoskeletal: Positive for arthralgias (ankles)  Negative for back pain  Skin: Negative for rash  Neurological: Negative for headaches  Psychiatric/Behavioral: Negative for suicidal ideas (denies HI)  Denies Depression and anxiety       Objective:    /90 (BP Location: Left arm, Patient Position: Sitting, Cuff Size: Large)   Pulse (!) 106   Temp 99 4 °F (37 4 °C)   Ht 5' 1 5" (1 562 m)   Wt 118 kg (261 lb 1 6 oz)   BMI 48 54 kg/m²     Physical Exam  Vitals signs and nursing note reviewed  Constitutional   General appearance: Abnormal   well developed and morbidly obese  Eyes No conjunctival pallor  Ears, Nose, Mouth, and Throat Bilateral external ears-no discharge, edema, erythema   Pulmonary   Respiratory effort: No increased work of breathing or signs of respiratory distress  Auscultation of lungs: Clear to auscultation, equal breath sounds bilaterally, no wheezes, no rales, no rhonci  Cardiovascular   Auscultation of heart: Normal rate and rhythm, normal S1 and S2, without murmurs  Examination of extremities for edema and/or varicosities: Normal   no edema  Abdomen   Abdomen: Abnormal   The abdomen was obese  Bowel sounds were normal  The abdomen was soft and nontender     Musculoskeletal   Gait and station: Normal     Psychiatric   Orientation to person, place and time: Normal     Affect: appropriate

## 2021-03-01 NOTE — ASSESSMENT & PLAN NOTE
Taking hctz and norvasc  -should improve with weight loss, dietary, and lifestyle changes  -continue management with prescribing provider

## 2021-03-01 NOTE — ASSESSMENT & PLAN NOTE
-Discussed options of HealthyCORE-Intensive Lifestyle Intervention Program, Very Low Calorie Diet-VLCD, Conservative Program, Mabel-En-Y Gastric Bypass and Vertical Sleeve Gastrectomy and the role of weight loss medications   -Initial weight loss goal of 5-10% weight loss for improved health  -Screening labs  Recommend checking lab coverage before having labs drawn    -NEEDS Lipids, tsh, a1c, fasting insulin and cmp  - STOP BANG-  -Patient is interested in pursuing Conservative Program    VLCD Review:  Contraindications: yes  Labs ordered: yes  HTN meds addressed: yes  DM2 meds addressed: yes  VLCD time restriction based on BMI: no  LMP/OCP: yes

## 2021-03-02 DIAGNOSIS — I10 ESSENTIAL HYPERTENSION: ICD-10-CM

## 2021-03-02 RX ORDER — HYDROCHLOROTHIAZIDE 25 MG/1
TABLET ORAL
Qty: 30 TABLET | Refills: 0 | Status: SHIPPED | OUTPATIENT
Start: 2021-03-02 | End: 2021-03-15

## 2021-03-15 DIAGNOSIS — I10 ESSENTIAL HYPERTENSION: ICD-10-CM

## 2021-03-15 RX ORDER — HYDROCHLOROTHIAZIDE 25 MG/1
TABLET ORAL
Qty: 30 TABLET | Refills: 0 | Status: SHIPPED | OUTPATIENT
Start: 2021-03-15 | End: 2021-03-29

## 2021-03-17 DIAGNOSIS — I10 ESSENTIAL HYPERTENSION: ICD-10-CM

## 2021-03-17 DIAGNOSIS — J30.9 ALLERGIC RHINITIS, UNSPECIFIED SEASONALITY, UNSPECIFIED TRIGGER: ICD-10-CM

## 2021-03-17 RX ORDER — MONTELUKAST SODIUM 10 MG/1
TABLET ORAL
Qty: 30 TABLET | Refills: 0 | Status: SHIPPED | OUTPATIENT
Start: 2021-03-17 | End: 2021-04-21

## 2021-03-17 RX ORDER — AMLODIPINE BESYLATE 10 MG/1
TABLET ORAL
Qty: 30 TABLET | Refills: 0 | Status: SHIPPED | OUTPATIENT
Start: 2021-03-17 | End: 2021-04-21

## 2021-03-29 DIAGNOSIS — I10 ESSENTIAL HYPERTENSION: ICD-10-CM

## 2021-03-29 RX ORDER — HYDROCHLOROTHIAZIDE 25 MG/1
TABLET ORAL
Qty: 30 TABLET | Refills: 0 | Status: SHIPPED | OUTPATIENT
Start: 2021-03-29 | End: 2021-04-19

## 2021-04-19 DIAGNOSIS — I10 ESSENTIAL HYPERTENSION: ICD-10-CM

## 2021-04-19 RX ORDER — HYDROCHLOROTHIAZIDE 25 MG/1
TABLET ORAL
Qty: 30 TABLET | Refills: 0 | Status: SHIPPED | OUTPATIENT
Start: 2021-04-19 | End: 2021-05-03

## 2021-04-21 DIAGNOSIS — J30.9 ALLERGIC RHINITIS, UNSPECIFIED SEASONALITY, UNSPECIFIED TRIGGER: ICD-10-CM

## 2021-04-21 DIAGNOSIS — I10 ESSENTIAL HYPERTENSION: ICD-10-CM

## 2021-04-21 RX ORDER — AMLODIPINE BESYLATE 10 MG/1
TABLET ORAL
Qty: 30 TABLET | Refills: 0 | Status: SHIPPED | OUTPATIENT
Start: 2021-04-21 | End: 2021-05-24 | Stop reason: SDUPTHER

## 2021-04-21 RX ORDER — MONTELUKAST SODIUM 10 MG/1
TABLET ORAL
Qty: 30 TABLET | Refills: 0 | Status: SHIPPED | OUTPATIENT
Start: 2021-04-21 | End: 2021-05-24 | Stop reason: SDUPTHER

## 2021-05-03 DIAGNOSIS — I10 ESSENTIAL HYPERTENSION: ICD-10-CM

## 2021-05-03 RX ORDER — HYDROCHLOROTHIAZIDE 25 MG/1
TABLET ORAL
Qty: 30 TABLET | Refills: 0 | Status: SHIPPED | OUTPATIENT
Start: 2021-05-03 | End: 2021-05-21

## 2021-05-21 DIAGNOSIS — I10 ESSENTIAL HYPERTENSION: ICD-10-CM

## 2021-05-21 RX ORDER — HYDROCHLOROTHIAZIDE 25 MG/1
TABLET ORAL
Qty: 30 TABLET | Refills: 0 | Status: SHIPPED | OUTPATIENT
Start: 2021-05-21 | End: 2021-05-24 | Stop reason: SDUPTHER

## 2021-05-23 DIAGNOSIS — J30.9 ALLERGIC RHINITIS, UNSPECIFIED SEASONALITY, UNSPECIFIED TRIGGER: ICD-10-CM

## 2021-05-23 DIAGNOSIS — I10 ESSENTIAL HYPERTENSION: ICD-10-CM

## 2021-05-24 ENCOUNTER — OFFICE VISIT (OUTPATIENT)
Dept: FAMILY MEDICINE CLINIC | Facility: CLINIC | Age: 41
End: 2021-05-24

## 2021-05-24 VITALS
HEIGHT: 62 IN | WEIGHT: 245 LBS | TEMPERATURE: 98.2 F | HEART RATE: 104 BPM | RESPIRATION RATE: 18 BRPM | OXYGEN SATURATION: 98 % | BODY MASS INDEX: 45.08 KG/M2 | DIASTOLIC BLOOD PRESSURE: 88 MMHG | SYSTOLIC BLOOD PRESSURE: 136 MMHG

## 2021-05-24 DIAGNOSIS — M25.571 CHRONIC PAIN OF BOTH ANKLES: ICD-10-CM

## 2021-05-24 DIAGNOSIS — I10 ESSENTIAL HYPERTENSION: ICD-10-CM

## 2021-05-24 DIAGNOSIS — J30.9 ALLERGIC RHINITIS, UNSPECIFIED SEASONALITY, UNSPECIFIED TRIGGER: ICD-10-CM

## 2021-05-24 DIAGNOSIS — M25.572 CHRONIC PAIN OF BOTH ANKLES: ICD-10-CM

## 2021-05-24 DIAGNOSIS — G89.29 CHRONIC PAIN OF BOTH ANKLES: ICD-10-CM

## 2021-05-24 DIAGNOSIS — R06.2 WHEEZING: ICD-10-CM

## 2021-05-24 DIAGNOSIS — S63.512A SPRAIN OF CARPAL JOINT OF LEFT WRIST, INITIAL ENCOUNTER: ICD-10-CM

## 2021-05-24 DIAGNOSIS — E66.01 OBESITY, CLASS III, BMI 40-49.9 (MORBID OBESITY) (HCC): Primary | ICD-10-CM

## 2021-05-24 PROCEDURE — 3008F BODY MASS INDEX DOCD: CPT | Performed by: NURSE PRACTITIONER

## 2021-05-24 PROCEDURE — 1036F TOBACCO NON-USER: CPT | Performed by: NURSE PRACTITIONER

## 2021-05-24 PROCEDURE — 3079F DIAST BP 80-89 MM HG: CPT | Performed by: NURSE PRACTITIONER

## 2021-05-24 PROCEDURE — 3075F SYST BP GE 130 - 139MM HG: CPT | Performed by: NURSE PRACTITIONER

## 2021-05-24 PROCEDURE — 99214 OFFICE O/P EST MOD 30 MIN: CPT | Performed by: NURSE PRACTITIONER

## 2021-05-24 RX ORDER — AMLODIPINE BESYLATE 10 MG/1
TABLET ORAL
Qty: 30 TABLET | Refills: 0 | Status: SHIPPED | OUTPATIENT
Start: 2021-05-24 | End: 2022-05-14 | Stop reason: SDUPTHER

## 2021-05-24 RX ORDER — NAPROXEN 500 MG/1
500 TABLET ORAL 2 TIMES DAILY PRN
Qty: 60 TABLET | Refills: 2 | Status: SHIPPED | OUTPATIENT
Start: 2021-05-24 | End: 2021-08-23

## 2021-05-24 RX ORDER — ALBUTEROL SULFATE 90 UG/1
2 AEROSOL, METERED RESPIRATORY (INHALATION) EVERY 6 HOURS PRN
Qty: 8.5 G | Refills: 0 | Status: SHIPPED | OUTPATIENT
Start: 2021-05-24 | End: 2021-06-16

## 2021-05-24 RX ORDER — MONTELUKAST SODIUM 10 MG/1
TABLET ORAL
Qty: 30 TABLET | Refills: 0 | Status: SHIPPED | OUTPATIENT
Start: 2021-05-24 | End: 2022-03-14 | Stop reason: SDUPTHER

## 2021-05-24 RX ORDER — HYDROCHLOROTHIAZIDE 25 MG/1
50 TABLET ORAL DAILY
Qty: 180 TABLET | Refills: 1 | Status: SHIPPED | OUTPATIENT
Start: 2021-05-24 | End: 2021-06-03

## 2021-05-24 RX ORDER — MONTELUKAST SODIUM 10 MG/1
10 TABLET ORAL
Qty: 90 TABLET | Refills: 1 | Status: SHIPPED | OUTPATIENT
Start: 2021-05-24 | End: 2021-11-16

## 2021-05-24 RX ORDER — ASPIRIN 81 MG/1
81 TABLET ORAL DAILY
Qty: 90 TABLET | Refills: 1 | Status: SHIPPED | OUTPATIENT
Start: 2021-05-24

## 2021-05-24 RX ORDER — AMLODIPINE BESYLATE 10 MG/1
10 TABLET ORAL DAILY
Qty: 90 TABLET | Refills: 1 | Status: SHIPPED | OUTPATIENT
Start: 2021-05-24 | End: 2021-11-16

## 2021-05-24 NOTE — PATIENT INSTRUCTIONS
Obtain Cock-up brace for wrist     Carpal Tunnel Syndrome Exercises   AMBULATORY CARE:   What you need to know about carpal tunnel syndrome (CTS) exercises:  CTS exercises can help relieve pain and increase your range of motion  Your healthcare provider or physical therapist can tell you how often to do the exercises  CTS exercises:   · Finger extensions:  Hold your fingers and thumb close together  Keep them straight  Put a rubber band around the outside of your fingers and thumb  Spread your fingers apart  Then slowly bring them together without letting the rubber band fall off  Repeat 40 times  · Wrist flexor stretch:  Hold your arm out straight  Grasp your fingers with your other hand  Slowly bend the fingers back (palm facing away) until you feel a stretch in your wrist  Hold for 10 seconds  Repeat 5 times  · Wrist extensor stretch:  Hold your arm out straight  Grasp your fingers with your other hand  Slowly bend the fingers and hand down (palm facing you) until you feel a stretch on top of your hand  Hold for 10 seconds  Repeat 5 times  · Wrist curls without weights:  Sit in a chair with your forearm resting on your thigh or a table  With your palm facing down, flex your wrist up 3 inches and slowly lower it  Turn your forearm over and repeat with your palm facing up  Do each exercise 20 times  · Shrugs:  Stand with your arms by your side  Lift your shoulders up to your ears and hold for 1 second  Then pull your shoulders back, pinching your shoulder blades together  Hold for 1 second  Then relax your shoulders  Repeat 20 times  © Copyright [employer] 2021 Information is for End User's use only and may not be sold, redistributed or otherwise used for commercial purposes  All illustrations and images included in CareNotes® are the copyrighted property of [employer]  or Manasa Schulz   The above information is an  only   It is not intended as medical advice for individual conditions or treatments  Talk to your doctor, nurse or pharmacist before following any medical regimen to see if it is safe and effective for you

## 2021-05-24 NOTE — ASSESSMENT & PLAN NOTE
Following with [facility] service  Lost 20 pounds since last encounter 3 mos  Ago  Acknowledged recent success and encouraged continue wgt loss

## 2021-05-24 NOTE — ASSESSMENT & PLAN NOTE
Likely 2/2 work as a  - repetitive work with hands  Advised ice, elevation, naproxen, "cock up" wrist brace at night, handout with wrist exercises

## 2021-05-24 NOTE — PROGRESS NOTES
Assessment/Plan:    Problem List Items Addressed This Visit        Respiratory    Allergic rhinitis    Relevant Medications    montelukast (SINGULAIR) 10 mg tablet    naproxen (NAPROSYN) 500 mg tablet       Cardiovascular and Mediastinum    Essential hypertension     Stable  Continue current medication regimen  Will reassess at next scheduled follow-up  Wgt loss on track - will improve htn further  Relevant Medications    amLODIPine (NORVASC) 10 mg tablet    hydrochlorothiazide (HYDRODIURIL) 25 mg tablet       Musculoskeletal and Integument    Sprain of carpal joint of left wrist     Likely 2/2 work as a  - repetitive work with hands  Advised ice, elevation, naproxen, "cock up" wrist brace at night, handout with wrist exercises  Other    Chronic pain of both ankles    Relevant Medications    naproxen (NAPROSYN) 500 mg tablet    Obesity, Class III, BMI 40-49 9 (morbid obesity) ([facility] [address] ) - Primary     Following with [facility] service  Lost 20 pounds since last encounter 3 mos  Ago  Acknowledged recent success and encouraged continue wgt loss  Relevant Medications    aspirin (ECOTRIN LOW STRENGTH) 81 mg EC tablet      Other Visit Diagnoses     Wheezing        Relevant Medications    albuterol (PROVENTIL HFA,VENTOLIN HFA) 90 mcg/act inhaler            Return in about 6 months (around 11/24/2021) for Recheck htn, wgt loss  A chart review was performed and previous primary care visit notes were reviewed  All applicable imaging studies were reviewed and images were reviewed personally  All applicable laboratory studies were reviewed personally  Care everywhere review was performed if  available and all pertinent notes were reviewed       Subjective:     HPI: Pieter Galicia is a 47 y o  male who  has a past medical history of Ankle swelling, Arthritis, Asthma, Dizziness, Heartburn, Hypertension, Increased frequency of urination, Intolerance to heat, Joint pain, Morbid obesity with BMI of 50 0-59 9, adult [facility]), Sleep apnea, and SOB (shortness of breath)  who presented to the office today for a follow up  Patient was referred to [facility]  He had an initial appointment and was put on a conservative weight loss program   He has already lost 20 lb in the last 3 months  He states he feels better and his ankle pain is improving due to the weight loss  His blood pressure is also stable and should further improve  He states his breathing is improving and he rarely needs to use his inhaler  He recently started having moderate to severe left wrist pain  He works as a  which requires repetitive work with his wrists and hands  He has it wrapped in an Ace bandage  He has been taking naproxen for pain  He has no other new health concerns at this time      The following portions of the patient's history were reviewed and updated as appropriate: allergies, current medications, past family history, past medical history, past social history, past surgical history, and problem list     Current Outpatient Medications on File Prior to Visit   Medication Sig Dispense Refill    fluticasone (FLONASE) 50 mcg/act nasal spray 2 sprays into each nostril daily 1 Bottle 11    [DISCONTINUED] albuterol (PROVENTIL HFA,VENTOLIN HFA) 90 mcg/act inhaler INHALE 2 PUFFS BY MOUTH EVERY 6 HOURS AS NEEDED FOR WHEEZING 8 5 g 0    [DISCONTINUED] amLODIPine (NORVASC) 10 mg tablet TAKE 1 TABLET(10 MG) BY MOUTH DAILY 30 tablet 0    [DISCONTINUED] aspirin (ECOTRIN LOW STRENGTH) 81 mg EC tablet Take 81 mg by mouth daily      [DISCONTINUED] hydrochlorothiazide (HYDRODIURIL) 25 mg tablet TAKE 2 TABLETS(50 MG) BY MOUTH DAILY 30 tablet 0    [DISCONTINUED] montelukast (SINGULAIR) 10 mg tablet TAKE 1 TABLET(10 MG) BY MOUTH DAILY AT BEDTIME 30 tablet 0    [DISCONTINUED] naproxen (NAPROSYN) 500 mg tablet Take 1 tablet (500 mg total) by mouth 2 (two) times a day as needed for moderate pain 60 tablet 2     No current facility-administered medications on file prior to visit  Review of Systems   Constitutional: Negative for activity change, chills, fatigue, fever and unexpected weight change  HENT: Negative for congestion, ear pain, hearing loss, rhinorrhea, sinus pressure and sore throat  Eyes: Negative for pain and visual disturbance  Respiratory: Negative for cough, shortness of breath and wheezing  Cardiovascular: Negative for chest pain, palpitations and leg swelling  Gastrointestinal: Negative for abdominal pain, nausea and vomiting  Genitourinary: Negative for dysuria and hematuria  Musculoskeletal: Positive for arthralgias  Negative for back pain, gait problem, joint swelling and myalgias  Skin: Negative for color change and rash  Neurological: Negative for dizziness, tremors, seizures, syncope, weakness, light-headedness and headaches  Psychiatric/Behavioral: Negative for agitation, behavioral problems, confusion, dysphoric mood, self-injury, sleep disturbance and suicidal ideas  The patient is not nervous/anxious  All other systems reviewed and are negative  Objective:    /88   Pulse 104   Temp 98 2 °F (36 8 °C) (Temporal)   Resp 18   Ht 5' 1 5" (1 562 m)   Wt 111 kg (245 lb)   SpO2 98%   BMI 45 54 kg/m²     Physical Exam  Constitutional:       Appearance: Normal appearance  He is obese  HENT:      Head: Normocephalic  Right Ear: Tympanic membrane, ear canal and external ear normal  There is no impacted cerumen  Left Ear: Tympanic membrane, ear canal and external ear normal  There is no impacted cerumen  Nose: Nose normal       Mouth/Throat:      Mouth: Mucous membranes are moist    Eyes:      Extraocular Movements: Extraocular movements intact  Conjunctiva/sclera: Conjunctivae normal       Pupils: Pupils are equal, round, and reactive to light  Neck:      Musculoskeletal: Normal range of motion     Cardiovascular: Rate and Rhythm: Normal rate and regular rhythm  Pulses: Normal pulses  Heart sounds: Normal heart sounds  Pulmonary:      Effort: Pulmonary effort is normal       Breath sounds: Normal breath sounds  Abdominal:      General: Bowel sounds are normal       Palpations: Abdomen is soft  Tenderness: There is no abdominal tenderness  Musculoskeletal:         General: No signs of injury  Left wrist: He exhibits decreased range of motion and tenderness  He exhibits no swelling  Right ankle: He exhibits decreased range of motion  Tenderness  Left ankle: He exhibits decreased range of motion  Tenderness  Right lower leg: No edema  Left lower leg: No edema  Skin:     General: Skin is warm and dry  Capillary Refill: Capillary refill takes less than 2 seconds  Findings: No bruising, lesion or rash  Neurological:      General: No focal deficit present  Mental Status: He is alert and oriented to person, place, and time  Psychiatric:         Mood and Affect: Mood normal          Behavior: Behavior normal          Thought Content: Thought content normal          KATARINA Franks  05/24/21  9:44 AM    Patient Instructions     Obtain Cock-up brace for wrist     Carpal Tunnel Syndrome Exercises   AMBULATORY CARE:   What you need to know about carpal tunnel syndrome (CTS) exercises:  CTS exercises can help relieve pain and increase your range of motion  Your healthcare provider or physical therapist can tell you how often to do the exercises  CTS exercises:   · Finger extensions:  Hold your fingers and thumb close together  Keep them straight  Put a rubber band around the outside of your fingers and thumb  Spread your fingers apart  Then slowly bring them together without letting the rubber band fall off  Repeat 40 times  · Wrist flexor stretch:  Hold your arm out straight  Grasp your fingers with your other hand   Slowly bend the fingers back (palm facing away) until you feel a stretch in your wrist  Hold for 10 seconds  Repeat 5 times  · Wrist extensor stretch:  Hold your arm out straight  Grasp your fingers with your other hand  Slowly bend the fingers and hand down (palm facing you) until you feel a stretch on top of your hand  Hold for 10 seconds  Repeat 5 times  · Wrist curls without weights:  Sit in a chair with your forearm resting on your thigh or a table  With your palm facing down, flex your wrist up 3 inches and slowly lower it  Turn your forearm over and repeat with your palm facing up  Do each exercise 20 times  · Shrugs:  Stand with your arms by your side  Lift your shoulders up to your ears and hold for 1 second  Then pull your shoulders back, pinching your shoulder blades together  Hold for 1 second  Then relax your shoulders  Repeat 20 times  © Copyright [employer] 2021 Information is for End User's use only and may not be sold, redistributed or otherwise used for commercial purposes  All illustrations and images included in CareNotes® are the copyrighted property of [employer]  or Manasa Schulz   The above information is an  only  It is not intended as medical advice for individual conditions or treatments  Talk to your doctor, nurse or pharmacist before following any medical regimen to see if it is safe and effective for you

## 2021-05-24 NOTE — ASSESSMENT & PLAN NOTE
Stable  Continue current medication regimen  Will reassess at next scheduled follow-up  Wgt loss on track - will improve htn further

## 2021-06-03 DIAGNOSIS — I10 ESSENTIAL HYPERTENSION: ICD-10-CM

## 2021-06-03 RX ORDER — HYDROCHLOROTHIAZIDE 25 MG/1
TABLET ORAL
Qty: 30 TABLET | Refills: 6 | Status: SHIPPED | OUTPATIENT
Start: 2021-06-03 | End: 2022-02-18 | Stop reason: HOSPADM

## 2021-06-16 DIAGNOSIS — R06.2 WHEEZING: ICD-10-CM

## 2021-06-16 RX ORDER — ALBUTEROL SULFATE 90 UG/1
AEROSOL, METERED RESPIRATORY (INHALATION)
Qty: 8.5 G | Refills: 0 | Status: SHIPPED | OUTPATIENT
Start: 2021-06-16 | End: 2021-07-15

## 2021-07-14 DIAGNOSIS — R06.2 WHEEZING: ICD-10-CM

## 2021-07-15 RX ORDER — ALBUTEROL SULFATE 90 UG/1
AEROSOL, METERED RESPIRATORY (INHALATION)
Qty: 8.5 G | Refills: 0 | Status: SHIPPED | OUTPATIENT
Start: 2021-07-15 | End: 2021-08-16

## 2021-08-16 DIAGNOSIS — R06.2 WHEEZING: ICD-10-CM

## 2021-08-16 RX ORDER — ALBUTEROL SULFATE 90 UG/1
AEROSOL, METERED RESPIRATORY (INHALATION)
Qty: 8.5 G | Refills: 0 | Status: SHIPPED | OUTPATIENT
Start: 2021-08-16 | End: 2021-09-13

## 2021-08-21 DIAGNOSIS — M25.572 CHRONIC PAIN OF BOTH ANKLES: ICD-10-CM

## 2021-08-21 DIAGNOSIS — G89.29 CHRONIC PAIN OF BOTH ANKLES: ICD-10-CM

## 2021-08-21 DIAGNOSIS — M25.571 CHRONIC PAIN OF BOTH ANKLES: ICD-10-CM

## 2021-08-23 RX ORDER — NAPROXEN 500 MG/1
TABLET ORAL
Qty: 60 TABLET | Refills: 2 | Status: SHIPPED | OUTPATIENT
Start: 2021-08-23 | End: 2021-11-08

## 2021-09-12 DIAGNOSIS — R06.2 WHEEZING: ICD-10-CM

## 2021-09-13 ENCOUNTER — TELEPHONE (OUTPATIENT)
Dept: SLEEP CENTER | Facility: CLINIC | Age: 41
End: 2021-09-13

## 2021-09-13 DIAGNOSIS — G47.33 OSA (OBSTRUCTIVE SLEEP APNEA): Primary | ICD-10-CM

## 2021-09-13 RX ORDER — ALBUTEROL SULFATE 90 UG/1
AEROSOL, METERED RESPIRATORY (INHALATION)
Qty: 8.5 G | Refills: 0 | Status: SHIPPED | OUTPATIENT
Start: 2021-09-13 | End: 2021-10-12

## 2021-10-12 DIAGNOSIS — R06.2 WHEEZING: ICD-10-CM

## 2021-10-12 RX ORDER — ALBUTEROL SULFATE 90 UG/1
AEROSOL, METERED RESPIRATORY (INHALATION)
Qty: 8.5 G | Refills: 0 | Status: SHIPPED | OUTPATIENT
Start: 2021-10-12 | End: 2021-11-10

## 2021-11-08 DIAGNOSIS — G89.29 CHRONIC PAIN OF BOTH ANKLES: ICD-10-CM

## 2021-11-08 DIAGNOSIS — M25.571 CHRONIC PAIN OF BOTH ANKLES: ICD-10-CM

## 2021-11-08 DIAGNOSIS — M25.572 CHRONIC PAIN OF BOTH ANKLES: ICD-10-CM

## 2021-11-08 RX ORDER — NAPROXEN 500 MG/1
TABLET ORAL
Qty: 60 TABLET | Refills: 2 | Status: SHIPPED | OUTPATIENT
Start: 2021-11-08 | End: 2022-02-18 | Stop reason: HOSPADM

## 2021-11-10 DIAGNOSIS — R06.2 WHEEZING: ICD-10-CM

## 2021-11-10 RX ORDER — ALBUTEROL SULFATE 90 UG/1
AEROSOL, METERED RESPIRATORY (INHALATION)
Qty: 8.5 G | Refills: 0 | Status: SHIPPED | OUTPATIENT
Start: 2021-11-10 | End: 2022-04-12 | Stop reason: SDUPTHER

## 2021-11-16 DIAGNOSIS — I10 ESSENTIAL HYPERTENSION: ICD-10-CM

## 2021-11-16 DIAGNOSIS — J30.9 ALLERGIC RHINITIS, UNSPECIFIED SEASONALITY, UNSPECIFIED TRIGGER: ICD-10-CM

## 2021-11-16 RX ORDER — AMLODIPINE BESYLATE 10 MG/1
TABLET ORAL
Qty: 90 TABLET | Refills: 1 | Status: SHIPPED | OUTPATIENT
Start: 2021-11-16 | End: 2022-02-16

## 2021-11-16 RX ORDER — MONTELUKAST SODIUM 10 MG/1
TABLET ORAL
Qty: 90 TABLET | Refills: 1 | Status: SHIPPED | OUTPATIENT
Start: 2021-11-16 | End: 2022-02-16

## 2022-01-11 ENCOUNTER — OFFICE VISIT (OUTPATIENT)
Dept: SLEEP CENTER | Facility: CLINIC | Age: 42
End: 2022-01-11
Payer: MEDICARE

## 2022-01-11 VITALS
DIASTOLIC BLOOD PRESSURE: 70 MMHG | WEIGHT: 256.4 LBS | HEIGHT: 62 IN | SYSTOLIC BLOOD PRESSURE: 122 MMHG | BODY MASS INDEX: 47.18 KG/M2

## 2022-01-11 DIAGNOSIS — G47.33 OSA (OBSTRUCTIVE SLEEP APNEA): Primary | ICD-10-CM

## 2022-01-11 PROCEDURE — 99213 OFFICE O/P EST LOW 20 MIN: CPT | Performed by: INTERNAL MEDICINE

## 2022-01-11 NOTE — PROGRESS NOTES
Progress Note - Sleep Center   Tabitha Lowe :1980 MRN: [id number]      Reason for Visit:  46 y o male here for annual follow-up    Assessment:  Doing well on current therapy of BiPAP 25/20 cm for very severe EVE (AHI = 134 0)  Plan:  Continue same    Follow up: One year    History of Present Illness:  History of EVE on PAP therapy  Fully compliant and deriving benefit  Review of Systems      Genitourinary need to urinate more than twice a night   Cardiology ankle/leg swelling   Gastrointestinal none   Neurology none   Constitutional none   Integumentary none   Psychiatry none   Musculoskeletal joint pain   Pulmonary none   ENT none   Endocrine none   Hematological none           I have reviewed and updated the review of systems as necessary      Historical Information    Past Medical History:   Past Medical History:   Diagnosis Date    Ankle swelling     Arthritis     Asthma     Dizziness     Heartburn     Hypertension     Increased frequency of urination     Intolerance to heat     Joint pain     and joint swelling    Morbid obesity with BMI of 50 0-59 9, adult ([facility] ) 2019    Sleep apnea     SOB (shortness of breath)          Past Surgical History: No past surgical history on file      Social History:   Social History     Socioeconomic History    Marital status: /Civil Union     Spouse name: Not on file    Number of children: Not on file    Years of education: Not on file    Highest education level: Not on file   Occupational History    Not on file   Tobacco Use    Smoking status: Never Smoker    Smokeless tobacco: Never Used   Vaping Use    Vaping Use: Never used   Substance and Sexual Activity    Alcohol use: No    Drug use: No    Sexual activity: Not on file   Other Topics Concern    Not on file   Social History Narrative    Not on file     Social Determinants of Health     Financial Resource Strain: Not on file   Food Insecurity: Not on file   Transportation Needs: Not on file   Physical Activity: Not on file   Stress: Not on file   Social Connections: Not on file   Intimate Partner Violence: Not on file   Housing Stability: Not on file       Family History:   Family History   Problem Relation Age of Onset    Hypertension Mother     Throat cancer Father     No Known Problems Sister     No Known Problems Sister     Hypertension Sister     Diabetes Neg Hx     Heart disease Neg Hx     Stroke Neg Hx     Thyroid disease Neg Hx        Medications/Allergies:      Current Outpatient Medications:     albuterol (PROVENTIL HFA,VENTOLIN HFA) 90 mcg/act inhaler, INHALE 2 PUFFS BY MOUTH EVERY 6 HOURS AS NEEDED FOR WHEEZING, Disp: 8 5 g, Rfl: 0    amLODIPine (NORVASC) 10 mg tablet, TAKE 1 TABLET(10 MG) BY MOUTH DAILY, Disp: 30 tablet, Rfl: 0    amLODIPine (NORVASC) 10 mg tablet, TAKE 1 TABLET(10 MG) BY MOUTH DAILY, Disp: 90 tablet, Rfl: 1    aspirin (ECOTRIN LOW STRENGTH) 81 mg EC tablet, Take 1 tablet (81 mg total) by mouth daily, Disp: 90 tablet, Rfl: 1    fluticasone (FLONASE) 50 mcg/act nasal spray, 2 sprays into each nostril daily, Disp: 1 Bottle, Rfl: 11    hydrochlorothiazide (HYDRODIURIL) 25 mg tablet, TAKE 2 TABLETS(50 MG) BY MOUTH DAILY, Disp: 30 tablet, Rfl: 6    montelukast (SINGULAIR) 10 mg tablet, TAKE 1 TABLET(10 MG) BY MOUTH DAILY AT BEDTIME, Disp: 30 tablet, Rfl: 0    montelukast (SINGULAIR) 10 mg tablet, TAKE 1 TABLET(10 MG) BY MOUTH DAILY AT BEDTIME, Disp: 90 tablet, Rfl: 1    naproxen (NAPROSYN) 500 mg tablet, TAKE 1 TABLET(500 MG) BY MOUTH TWICE DAILY AS NEEDED FOR MODERATE PAIN, Disp: 60 tablet, Rfl: 2          Objective      Vital Signs:   Vitals:    01/11/22 0837   BP: 122/70     [place] Sleepiness Scale:  Total score: 7        Physical Exam:    General: Alert, appropriate, cooperative, overweight    Head: NC/AT    Skin: Warm, dry    Neuro: No motor abnormalities, cranial nerves appear intact    Extremity: No clubbing, cyanosis      DME Provider: [employer]        Counseling / Coordination of Care   I have spent 15 minutes with the patient today in which greater than 50% of this time was spent in counseling/coordination of care regarding: equipment and compliance  Board Certified Sleep Specialist    Portions of the record may have been created with voice recognition software  Occasional wrong word or "sound a like" substitutions may have occurred due to the inherent limitations of voice recognition software  Read the chart carefully and recognize, using context, where substitutions have occurred

## 2022-01-12 ENCOUNTER — TELEPHONE (OUTPATIENT)
Dept: SLEEP CENTER | Facility: CLINIC | Age: 42
End: 2022-01-12

## 2022-02-16 ENCOUNTER — HOSPITAL ENCOUNTER (INPATIENT)
Facility: HOSPITAL | Age: 42
LOS: 2 days | Discharge: HOME | DRG: 351 | End: 2022-02-18
Attending: EMERGENCY MEDICINE | Admitting: INTERNAL MEDICINE
Payer: MEDICARE

## 2022-02-16 ENCOUNTER — APPOINTMENT (EMERGENCY)
Dept: VASCULAR ULTRASOUND | Facility: HOSPITAL | Age: 42
DRG: 351 | End: 2022-02-16
Payer: MEDICARE

## 2022-02-16 ENCOUNTER — APPOINTMENT (INPATIENT)
Dept: INTERVENTIONAL RADIOLOGY/VASCULAR | Facility: HOSPITAL | Age: 42
DRG: 351 | End: 2022-02-16
Attending: RADIOLOGY
Payer: MEDICARE

## 2022-02-16 ENCOUNTER — APPOINTMENT (EMERGENCY)
Dept: RADIOLOGY | Facility: HOSPITAL | Age: 42
DRG: 351 | End: 2022-02-16
Payer: MEDICARE

## 2022-02-16 DIAGNOSIS — M13.0 POLYARTHRITIS: ICD-10-CM

## 2022-02-16 DIAGNOSIS — M00.9 PYOGENIC ARTHRITIS OF RIGHT KNEE JOINT (HCC): ICD-10-CM

## 2022-02-16 DIAGNOSIS — A41.9 ACUTE SEPSIS (HCC): Primary | ICD-10-CM

## 2022-02-16 DIAGNOSIS — R50.9 FEVER: ICD-10-CM

## 2022-02-16 DIAGNOSIS — R65.10 SIRS (SYSTEMIC INFLAMMATORY RESPONSE SYNDROME) (HCC): ICD-10-CM

## 2022-02-16 LAB
2HR DELTA HS TROPONIN: 1 NG/L
ALBUMIN SERPL BCP-MCNC: 4.2 G/DL (ref 3.4–4.8)
ALP SERPL-CCNC: 51 U/L (ref 10–129)
ALT SERPL W P-5'-P-CCNC: 20 U/L (ref 5–63)
ANION GAP SERPL CALCULATED.3IONS-SCNC: 13 MMOL/L (ref 4–13)
APTT PPP: 41 SECONDS (ref 23–37)
AST SERPL W P-5'-P-CCNC: 15 U/L (ref 15–41)
ATRIAL RATE: 130 BPM
BASOPHILS # BLD AUTO: 0.06 THOUSANDS/ΜL (ref 0–0.1)
BASOPHILS NFR BLD AUTO: 0 % (ref 0–1)
BILIRUB SERPL-MCNC: 0.57 MG/DL (ref 0.3–1.2)
BUN SERPL-MCNC: 16 MG/DL (ref 6–20)
CALCIUM SERPL-MCNC: 9.7 MG/DL (ref 8.4–10.2)
CARDIAC TROPONIN I PNL SERPL HS: 5 NG/L
CARDIAC TROPONIN I PNL SERPL HS: 6 NG/L
CHLORIDE SERPL-SCNC: 98 MMOL/L (ref 96–108)
CO2 SERPL-SCNC: 26 MMOL/L (ref 22–33)
CREAT SERPL-MCNC: 1.3 MG/DL (ref 0.5–1.2)
CRP SERPL QL: 19.6 MG/DL (ref 0–1)
EOSINOPHIL # BLD AUTO: 0.11 THOUSAND/ΜL (ref 0–0.61)
EOSINOPHIL NFR BLD AUTO: 1 % (ref 0–6)
ERYTHROCYTE [DISTWIDTH] IN BLOOD BY AUTOMATED COUNT: 13.8 % (ref 11.6–15.1)
ERYTHROCYTE [SEDIMENTATION RATE] IN BLOOD: 28 MM/HOUR (ref 0–15)
FLUAV RNA RESP QL NAA+PROBE: NEGATIVE
FLUBV RNA RESP QL NAA+PROBE: NEGATIVE
GFR SERPL CREATININE-BSD FRML MDRD: 68 ML/MIN/1.73SQ M
GLUCOSE SERPL-MCNC: 117 MG/DL (ref 65–140)
HCT VFR BLD AUTO: 43.5 % (ref 36.5–49.3)
HGB BLD-MCNC: 14 G/DL (ref 12–17)
IMM GRANULOCYTES # BLD AUTO: 0.19 THOUSAND/UL (ref 0–0.2)
IMM GRANULOCYTES NFR BLD AUTO: 1 % (ref 0–2)
INR PPP: 1.07 (ref 0.84–1.19)
LACTATE SERPL-SCNC: 2.3 MMOL/L (ref 0–2)
LYMPHOCYTES # BLD AUTO: 2.39 THOUSANDS/ΜL (ref 0.6–4.47)
LYMPHOCYTES NFR BLD AUTO: 12 % (ref 14–44)
MCH RBC QN AUTO: 26.4 PG (ref 26.8–34.3)
MCHC RBC AUTO-ENTMCNC: 32.2 G/DL (ref 31.4–37.4)
MCV RBC AUTO: 82 FL (ref 82–98)
MONOCYTES # BLD AUTO: 1.24 THOUSAND/ΜL (ref 0.17–1.22)
MONOCYTES NFR BLD AUTO: 6 % (ref 4–12)
NEUTROPHILS # BLD AUTO: 15.26 THOUSANDS/ΜL (ref 1.85–7.62)
NEUTS SEG NFR BLD AUTO: 80 % (ref 43–75)
NRBC BLD AUTO-RTO: 0 /100 WBCS
P AXIS: 22 DEGREES
PLATELET # BLD AUTO: 480 THOUSANDS/UL (ref 149–390)
PMV BLD AUTO: 10 FL (ref 8.9–12.7)
POTASSIUM SERPL-SCNC: 3.3 MMOL/L (ref 3.5–5)
PR INTERVAL: 115 MS
PROCALCITONIN SERPL-MCNC: 0.26 NG/ML
PROT SERPL-MCNC: 9.8 G/DL (ref 6.4–8.3)
PROTHROMBIN TIME: 13.8 SECONDS (ref 11.6–14.5)
QRS AXIS: 70 DEGREES
QRSD INTERVAL: 94 MS
QT INTERVAL: 296 MS
QTC INTERVAL: 434 MS
RBC # BLD AUTO: 5.31 MILLION/UL (ref 3.88–5.62)
RSV RNA RESP QL NAA+PROBE: NEGATIVE
SARS-COV-2 RNA RESP QL NAA+PROBE: NEGATIVE
SODIUM SERPL-SCNC: 137 MMOL/L (ref 133–145)
T WAVE AXIS: 0 DEGREES
URATE SERPL-MCNC: 10.6 MG/DL (ref 3.4–8.7)
VENTRICULAR RATE: 129 BPM
WBC # BLD AUTO: 19.25 THOUSAND/UL (ref 4.31–10.16)

## 2022-02-16 PROCEDURE — 99285 EMERGENCY DEPT VISIT HI MDM: CPT | Performed by: EMERGENCY MEDICINE

## 2022-02-16 PROCEDURE — 96365 THER/PROPH/DIAG IV INF INIT: CPT

## 2022-02-16 PROCEDURE — 93971 EXTREMITY STUDY: CPT

## 2022-02-16 PROCEDURE — NC001 PR NO CHARGE: Performed by: RADIOLOGY

## 2022-02-16 PROCEDURE — 80053 COMPREHEN METABOLIC PANEL: CPT | Performed by: EMERGENCY MEDICINE

## 2022-02-16 PROCEDURE — 84550 ASSAY OF BLOOD/URIC ACID: CPT | Performed by: INTERNAL MEDICINE

## 2022-02-16 PROCEDURE — 86140 C-REACTIVE PROTEIN: CPT | Performed by: EMERGENCY MEDICINE

## 2022-02-16 PROCEDURE — 0S9C3ZZ DRAINAGE OF RIGHT KNEE JOINT, PERCUTANEOUS APPROACH: ICD-10-PCS | Performed by: INTERNAL MEDICINE

## 2022-02-16 PROCEDURE — 36415 COLL VENOUS BLD VENIPUNCTURE: CPT | Performed by: EMERGENCY MEDICINE

## 2022-02-16 PROCEDURE — 99222 1ST HOSP IP/OBS MODERATE 55: CPT | Performed by: PHYSICIAN ASSISTANT

## 2022-02-16 PROCEDURE — 96375 TX/PRO/DX INJ NEW DRUG ADDON: CPT

## 2022-02-16 PROCEDURE — 20611 DRAIN/INJ JOINT/BURSA W/US: CPT

## 2022-02-16 PROCEDURE — 71045 X-RAY EXAM CHEST 1 VIEW: CPT

## 2022-02-16 PROCEDURE — 87070 CULTURE OTHR SPECIMN AEROBIC: CPT | Performed by: INTERNAL MEDICINE

## 2022-02-16 PROCEDURE — 94660 CPAP INITIATION&MGMT: CPT

## 2022-02-16 PROCEDURE — 93971 EXTREMITY STUDY: CPT | Performed by: SURGERY

## 2022-02-16 PROCEDURE — 73564 X-RAY EXAM KNEE 4 OR MORE: CPT

## 2022-02-16 PROCEDURE — 85652 RBC SED RATE AUTOMATED: CPT | Performed by: INTERNAL MEDICINE

## 2022-02-16 PROCEDURE — 84145 PROCALCITONIN (PCT): CPT | Performed by: EMERGENCY MEDICINE

## 2022-02-16 PROCEDURE — 84484 ASSAY OF TROPONIN QUANT: CPT | Performed by: EMERGENCY MEDICINE

## 2022-02-16 PROCEDURE — 99285 EMERGENCY DEPT VISIT HI MDM: CPT

## 2022-02-16 PROCEDURE — 93010 ELECTROCARDIOGRAM REPORT: CPT | Performed by: INTERNAL MEDICINE

## 2022-02-16 PROCEDURE — 83605 ASSAY OF LACTIC ACID: CPT | Performed by: EMERGENCY MEDICINE

## 2022-02-16 PROCEDURE — 85610 PROTHROMBIN TIME: CPT | Performed by: EMERGENCY MEDICINE

## 2022-02-16 PROCEDURE — 87040 BLOOD CULTURE FOR BACTERIA: CPT | Performed by: EMERGENCY MEDICINE

## 2022-02-16 PROCEDURE — 85730 THROMBOPLASTIN TIME PARTIAL: CPT | Performed by: EMERGENCY MEDICINE

## 2022-02-16 PROCEDURE — 0241U HB NFCT DS VIR RESP RNA 4 TRGT: CPT | Performed by: EMERGENCY MEDICINE

## 2022-02-16 PROCEDURE — 99223 1ST HOSP IP/OBS HIGH 75: CPT | Performed by: INTERNAL MEDICINE

## 2022-02-16 PROCEDURE — 85025 COMPLETE CBC W/AUTO DIFF WBC: CPT | Performed by: EMERGENCY MEDICINE

## 2022-02-16 PROCEDURE — 94760 N-INVAS EAR/PLS OXIMETRY 1: CPT

## 2022-02-16 PROCEDURE — 87205 SMEAR GRAM STAIN: CPT | Performed by: INTERNAL MEDICINE

## 2022-02-16 PROCEDURE — 93005 ELECTROCARDIOGRAM TRACING: CPT

## 2022-02-16 PROCEDURE — 96360 HYDRATION IV INFUSION INIT: CPT

## 2022-02-16 PROCEDURE — 96367 TX/PROPH/DG ADDL SEQ IV INF: CPT

## 2022-02-16 PROCEDURE — 20611 DRAIN/INJ JOINT/BURSA W/US: CPT | Performed by: RADIOLOGY

## 2022-02-16 RX ORDER — OXYCODONE HYDROCHLORIDE 5 MG/1
2.5 TABLET ORAL EVERY 4 HOURS PRN
Status: DISCONTINUED | OUTPATIENT
Start: 2022-02-16 | End: 2022-02-17

## 2022-02-16 RX ORDER — CEFEPIME HYDROCHLORIDE 2 G/50ML
2000 INJECTION, SOLUTION INTRAVENOUS ONCE
Status: COMPLETED | OUTPATIENT
Start: 2022-02-16 | End: 2022-02-16

## 2022-02-16 RX ORDER — KETOROLAC TROMETHAMINE 30 MG/ML
15 INJECTION, SOLUTION INTRAMUSCULAR; INTRAVENOUS ONCE
Status: COMPLETED | OUTPATIENT
Start: 2022-02-16 | End: 2022-02-16

## 2022-02-16 RX ORDER — ACETAMINOPHEN 325 MG/1
975 TABLET ORAL ONCE
Status: COMPLETED | OUTPATIENT
Start: 2022-02-16 | End: 2022-02-16

## 2022-02-16 RX ORDER — CEFTRIAXONE 1 G/50ML
1000 INJECTION, SOLUTION INTRAVENOUS EVERY 24 HOURS
Status: DISCONTINUED | OUTPATIENT
Start: 2022-02-16 | End: 2022-02-17

## 2022-02-16 RX ORDER — SODIUM CHLORIDE, SODIUM GLUCONATE, SODIUM ACETATE, POTASSIUM CHLORIDE, MAGNESIUM CHLORIDE, SODIUM PHOSPHATE, DIBASIC, AND POTASSIUM PHOSPHATE .53; .5; .37; .037; .03; .012; .00082 G/100ML; G/100ML; G/100ML; G/100ML; G/100ML; G/100ML; G/100ML
1000 INJECTION, SOLUTION INTRAVENOUS ONCE
Status: COMPLETED | OUTPATIENT
Start: 2022-02-16 | End: 2022-02-16

## 2022-02-16 RX ORDER — ACETAMINOPHEN 325 MG/1
650 TABLET ORAL EVERY 6 HOURS PRN
Status: DISCONTINUED | OUTPATIENT
Start: 2022-02-16 | End: 2022-02-18 | Stop reason: HOSPADM

## 2022-02-16 RX ORDER — LIDOCAINE WITH 8.4% SOD BICARB 0.9%(10ML)
SYRINGE (ML) INJECTION CODE/TRAUMA/SEDATION MEDICATION
Status: COMPLETED | OUTPATIENT
Start: 2022-02-16 | End: 2022-02-16

## 2022-02-16 RX ADMIN — CEFEPIME HYDROCHLORIDE 2000 MG: 2 INJECTION, SOLUTION INTRAVENOUS at 12:51

## 2022-02-16 RX ADMIN — KETOROLAC TROMETHAMINE 15 MG: 30 INJECTION, SOLUTION INTRAMUSCULAR at 12:52

## 2022-02-16 RX ADMIN — SODIUM CHLORIDE 1750 ML: 0.9 INJECTION, SOLUTION INTRAVENOUS at 13:18

## 2022-02-16 RX ADMIN — VANCOMYCIN HYDROCHLORIDE 1750 MG: 1 INJECTION, POWDER, LYOPHILIZED, FOR SOLUTION INTRAVENOUS at 13:19

## 2022-02-16 RX ADMIN — ENOXAPARIN SODIUM 40 MG: 40 INJECTION SUBCUTANEOUS at 18:11

## 2022-02-16 RX ADMIN — ACETAMINOPHEN 650 MG: 325 TABLET, FILM COATED ORAL at 20:01

## 2022-02-16 RX ADMIN — ACETAMINOPHEN 975 MG: 325 TABLET, FILM COATED ORAL at 12:51

## 2022-02-16 RX ADMIN — SODIUM CHLORIDE, SODIUM GLUCONATE, SODIUM ACETATE, POTASSIUM CHLORIDE, MAGNESIUM CHLORIDE, SODIUM PHOSPHATE, DIBASIC, AND POTASSIUM PHOSPHATE 1000 ML: .53; .5; .37; .037; .03; .012; .00082 INJECTION, SOLUTION INTRAVENOUS at 18:11

## 2022-02-16 RX ADMIN — CEFTRIAXONE 1000 MG: 1 INJECTION, SOLUTION INTRAVENOUS at 18:11

## 2022-02-16 RX ADMIN — Medication 4 ML: at 16:14

## 2022-02-16 RX ADMIN — OXYCODONE HYDROCHLORIDE 2.5 MG: 5 TABLET ORAL at 22:28

## 2022-02-16 NOTE — ASSESSMENT & PLAN NOTE
-blood pressure mildly elevated likely secondary to pain  -continue on amlodipine 10 mg  -will hold hydrochlorothiazide a in the view of suspicion having joint pain that could be related to gout    Come be restarted once uric acid is between normal limits   -if needed patient did have metoprolol

## 2022-02-16 NOTE — CONSULTS
Consultation - David Merino 46 y o  male MRN: [id number]  Unit/Bed#: -01 Encounter: [id number]      Assessment/Plan     Assessment:  47 yo male with right knee pain, multiple joint pain    Plan:  · Low suspicion for septic right knee given exam  IR performing aspiration to send for fluid analysis  · Patient currently receiving IV antibiotics including ceftriaxone and vanco    · Pain control prn  · WBAT to RLE  · Ortho will sign off  Please contact if any issues  History of Present Illness   Physician Requesting Consult: Ayanna Hebert MD  Reason for Consult / Principal Problem: right knee pain  HPI: Robert Damico is a 46y o  year old male who presents with right knee pain x 3 days  Patient notes right knee soreness beginning on Saturday which worsened  By Sunday patient was having difficulty bearing weight  Knee is described as stiff  He notes multiple joint pain over several years  Patient denies any trauma or injury  He took tylenol and advil with no pain relief  Denies fevers or chills  He has not had any prior treatment for the right knee including surgery or injections in the past      Inpatient consult to Orthopedic Surgery  Consult performed by: Rusty Macedo PA-C  Consult ordered by: Ab Moncada MD          ROS: see HPI, all other systems negative  Historical Information   Past Medical History:   Diagnosis Date    Ankle swelling     Arthritis     Asthma     Dizziness     Heartburn     Hypertension     Increased frequency of urination     Intolerance to heat     Joint pain     and joint swelling    Morbid obesity with BMI of 50 0-59 9, adult ([facility] [address] ) 12/16/2019    Sleep apnea     SOB (shortness of breath)      History reviewed  No pertinent surgical history    Social History   Social History     Substance and Sexual Activity   Alcohol Use Never     Social History     Substance and Sexual Activity   Drug Use No     Social History     Tobacco Use   Smoking Status Never Smoker   Smokeless Tobacco Never Used     Family History:   Family History   Problem Relation Age of Onset    Hypertension Mother     Throat cancer Father     No Known Problems Sister     No Known Problems Sister     Hypertension Sister     Diabetes Neg Hx     Heart disease Neg Hx     Stroke Neg Hx     Thyroid disease Neg Hx        Meds/Allergies   Medications Prior to Admission   Medication    amLODIPine (NORVASC) 10 mg tablet    aspirin (ECOTRIN LOW STRENGTH) 81 mg EC tablet    hydrochlorothiazide (HYDRODIURIL) 25 mg tablet    montelukast (SINGULAIR) 10 mg tablet    albuterol (PROVENTIL HFA,VENTOLIN HFA) 90 mcg/act inhaler    fluticasone (FLONASE) 50 mcg/act nasal spray    naproxen (NAPROSYN) 500 mg tablet     No Known Allergies    Objective   Vitals: Blood pressure 135/79, pulse (!) 111, temperature 99 9 °F (37 7 °C), temperature source Tympanic, resp  rate 22, height 5' 1" (1 549 m), weight 118 kg (259 lb 11 2 oz), SpO2 98 %  ,Body mass index is 49 07 kg/m²  Intake/Output Summary (Last 24 hours) at 2/16/2022 1610  Last data filed at 2/16/2022 1325  Gross per 24 hour   Intake 50 ml   Output --   Net 50 ml     I/O last 24 hours: In: 48 [IV Piggyback:50]  Out: -     Invasive Devices  Report    Peripheral Intravenous Line            Peripheral IV 02/16/22 Left Antecubital <1 day    Peripheral IV 02/16/22 Left Wrist <1 day                PE:  Gen:  Awake and alert  HEENT:  Hearing intact  Heart:  Regular rate  Lungs: no audible wheezing  GI: no abdominal distension    Ortho Exam   Right Lower Extremity:  Inspection- no erythema, abrasions, or ecchymosis  Palpation- no TTP  Small effusion  No warmth  ROM- active 0- 95 degrees  No pain with passive motion  Neurovascular- Sensation intact L4- S1  Palpable dorsalis pedis pulse  AT/ GS/ EHL intact       Lab Results:   CBC:   Lab Results   Component Value Date    WBC 19 25 (H) 02/16/2022    HGB 14 0 02/16/2022    HCT 43 5 02/16/2022 MCV 82 02/16/2022     (H) 02/16/2022    MCH 26 4 (L) 02/16/2022    MCHC 32 2 02/16/2022    RDW 13 8 02/16/2022    MPV 10 0 02/16/2022    NRBC 0 02/16/2022     CMP:   Lab Results   Component Value Date    SODIUM 137 02/16/2022    CL 98 02/16/2022    CO2 26 02/16/2022    BUN 16 02/16/2022    CREATININE 1 30 (H) 02/16/2022    CALCIUM 9 7 02/16/2022    AST 15 02/16/2022    ALT 20 02/16/2022    ALKPHOS 51 0 02/16/2022    EGFR 68 02/16/2022     Imaging Studies: I have personally reviewed pertinent films in PACS   X-rays right knee- no acute osseous deformity     Code Status: Level 1 - Full Code  Advance Directive and Living Will:      Power of :    POLST:

## 2022-02-16 NOTE — ED PROVIDER NOTES
History  Chief Complaint   Patient presents with    Leg Pain     left posterior knee pain since Saturday  states it feels like a "charliehorse"    Foot Pain     b/l foot pain, describes as "plantar facititis" pain     Three 7year-old male presents with 3-4 days of right knee pain, also reports that he has had chronic pains in multiple joints including in his hands, ankles, hips, however he has been acutely unable to walk and has required crutches due to severe pain in the right knee with any weight-bearing  Patient denies any headache, dizziness, cough, chest pain, shortness of breath, nausea, vomiting, abdominal pain, dysuria, hematuria, constipation, diarrhea, denies any recent swelling in a any of the joints, patient denies any fever or recent trauma  Patient previously has been seen by his primary care doctor and diagnosed with nonspecific arthritis, patient's children have inherited mitochondrial and collagen disorders  Prior to Admission Medications   Prescriptions Last Dose Informant Patient Reported? Taking?    albuterol (PROVENTIL HFA,VENTOLIN HFA) 90 mcg/act inhaler More than a month at Unknown time  No No   Sig: INHALE 2 PUFFS BY MOUTH EVERY 6 HOURS AS NEEDED FOR WHEEZING   amLODIPine (NORVASC) 10 mg tablet 2022 at Unknown time  No Yes   Sig: TAKE 1 TABLET(10 MG) BY MOUTH DAILY   aspirin (ECOTRIN LOW STRENGTH) 81 mg EC tablet 2/15/2022 at Unknown time  No Yes   Sig: Take 1 tablet (81 mg total) by mouth daily   fluticasone (FLONASE) 50 mcg/act nasal spray Not Taking at Unknown time  No No   Si sprays into each nostril daily   Patient not taking: Reported on 2022    hydrochlorothiazide (HYDRODIURIL) 25 mg tablet 2022 at Unknown time  No Yes   Sig: TAKE 2 TABLETS(50 MG) BY MOUTH DAILY   montelukast (SINGULAIR) 10 mg tablet 2022 at Unknown time  No Yes   Sig: TAKE 1 TABLET(10 MG) BY MOUTH DAILY AT BEDTIME   naproxen (NAPROSYN) 500 mg tablet Not Taking at Unknown time  No No   Sig: TAKE 1 TABLET(500 MG) BY MOUTH TWICE DAILY AS NEEDED FOR MODERATE PAIN   Patient not taking: Reported on 2/16/2022      Facility-Administered Medications: None       Past Medical History:   Diagnosis Date    Ankle swelling     Arthritis     Asthma     Dizziness     Heartburn     Hypertension     Increased frequency of urination     Intolerance to heat     Joint pain     and joint swelling    Morbid obesity with BMI of 50 0-59 9, adult ([facility] [address] ) 12/16/2019    Sleep apnea     SOB (shortness of breath)        History reviewed  No pertinent surgical history  Family History   Problem Relation Age of Onset    Hypertension Mother     Throat cancer Father     No Known Problems Sister     No Known Problems Sister     Hypertension Sister     Diabetes Neg Hx     Heart disease Neg Hx     Stroke Neg Hx     Thyroid disease Neg Hx      I have reviewed and agree with the history as documented  E-Cigarette/Vaping    E-Cigarette Use Never User      E-Cigarette/Vaping Substances     Social History     Tobacco Use    Smoking status: Never Smoker    Smokeless tobacco: Never Used   Vaping Use    Vaping Use: Never used   Substance Use Topics    Alcohol use: Never    Drug use: No       Review of Systems   Constitutional: Negative for fever  HENT: Negative for congestion  Eyes: Negative for visual disturbance  Respiratory: Negative for cough and shortness of breath  Cardiovascular: Negative for chest pain  Gastrointestinal: Negative for abdominal pain, diarrhea and vomiting  Endocrine: Negative for polyuria  Genitourinary: Negative for dysuria and hematuria  Musculoskeletal: Positive for arthralgias and myalgias  Neurological: Negative for dizziness and headaches  Physical Exam  Physical Exam  Constitutional:       Appearance: Normal appearance  HENT:      Head: Normocephalic and atraumatic        Right Ear: External ear normal       Left Ear: External ear normal  Mouth/Throat:      Mouth: Mucous membranes are moist       Pharynx: Oropharynx is clear  Eyes:      Conjunctiva/sclera: Conjunctivae normal       Pupils: Pupils are equal, round, and reactive to light  Cardiovascular:      Rate and Rhythm: Regular rhythm  Tachycardia present  Heart sounds: Normal heart sounds  Pulmonary:      Breath sounds: Normal breath sounds  Abdominal:      General: Abdomen is flat  There is no distension  Palpations: Abdomen is soft  Musculoskeletal:         General: Tenderness present  No swelling or deformity  Normal range of motion  Cervical back: Normal range of motion  Comments: Tenderness to palpation in multiple joints, no significant signs of erythema, fluctuance, swelling, or deformity  Skin:     General: Skin is warm and dry  Neurological:      General: No focal deficit present  Mental Status: He is alert and oriented to person, place, and time     Psychiatric:         Mood and Affect: Mood normal          Behavior: Behavior normal          Vital Signs  ED Triage Vitals   Temperature Pulse Respirations Blood Pressure SpO2   02/16/22 1115 02/16/22 1115 02/16/22 1115 02/16/22 1115 02/16/22 1115   100 3 °F (37 9 °C) (!) 138 18 144/99 99 %      Temp Source Heart Rate Source Patient Position - Orthostatic VS BP Location FiO2 (%)   02/16/22 1115 02/16/22 1203 02/16/22 1203 02/16/22 1203 02/16/22 2038   Oral Monitor Lying Right arm 21      Pain Score       02/16/22 1115       4           Vitals:    02/17/22 0100 02/17/22 0300 02/17/22 0750 02/17/22 1115   BP: 143/84 149/96 131/84 136/89   Pulse: 97 (!) 106 86 (!) 107   Patient Position - Orthostatic VS: Lying Lying Lying Lying         Visual Acuity      ED Medications  Medications   acetaminophen (TYLENOL) tablet 650 mg (650 mg Oral Given 2/17/22 1009)   enoxaparin (LOVENOX) subcutaneous injection 40 mg (40 mg Subcutaneous Given 2/17/22 1010)   albuterol (PROVENTIL HFA,VENTOLIN HFA) inhaler 2 puff (has no administration in time range)   aspirin (ECOTRIN LOW STRENGTH) EC tablet 81 mg (81 mg Oral Given 2/17/22 1008)   fluticasone (FLONASE) 50 mcg/act nasal spray 2 spray (2 sprays Nasal Not Given 2/17/22 1010)   cefTRIAXone (ROCEPHIN) IVPB (premix in dextrose) 1,000 mg 50 mL (1,000 mg Intravenous New Bag 2/16/22 1811)   vancomycin (VANCOCIN) 1,250 mg in sodium chloride 0 9 % 250 mL IVPB (1,250 mg Intravenous New Bag 2/17/22 0050)   montelukast (SINGULAIR) tablet 10 mg (has no administration in time range)   amLODIPine (NORVASC) tablet 5 mg (5 mg Oral Given 2/17/22 1008)   losartan (COZAAR) tablet 25 mg (25 mg Oral Given 2/17/22 1008)   multi-electrolyte (PLASMALYTE-A/ISOLYTE-S PH 7 4) IV solution (75 mL/hr Intravenous New Bag 2/17/22 1009)   colchicine (COLCRYS) tablet 0 6 mg (has no administration in time range)   naproxen (NAPROSYN) tablet 250 mg (has no administration in time range)   cefepime (MAXIPIME) IVPB (premix in dextrose) 2,000 mg 50 mL (0 mg Intravenous Stopped 2/16/22 1325)   vancomycin (VANCOCIN) 1,750 mg in sodium chloride 0 9 % 500 mL IVPB (1,750 mg Intravenous New Bag 2/16/22 1319)   acetaminophen (TYLENOL) tablet 975 mg (975 mg Oral Given 2/16/22 1251)   ketorolac (TORADOL) injection 15 mg (15 mg Intravenous Given 2/16/22 1252)   sodium chloride 0 9 % bolus 1,750 mL (1,750 mL Intravenous New Bag 2/16/22 1318)   multi-electrolyte (ISOLYTE-S PH 7 4) bolus 1,000 mL (1,000 mL Intravenous New Bag 2/16/22 1811)   lidocaine 1% buffered (4 mL Infiltration Given 2/16/22 1614)   colchicine (COLCRYS) tablet 1 2 mg (1 2 mg Oral Given 2/17/22 1009)       Diagnostic Studies  Results Reviewed     Procedure Component Value Units Date/Time    TSH, 3rd generation [[id number]]  (Normal) Collected: 02/17/22 0617    Lab Status: Final result Specimen: Blood from Arm, Right Updated: 02/17/22 0752     TSH 3RD GENERATON 3 013 uIU/mL     Narrative:      Patients undergoing fluorescein dye angiography may retain small amounts of fluorescein in the body for 48-72 hours post procedure  Samples containing fluorescein can produce falsely depressed TSH values  If the patient had this procedure,a specimen should be resubmitted post fluorescein clearance        Basic metabolic panel [[id number]]  (Abnormal) Collected: 02/17/22 0602    Lab Status: Final result Specimen: Blood from Arm, Right Updated: 02/17/22 0729     Sodium 138 mmol/L      Potassium 3 2 mmol/L      Chloride 98 mmol/L      CO2 24 mmol/L      ANION GAP 16 mmol/L      BUN 16 mg/dL      Creatinine 1 18 mg/dL      Glucose 109 mg/dL      Calcium 9 5 mg/dL      eGFR 77 ml/min/1 73sq m     Narrative:      [facility] guidelines for Chronic Kidney Disease (CKD):     Stage 1 with normal or high GFR (GFR > 90 mL/min/1 73 square meters)    Stage 2 Mild CKD (GFR = 60-89 mL/min/1 73 square meters)    Stage 3A Moderate CKD (GFR = 45-59 mL/min/1 73 square meters)    Stage 3B Moderate CKD (GFR = 30-44 mL/min/1 73 square meters)    Stage 4 Severe CKD (GFR = 15-29 mL/min/1 73 square meters)    Stage 5 End Stage CKD (GFR <15 mL/min/1 73 square meters)  Note: GFR calculation is accurate only with a steady state creatinine    Magnesium [[id number]]  (Normal) Collected: 02/17/22 0602    Lab Status: Final result Specimen: Blood from Arm, Right Updated: 02/17/22 0729     Magnesium 2 1 mg/dL     Procalcitonin Reflex [[id number]]  (Abnormal) Collected: 02/17/22 0617    Lab Status: Final result Specimen: Blood from Arm, Right Updated: 02/17/22 0719     Procalcitonin 0 29 ng/ml     CBC and differential [[id number]]  (Abnormal) Collected: 02/17/22 0602    Lab Status: Final result Specimen: Blood from Arm, Right Updated: 02/17/22 0704     WBC 15 57 Thousand/uL      RBC 4 75 Million/uL      Hemoglobin 12 9 g/dL      Hematocrit 39 9 %      MCV 84 fL      MCH 27 2 pg      MCHC 32 3 g/dL      RDW 13 7 %      MPV 9 8 fL      Platelets 721 Thousands/uL      nRBC 0 /100 WBCs      Neutrophils Relative 75 %      Immat GRANS % 1 %      Lymphocytes Relative 14 %      Monocytes Relative 9 %      Eosinophils Relative 1 %      Basophils Relative 0 %      Neutrophils Absolute 11 63 Thousands/µL      Immature Grans Absolute 0 10 Thousand/uL      Lymphocytes Absolute 2 24 Thousands/µL      Monocytes Absolute 1 33 Thousand/µL      Eosinophils Absolute 0 21 Thousand/µL      Basophils Absolute 0 06 Thousands/µL     Uric acid [[id number]]  (Abnormal) Collected: 02/16/22 1223    Lab Status: Final result Specimen: Blood from Arm, Left Updated: 02/16/22 1728     Uric Acid 10 6 mg/dL     Sedimentation rate, automated [[id number]]  (Abnormal) Collected: 02/16/22 1223    Lab Status: Final result Specimen: Blood from Arm, Left Updated: 02/16/22 1619     Sed Rate 28 mm/hour     Blood culture #1 [[id number]] Collected: 02/16/22 1230    Lab Status: Preliminary result Specimen: Blood from Arm, Left Updated: 02/16/22 1605     Blood Culture Received in Microbiology Lab  Culture in Progress  Blood culture #2 [[id number]] Collected: 02/16/22 1223    Lab Status: Preliminary result Specimen: Blood from Arm, Left Updated: 02/16/22 1605     Blood Culture Received in Microbiology Lab  Culture in Progress      MRSA culture [[id number]]     Lab Status: No result Specimen: Nares     Multi Drug Resistant Organisms screen [[id number]]     Lab Status: No result Specimen: Stool     VRE screen, Rectal [[id number]]     Lab Status: No result Specimen: Stool     HS Troponin I 2hr [[id number]]  (Normal) Collected: 02/16/22 1426    Lab Status: Final result Specimen: Blood from Line Updated: 02/16/22 1455     hs TnI 2hr 6 ng/L      Delta 2hr hsTnI 1 ng/L     COVID/FLU/RSV - 2 hour TAT [[id number]]  (Normal) Collected: 02/16/22 1242    Lab Status: Final result Specimen: Nares from Nasopharyngeal Swab Updated: 02/16/22 1333     SARS-CoV-2 Negative     INFLUENZA A PCR Negative     INFLUENZA B PCR Negative     RSV PCR Negative    Narrative:      FOR PEDIATRIC PATIENTS - copy/paste COVID Guidelines URL to browser: [email or web address]    SARS-CoV-2 assay is a Nucleic Acid Amplification assay intended for the  qualitative detection of nucleic acid from SARS-CoV-2 in nasopharyngeal  swabs  Results are for the presumptive identification of SARS-CoV-2 RNA  Positive results are indicative of infection with SARS-CoV-2, the virus  causing COVID-19, but do not rule out bacterial infection or co-infection  with other viruses  Laboratories within the [place] and its  territories are required to report all positive results to the appropriate  public health authorities  Negative results do not preclude SARS-CoV-2  infection and should not be used as the sole basis for treatment or other  patient management decisions  Negative results must be combined with  clinical observations, patient history, and epidemiological information  This test has not been FDA cleared or approved  This test has been authorized by FDA under an Emergency Use Authorization  (EUA)  This test is only authorized for the duration of time the  declaration that circumstances exist justifying the authorization of the  emergency use of an in vitro diagnostic tests for detection of SARS-CoV-2  virus and/or diagnosis of COVID-19 infection under section 564(b)(1) of  the Act, 21 U  S C  830ARO-2(A)(9), unless the authorization is terminated  or revoked sooner  The test has been validated but independent review by FDA  and CLIA is pending  Test performed using [employer] Gene[employer]pert: This RT-PCR assay targets N2,  a region unique to SARS-CoV-2  A conserved region in the E-gene was chosen  for pan-Sarbecovirus detection which includes SARS-CoV-2      Lactic acid, plasma [[id number]]  (Abnormal) Collected: 02/16/22 1223    Lab Status: Final result Specimen: Blood from Arm, Left Updated: 02/16/22 1302     LACTIC ACID 2 3 mmol/L     Narrative:      Result may be elevated if tourniquet was used during collection      Procalcitonin with AM Reflex [[id number]]  (Abnormal) Collected: 02/16/22 1223    Lab Status: Final result Specimen: Blood from Arm, Left Updated: 02/16/22 1259     Procalcitonin 0 26 ng/ml     HS Troponin 0hr (reflex protocol) [[id number]]  (Normal) Collected: 02/16/22 1223    Lab Status: Final result Specimen: Blood from Arm, Left Updated: 02/16/22 1257     hs TnI 0hr 5 ng/L     C-reactive protein [[id number]]  (Abnormal) Collected: 02/16/22 1223    Lab Status: Final result Specimen: Blood from Arm, Left Updated: 02/16/22 1250     CRP 19 6 mg/dL     Comprehensive metabolic panel [[id number]]  (Abnormal) Collected: 02/16/22 1223    Lab Status: Final result Specimen: Blood from Arm, Left Updated: 02/16/22 1249     Sodium 137 mmol/L      Potassium 3 3 mmol/L      Chloride 98 mmol/L      CO2 26 mmol/L      ANION GAP 13 mmol/L      BUN 16 mg/dL      Creatinine 1 30 mg/dL      Glucose 117 mg/dL      Calcium 9 7 mg/dL      AST 15 U/L      ALT 20 U/L      Alkaline Phosphatase 51 0 U/L      Total Protein 9 8 g/dL      Albumin 4 2 g/dL      Total Bilirubin 0 57 mg/dL      eGFR 68 ml/min/1 73sq m     Narrative:      [place] guidelines for Chronic Kidney Disease (CKD):     Stage 1 with normal or high GFR (GFR > 90 mL/min/1 73 square meters)    Stage 2 Mild CKD (GFR = 60-89 mL/min/1 73 square meters)    Stage 3A Moderate CKD (GFR = 45-59 mL/min/1 73 square meters)    Stage 3B Moderate CKD (GFR = 30-44 mL/min/1 73 square meters)    Stage 4 Severe CKD (GFR = 15-29 mL/min/1 73 square meters)    Stage 5 End Stage CKD (GFR <15 mL/min/1 73 square meters)  Note: GFR calculation is accurate only with a steady state creatinine    Protime-INR [[id number]]  (Normal) Collected: 02/16/22 1223    Lab Status: Final result Specimen: Blood from Arm, Left Updated: 02/16/22 1242     Protime 13 8 seconds      INR 1 07    APTT [[id number]]  (Abnormal) Collected: 02/16/22 1223    Lab Status: Final result Specimen: Blood from Arm, Left Updated: 02/16/22 1242     PTT 41 seconds     CBC and differential [[id number]]  (Abnormal) Collected: 02/16/22 1223    Lab Status: Final result Specimen: Blood from Arm, Left Updated: 02/16/22 1233     WBC 19 25 Thousand/uL      RBC 5 31 Million/uL      Hemoglobin 14 0 g/dL      Hematocrit 43 5 %      MCV 82 fL      MCH 26 4 pg      MCHC 32 2 g/dL      RDW 13 8 %      MPV 10 0 fL      Platelets 179 Thousands/uL      nRBC 0 /100 WBCs      Neutrophils Relative 80 %      Immat GRANS % 1 %      Lymphocytes Relative 12 %      Monocytes Relative 6 %      Eosinophils Relative 1 %      Basophils Relative 0 %      Neutrophils Absolute 15 26 Thousands/µL      Immature Grans Absolute 0 19 Thousand/uL      Lymphocytes Absolute 2 39 Thousands/µL      Monocytes Absolute 1 24 Thousand/µL      Eosinophils Absolute 0 11 Thousand/µL      Basophils Absolute 0 06 Thousands/µL     UA w Reflex to Microscopic w Reflex to Culture [[id number]]     Lab Status: No result Specimen: Urine                  VAS lower limb venous duplex study, unilateral/limited   Final Result by Inge Gracia MD (02/16 1727)      XR chest portable   Final Result by Kizzy Pisano DO (02/16 1424)   No acute cardiopulmonary disease  Workstation performed: [id number]         XR knee 4+ vw right injury   Final Result by Kizzy Pisano DO (02/16 1431)   Minimal degenerative change  Small Suprapatellar joint effusion              Workstation performed: [id number]         IR aspiration joint (specify location)    (Results Pending)              Procedures  ECG 12 Lead Documentation Only    Date/Time: 2/16/2022 2:25 PM  Performed by: Domi Stroud MD  Authorized by: Domi Stroud MD     ECG reviewed by me, the ED Provider: yes    Patient location:  ED  Interpretation:     Interpretation: abnormal    Rate:     ECG rate:  129    ECG rate assessment: tachycardic    Rhythm:     Rhythm: sinus tachycardia    QRS:     QRS axis:  Normal  Conduction:     Conduction: normal    ST segments:     ST segments:  Normal  T waves:     T waves: normal               ED Course       MDM  Number of Diagnoses or Management Options  Diagnosis management comments: Patient is febrile and tachycardic here, concern for possible septic arthritis or other source of sepsis  Patient be started on antibiotics, imaging will be obtained, patient will be admitted for further workup  Disposition  Final diagnoses:   Acute sepsis ([place] [address] )   Polyarthritis     Time reflects when diagnosis was documented in both MDM as applicable and the Disposition within this note     Time User Action Codes Description Comment    2/16/2022  2:40 PM [place] Lemuel Add [A41 9] Acute sepsis ([facility] )     2/16/2022  2:41 PM [place] Lemuel Add [M13 0] Polyarthritis     2/16/2022  3:30 PM Vito Wapella Add [M00 9] Sepsis likely secondary to Pyogenic arthritis of right knee joint ([place] [address] )     2/17/2022 10:56 AM Vito Wapella Modify [A41 9] Acute sepsis ([place] [address] )     2/17/2022 10:56 AM Vito Michael Modify [M13 0] Polyarthritis     2/17/2022 10:56 AM Vito Michael Modify [M00 9] Sepsis likely secondary to Pyogenic arthritis of right knee joint [facility])       ED Disposition     ED Disposition Condition Date/Time Comment    Admit Stable Wed Feb 16, 2022  2:40 PM Case was discussed with Dr Merlene Mckeon and the patient's admission status was agreed to be Admission Status: inpatient status to the service of Dr Merlene Mckeon          Follow-up Information    None         Current Discharge Medication List      CONTINUE these medications which have NOT CHANGED    Details   amLODIPine (NORVASC) 10 mg tablet TAKE 1 TABLET(10 MG) BY MOUTH DAILY  Qty: 30 tablet, Refills: 0    Associated Diagnoses: Essential hypertension      aspirin (ECOTRIN LOW STRENGTH) 81 mg EC tablet Take 1 tablet (81 mg total) by mouth daily  Qty: 90 tablet, Refills: 1    Associated Diagnoses: Obesity, Class III, BMI 40-49 9 (morbid obesity) ([facility])      hydrochlorothiazide (HYDRODIURIL) 25 mg tablet TAKE 2 TABLETS(50 MG) BY MOUTH DAILY  Qty: 30 tablet, Refills: 6    Associated Diagnoses: Essential hypertension      montelukast (SINGULAIR) 10 mg tablet TAKE 1 TABLET(10 MG) BY MOUTH DAILY AT BEDTIME  Qty: 30 tablet, Refills: 0    Associated Diagnoses: Allergic rhinitis, unspecified seasonality, unspecified trigger      albuterol (PROVENTIL HFA,VENTOLIN HFA) 90 mcg/act inhaler INHALE 2 PUFFS BY MOUTH EVERY 6 HOURS AS NEEDED FOR WHEEZING  Qty: 8 5 g, Refills: 0    Associated Diagnoses: Wheezing      fluticasone (FLONASE) 50 mcg/act nasal spray 2 sprays into each nostril daily  Qty: 1 Bottle, Refills: 11    Associated Diagnoses: Allergic rhinitis, unspecified seasonality, unspecified trigger      naproxen (NAPROSYN) 500 mg tablet TAKE 1 TABLET(500 MG) BY MOUTH TWICE DAILY AS NEEDED FOR MODERATE PAIN  Qty: 60 tablet, Refills: 2    Associated Diagnoses: Chronic pain of both ankles             No discharge procedures on file      PDMP Review     None          ED Provider  Electronically Signed by           Kang Stoner MD  02/17/22 8190

## 2022-02-16 NOTE — ASSESSMENT & PLAN NOTE
-patient reports multiple joint pain that has been there for years especially on the uncles knee of risk and elbows  -reports that for the last 4 days the right knee has been a little bit more swollen than usual, extremely tender and it has increased temperature  No erythema  -denies any recent trauma  -no history of , septic arthritis, hemochromatosis, Lyme disease, tick bite, diabetes neuropathy, trauma, connective tissue disease, SLE, RA, vasculitis, no recent vital infection, no sick contacts  he never follow with Rheumatology for these multiple joint pain in the last few years  -patient does meet sepsis criteria with elevated white blood cell count, leukocytosis, tachycardia, elevated lactic acid  -patient is sexually active with the wife, denies any signs of  STD  -no history of alcohol or drug use   -no urinary complaints  -elevated CRP  -elevated lactic acid  -x-ray of the right knee unrevealing  Does have degenerative changes and supra patellar joint effusion    Plan  -continue IV antibiotics with vancomycin /ceftriaxone  -we will do right knee arthrocentesis on fluid analysis  -follow uric acid  -follow blood cultures  -continue monitor lactic acid  -continue fluid hydration as per sepsis protocol  -follow urinalysis  -continue trending CBC  -continue monitoring vital signs  -follow ESR  -recommended outpatient consult with Rheumatology  -orthopedic consulted for arthrocentesis

## 2022-02-16 NOTE — BRIEF OP NOTE (RAD/CATH)
INTERVENTIONAL RADIOLOGY PROCEDURE NOTE    Date: 2/16/2022    Procedure:      Preoperative diagnosis:   1  Acute sepsis ([address] )    2  Polyarthritis    3  Sepsis likely secondary to Pyogenic arthritis of right knee joint (HCC)         Postoperative diagnosis: Same  Surgeon: Mauro Stanley MD     Assistant: None  No qualified resident was available  Blood loss:  Minimal    Specimens:  2 mL of serosanguineous right knee joint fluid aspirated and submitted for studies  Findings:   Sonographic evaluation of the right knee demonstrated small effusion  There was significantly thickened hypoechoic tissue in the region of the suprapatellar bursa, which may represent inflammatory tissue/synovitis  Needle was advanced to the patellofemoral articulation and 2 mL of serosanguineous fluid was aspirated and sent for studies  Complications: None immediate      Anesthesia: local

## 2022-02-16 NOTE — PROGRESS NOTES
Vancomycin Assessment    Pieter Bolanos is a 46 y o  male who is currently receiving vancomycin 1250mg IV q12hrs for skin-soft tissue infection     Relevant clinical data and objective history reviewed:  Creatinine   Date Value Ref Range Status   02/16/2022 1 30 (H) 0 50 - 1 20 mg/dL Final     Comment:     Standardized to IDMS reference method   08/20/2016 1 18 0 60 - 1 30 mg/dL Final     Comment:     Standardized to IDMS reference method     /79 (BP Location: Right arm)   Pulse (!) 111   Temp 99 9 °F (37 7 °C) (Tympanic)   Resp 22   Ht 5' 1" (1 549 m)   Wt 118 kg (259 lb 11 2 oz)   SpO2 98%   BMI 49 07 kg/m²   No intake/output data recorded  Lab Results   Component Value Date/Time    BUN 16 02/16/2022 12:23 PM    WBC 19 25 (H) 02/16/2022 12:23 PM    HGB 14 0 02/16/2022 12:23 PM    HCT 43 5 02/16/2022 12:23 PM    MCV 82 02/16/2022 12:23 PM     (H) 02/16/2022 12:23 PM     Temp Readings from Last 3 Encounters:   02/16/22 99 9 °F (37 7 °C) (Tympanic)   05/24/21 98 2 °F (36 8 °C) (Temporal)   03/01/21 99 4 °F (37 4 °C)     Vancomycin Days of Therapy: 1    Assessment/Plan  The patient is currently on vancomycin utilizing scheduled dosing  Baseline risks associated with therapy include: concomitant nephrotoxic medications  The patient is receiving 1250mg IV q12hrs with the most recent vancomycin level being not at steady-state and sub-therapeutic based on a goal of 10-15 (mild infection/cellulitis) ; therefore, is clinically appropriate and dose will be continued   Pharmacy will continue to follow closely for s/sx of nephrotoxicity, infusion reactions, and appropriateness of therapy  BMP and CBC will be ordered per protocol  Plan for trough as patient approaches steady state, prior to the 5th  dose at approximately 1200 on 02/18/22  Pharmacy will continue to follow the patients culture results and clinical progress daily      Pham Courser, Pharmacist

## 2022-02-16 NOTE — TELEMEDICINE
e-Consult (IPC)  - Interventional Radiology  Pieter Beckham 46 y o  male MRN: [id number]  Unit/Bed#: -01 Encounter: [id number]    IR has been consulted to evaluate the patient, determine the appropriate procedure, and whether or not a procedure can and should be performed regarding the care of Pieter Beckham  IP Consult to IR  Consult performed by: Rich Villareal MD  Consult ordered by: Claribel Leigh MD        02/16/22      Assessment/Recommendation: This is a 72-year-old male with history of chronic polyarthralgia who presented with acute worsening of chronic right knee pain  Radiograph showed possible right knee effusion and there was initial clinical concern for septic arthritis  Interventional radiology has been consulted for image guided right knee arthrocentesis  Procedure is planned today with ultrasound guidance and local anesthesia  Total time spent in review of data, discussion with requesting provider and rendering advice was 10 minutes       Patient or appropriate family member was verbally informed by Dr Zeke Naranjo of this consultative service on their behalf to provide more timely access to specialty care in lieu of an in person consultation  Verbal consent was obtained  Thank you for allowing Interventional Radiology to participate in the care of [address] [facility] [address]  Please don't hesitate to call or TigerText us with any questions       Rich Villareal MD

## 2022-02-16 NOTE — H&P
Sean 47  H&P- Pieter Mercado 1980, 46 y o  male MRN: [id number]  Unit/Bed#: -[place] Encounter: [id number]  Primary Care Provider: KATARINA Benavidez   Date and time admitted to hospital: 2/16/2022 11:19 AM    * Sepsis likely secondary to Pyogenic arthritis of right knee joint ([facility] )  Assessment & Plan  -patient reports multiple joint pain that has been there for years especially on the uncles knee of risk and elbows  -reports that for the last 4 days the right knee has been a little bit more swollen than usual, extremely tender and it has increased temperature  No erythema  -denies any recent trauma  -no history of , septic arthritis, hemochromatosis, Lyme disease, tick bite, diabetes neuropathy, trauma, connective tissue disease, SLE, RA, vasculitis, no recent vital infection, no sick contacts  he never follow with Rheumatology for these multiple joint pain in the last few years  -patient does meet sepsis criteria with elevated white blood cell count, leukocytosis, tachycardia, elevated lactic acid  -patient is sexually active with the wife, denies any signs of  STD  -no history of alcohol or drug use   -no urinary complaints  -elevated CRP  -elevated lactic acid  -x-ray of the right knee unrevealing  Does have degenerative changes and supra patellar joint effusion    Plan  -continue IV antibiotics with vancomycin /ceftriaxone  -we will do right knee arthrocentesis on fluid analysis  -follow uric acid  -follow blood cultures  -continue monitor lactic acid  -continue fluid hydration as per sepsis protocol  -follow urinalysis  -continue trending CBC  -continue monitoring vital signs  -follow ESR  -recommended outpatient consult with Rheumatology  -orthopedic consulted for arthrocentesis      Obesity, Class III, BMI 40-49 9 (morbid obesity) ([facility] )  Assessment & Plan  -patient is being evaluated for bariatric surgery    EVE (obstructive sleep apnea)  Assessment & Plan  -severe sleep apnea on BiPAP 25/20 at nighttime    Mild intermittent asthma without complication  Assessment & Plan  -currently no exacerbation will control home Singulair ease and albuterol p r n  Essential hypertension  Assessment & Plan  -blood pressure mildly elevated likely secondary to pain  -continue on amlodipine 10 mg  -will hold hydrochlorothiazide a in the view of suspicion having joint pain that could be related to gout  Come be restarted once uric acid is between normal limits   -if needed patient did have metoprolol    Emergency Contacts: Extended Emergency Contact Information  Primary Emergency Contact: Gary,Mindy  Address: [address] [place] [address],[address], [place] [id number] [place] [address] Phone: [phone]  Mobile Phone: [phone]  Relation: Spouse      VTE Prophylaxis: Enoxaparin (Lovenox)  / sequential compression device   Code Status:  Level 1  POLST: There is no POLST form on file for this patient (pre-hospital)    Discussion with family:  Wife at bedside    Anticipated Length of Stay:  Patient will be admitted on an Inpatient basis with an anticipated length of stay of   2 midnights  Justification for Hospital Stay:  Right knee pain    Total Time for Visit, including Counseling / Coordination of Care: 30 minutes  Greater than 50% of this total time spent on direct patient counseling and coordination of care  Chief Complaint:   Right knee pain    History of Present Illness: Anjana Nova is a 46 y o  male with past medical history of EVE, asthma, hypertension, obesity, history of joint pains who presents with acute change in right knee pain  Patient reports that he has been having joint pains for multiple years now but for the last 4 days his right knee has been bothering him more than usual   He is unable to step on the right side because of pain, a decrease in his range of motion, is extremely tender with increased temperature but no erythema  He reports no trauma  Patient was never evaluated by Rheumatology but he does not have history of vasculitis, connective tissue disorder, hemochromatosis, gout, diabetes, Lyme disease, STDs that could explain the situation joint pains  Denies any history of lupus, rheumatoid arthritis in the family or himself  The size of the joint pain patient denies any chest pain, shortness of breath, recent vital infection, sick contacts, abdominal pain, diarrhea, constipation, nausea, vomiting, upper respiratory symptoms, signs of STDs  He is sexually active with his wife  On the ED patient was found to be septic IV fluids and antibiotics were started  Patient will be admitted for further rule out possible septic arthritis/bursitis of the right knee  Review of Systems:    Review of Systems   Constitutional: Negative for diaphoresis, fatigue and fever  HENT: Negative for congestion  Eyes: Negative for visual disturbance  Respiratory: Negative for cough and shortness of breath  Cardiovascular: Negative for chest pain, palpitations and leg swelling  Gastrointestinal: Negative for constipation, diarrhea, nausea and vomiting  Endocrine: Negative for polydipsia, polyphagia and polyuria  Genitourinary: Negative for dysuria  Musculoskeletal: Positive for arthralgias, gait problem and joint swelling  Negative for back pain, myalgias, neck pain and neck stiffness  Skin: Negative for color change  Neurological: Negative for light-headedness and headaches  Psychiatric/Behavioral: Negative for confusion  Past Medical and Surgical History:     Past Medical History:   Diagnosis Date    Ankle swelling     Arthritis     Asthma     Dizziness     Heartburn     Hypertension     Increased frequency of urination     Intolerance to heat     Joint pain     and joint swelling    Morbid obesity with BMI of 50 0-59 9, adult ([facility] ) 12/16/2019    Sleep apnea     SOB (shortness of breath)        History reviewed   No pertinent surgical history  Meds/Allergies:    Prior to Admission medications    Medication Sig Start Date End Date Taking? Authorizing Provider   amLODIPine (NORVASC) 10 mg tablet TAKE 1 TABLET(10 MG) BY MOUTH DAILY 5/24/21  Yes KATARINA Billingsley   aspirin (ECOTRIN LOW STRENGTH) 81 mg EC tablet Take 1 tablet (81 mg total) by mouth daily 5/24/21  Yes KATARINA Billingsley   hydrochlorothiazide (HYDRODIURIL) 25 mg tablet TAKE 2 TABLETS(50 MG) BY MOUTH DAILY 6/3/21  Yes KATARINA Esposito   montelukast (SINGULAIR) 10 mg tablet TAKE 1 TABLET(10 MG) BY MOUTH DAILY AT BEDTIME 5/24/21  Yes KATARINA Billingsley   albuterol (PROVENTIL HFA,VENTOLIN HFA) 90 mcg/act inhaler INHALE 2 PUFFS BY MOUTH EVERY 6 HOURS AS NEEDED FOR WHEEZING 11/10/21   KATARINA Billingsley   fluticasone (FLONASE) 50 mcg/act nasal spray 2 sprays into each nostril daily  Patient not taking: Reported on 2/16/2022 4/22/20   Donzella Jeans, PA-C   naproxen (NAPROSYN) 500 mg tablet TAKE 1 TABLET(500 MG) BY MOUTH TWICE DAILY AS NEEDED FOR MODERATE PAIN  Patient not taking: Reported on 2/16/2022 11/8/21   KATARINA Billingsley   amLODIPine (NORVASC) 10 mg tablet TAKE 1 TABLET(10 MG) BY MOUTH DAILY 11/16/21 2/16/22  KATARINA Billingsley   montelukast (SINGULAIR) 10 mg tablet TAKE 1 TABLET(10 MG) BY MOUTH DAILY AT BEDTIME 11/16/21 2/16/22  KATARINA Billingsley     I have reviewed home medications with patient personally      Allergies: No Known Allergies    Social History:     Marital Status: /Civil Union   Occupation:  [employer]   Patient Pre-hospital Living Situation:  Lives with the wife and 2 kids  Patient Pre-hospital Level of Mobility:  Independent  Patient Pre-hospital Diet Restrictions:  None  Substance Use History:   Social History     Substance and Sexual Activity   Alcohol Use Never     Social History     Tobacco Use   Smoking Status Never Smoker   Smokeless Tobacco Never Used     Social History     Substance and Sexual Activity   Drug Use No       Family History:    Family History   Problem Relation Age of Onset    Hypertension Mother     Throat cancer Father     No Known Problems Sister     No Known Problems Sister     Hypertension Sister     Diabetes Neg Hx     Heart disease Neg Hx     Stroke Neg Hx     Thyroid disease Neg Hx         Physical Exam:     Vitals:   Blood Pressure: 135/79 (02/16/22 1520)  Pulse: (!) 111 (02/16/22 1520)  Temperature: 99 9 °F (37 7 °C) (02/16/22 1520)  Temp Source: Tympanic (02/16/22 1520)  Respirations: 22 (02/16/22 1520)  Height: 5' 1" (154 9 cm) (02/16/22 1520)  Weight - Scale: 118 kg (259 lb 11 2 oz) (02/16/22 1520)  SpO2: 98 % (02/16/22 1520)    Physical Exam  Constitutional:       General: He is not in acute distress  Appearance: He is obese  HENT:      Head: Normocephalic and atraumatic  Cardiovascular:      Rate and Rhythm: Normal rate and regular rhythm  Pulses: Normal pulses  Heart sounds: Normal heart sounds  Pulmonary:      Effort: Pulmonary effort is normal       Breath sounds: Normal breath sounds  Abdominal:      General: Abdomen is flat  Bowel sounds are normal       Palpations: Abdomen is soft  Musculoskeletal:      Cervical back: Normal range of motion  Comments: Decreased range of motion on the right knee due to pain, tenderness and increased temperature  No erythema  Decreased range of motion at the ankles and risk also secondary to pain but no edema erythema or tenderness  Skin:     General: Skin is warm  Capillary Refill: Capillary refill takes less than 2 seconds  Neurological:      General: No focal deficit present  Mental Status: He is alert and oriented to person, place, and time  Mental status is at baseline  Psychiatric:         Mood and Affect: Mood normal        Additional Data:     Lab Results: I have personally reviewed pertinent reports        Results from last 7 days   Lab Units 02/16/22  1223   WBC Thousand/uL 19 25*   HEMOGLOBIN g/dL 14 0 HEMATOCRIT % 43 5   PLATELETS Thousands/uL 480*   NEUTROS PCT % 80*   LYMPHS PCT % 12*   MONOS PCT % 6   EOS PCT % 1     Results from last 7 days   Lab Units 02/16/22  1223   SODIUM mmol/L 137   POTASSIUM mmol/L 3 3*   CHLORIDE mmol/L 98   CO2 mmol/L 26   BUN mg/dL 16   CREATININE mg/dL 1 30*   ANION GAP mmol/L 13   CALCIUM mg/dL 9 7   ALBUMIN g/dL 4 2   TOTAL BILIRUBIN mg/dL 0 57   ALK PHOS U/L 51 0   ALT U/L 20   AST U/L 15   GLUCOSE RANDOM mg/dL 117     Results from last 7 days   Lab Units 02/16/22  1223   INR  1 07             Results from last 7 days   Lab Units 02/16/22  1223   LACTIC ACID mmol/L 2 3*   PROCALCITONIN ng/ml 0 26*       Imaging: I have personally reviewed pertinent reports  XR chest portable   Final Result by Nora Shelley DO (02/16 1424)   No acute cardiopulmonary disease  Workstation performed: [id number]         XR knee 4+ vw right injury   Final Result by Nora Shelley DO (02/16 1431)   Minimal degenerative change  Small Suprapatellar joint effusion  Workstation performed: [id number]         VAS lower limb venous duplex study, unilateral/limited    (Results Pending)     ·     Allscripts / Epic Records Reviewed: Yes     ** Please Note: This note has been constructed using a voice recognition system   **

## 2022-02-16 NOTE — SEDATION DOCUMENTATION
2cc's of serosanguinous fluid removed from right knee aspirate  Patient tolerated well  Specimen sent to lab

## 2022-02-16 NOTE — PLAN OF CARE
Problem: INFECTION - ADULT  Goal: Absence or prevention of progression during hospitalization  Description: INTERVENTIONS:  - Assess and monitor for signs and symptoms of infection  - Monitor lab/diagnostic results  - Monitor all insertion sites, i e  indwelling lines, tubes, and drains  - Monitor endotracheal if appropriate and nasal secretions for changes in amount and color  - [place] appropriate cooling/warming therapies per order  - Administer medications as ordered  - Instruct and encourage patient and family to use good hand hygiene technique  - Identify and instruct in appropriate isolation precautions for identified infection/condition  Outcome: Progressing  Goal: Absence of fever/infection during neutropenic period  Description: INTERVENTIONS:  - Monitor WBC    Outcome: Progressing    Admitted pt to floor from ER> Pt is aaox4 and unable to ambulate properly due to pain in right knee  Pt denied pain medication when offered  Pt oriented to room and all policies  Pt is resting in bed with call bell in reach, and bed is locked and in the lowest position

## 2022-02-17 ENCOUNTER — TELEPHONE (OUTPATIENT)
Dept: SLEEP CENTER | Facility: CLINIC | Age: 42
End: 2022-02-17

## 2022-02-17 ENCOUNTER — TELEPHONE (OUTPATIENT)
Dept: RHEUMATOLOGY | Facility: CLINIC | Age: 42
End: 2022-02-17

## 2022-02-17 ENCOUNTER — APPOINTMENT (INPATIENT)
Dept: RADIOLOGY | Facility: HOSPITAL | Age: 42
DRG: 351 | End: 2022-02-17
Payer: MEDICARE

## 2022-02-17 PROBLEM — R50.9 FEVER: Status: ACTIVE | Noted: 2022-02-17

## 2022-02-17 PROBLEM — R65.10 SIRS (SYSTEMIC INFLAMMATORY RESPONSE SYNDROME) (HCC): Status: ACTIVE | Noted: 2022-02-16

## 2022-02-17 LAB
ANION GAP SERPL CALCULATED.3IONS-SCNC: 16 MMOL/L (ref 4–13)
BACTERIA UR QL AUTO: NORMAL /HPF
BASOPHILS # BLD AUTO: 0.06 THOUSANDS/ΜL (ref 0–0.1)
BASOPHILS NFR BLD AUTO: 0 % (ref 0–1)
BILIRUB UR QL STRIP: NEGATIVE
BUN SERPL-MCNC: 16 MG/DL (ref 6–20)
CALCIUM SERPL-MCNC: 9.5 MG/DL (ref 8.4–10.2)
CHLORIDE SERPL-SCNC: 98 MMOL/L (ref 96–108)
CLARITY UR: CLEAR
CO2 SERPL-SCNC: 24 MMOL/L (ref 22–33)
COLOR UR: YELLOW
CREAT SERPL-MCNC: 1.18 MG/DL (ref 0.5–1.2)
CRYSTALS SNV QL MICRO: NORMAL
EOSINOPHIL # BLD AUTO: 0.21 THOUSAND/ΜL (ref 0–0.61)
EOSINOPHIL NFR BLD AUTO: 1 % (ref 0–6)
ERYTHROCYTE [DISTWIDTH] IN BLOOD BY AUTOMATED COUNT: 13.7 % (ref 11.6–15.1)
GFR SERPL CREATININE-BSD FRML MDRD: 77 ML/MIN/1.73SQ M
GLUCOSE SERPL-MCNC: 109 MG/DL (ref 65–140)
GLUCOSE UR STRIP-MCNC: NEGATIVE MG/DL
HCT VFR BLD AUTO: 39.9 % (ref 36.5–49.3)
HGB BLD-MCNC: 12.9 G/DL (ref 12–17)
HGB UR QL STRIP.AUTO: NEGATIVE
IMM GRANULOCYTES # BLD AUTO: 0.1 THOUSAND/UL (ref 0–0.2)
IMM GRANULOCYTES NFR BLD AUTO: 1 % (ref 0–2)
KETONES UR STRIP-MCNC: NEGATIVE MG/DL
LACTATE SERPL-SCNC: 1 MMOL/L (ref 0–2)
LACTATE SERPL-SCNC: 3 MMOL/L (ref 0–2)
LACTATE SERPL-SCNC: 3 MMOL/L (ref 0–2)
LEUKOCYTE ESTERASE UR QL STRIP: NEGATIVE
LYMPHOCYTES # BLD AUTO: 2.24 THOUSANDS/ΜL (ref 0.6–4.47)
LYMPHOCYTES NFR BLD AUTO: 14 % (ref 14–44)
MAGNESIUM SERPL-MCNC: 2.1 MG/DL (ref 1.6–2.6)
MCH RBC QN AUTO: 27.2 PG (ref 26.8–34.3)
MCHC RBC AUTO-ENTMCNC: 32.3 G/DL (ref 31.4–37.4)
MCV RBC AUTO: 84 FL (ref 82–98)
MONOCYTES # BLD AUTO: 1.33 THOUSAND/ΜL (ref 0.17–1.22)
MONOCYTES NFR BLD AUTO: 9 % (ref 4–12)
NEUTROPHILS # BLD AUTO: 11.63 THOUSANDS/ΜL (ref 1.85–7.62)
NEUTS SEG NFR BLD AUTO: 75 % (ref 43–75)
NITRITE UR QL STRIP: NEGATIVE
NON-SQ EPI CELLS URNS QL MICRO: NORMAL /HPF
NRBC BLD AUTO-RTO: 0 /100 WBCS
PH UR STRIP.AUTO: 6.5 [PH]
PLATELET # BLD AUTO: 472 THOUSANDS/UL (ref 149–390)
PMV BLD AUTO: 9.8 FL (ref 8.9–12.7)
POTASSIUM SERPL-SCNC: 3.2 MMOL/L (ref 3.5–5)
PROCALCITONIN SERPL-MCNC: 0.29 NG/ML
PROT UR STRIP-MCNC: ABNORMAL MG/DL
RBC # BLD AUTO: 4.75 MILLION/UL (ref 3.88–5.62)
RBC #/AREA URNS AUTO: NORMAL /HPF
SODIUM SERPL-SCNC: 138 MMOL/L (ref 133–145)
SP GR UR STRIP.AUTO: 1.02 (ref 1–1.03)
TSH SERPL DL<=0.05 MIU/L-ACNC: 3.01 UIU/ML (ref 0.34–5.6)
UROBILINOGEN UR QL STRIP.AUTO: 0.2 E.U./DL
WBC # BLD AUTO: 15.57 THOUSAND/UL (ref 4.31–10.16)
WBC #/AREA URNS AUTO: NORMAL /HPF

## 2022-02-17 PROCEDURE — 81001 URINALYSIS AUTO W/SCOPE: CPT | Performed by: EMERGENCY MEDICINE

## 2022-02-17 PROCEDURE — 84443 ASSAY THYROID STIM HORMONE: CPT | Performed by: INTERNAL MEDICINE

## 2022-02-17 PROCEDURE — 87040 BLOOD CULTURE FOR BACTERIA: CPT

## 2022-02-17 PROCEDURE — 85025 COMPLETE CBC W/AUTO DIFF WBC: CPT | Performed by: INTERNAL MEDICINE

## 2022-02-17 PROCEDURE — 84145 PROCALCITONIN (PCT): CPT | Performed by: EMERGENCY MEDICINE

## 2022-02-17 PROCEDURE — 94760 N-INVAS EAR/PLS OXIMETRY 1: CPT

## 2022-02-17 PROCEDURE — 80048 BASIC METABOLIC PNL TOTAL CA: CPT | Performed by: INTERNAL MEDICINE

## 2022-02-17 PROCEDURE — 71045 X-RAY EXAM CHEST 1 VIEW: CPT

## 2022-02-17 PROCEDURE — 99232 SBSQ HOSP IP/OBS MODERATE 35: CPT | Performed by: INTERNAL MEDICINE

## 2022-02-17 PROCEDURE — 89060 EXAM SYNOVIAL FLUID CRYSTALS: CPT | Performed by: INTERNAL MEDICINE

## 2022-02-17 PROCEDURE — 83605 ASSAY OF LACTIC ACID: CPT

## 2022-02-17 PROCEDURE — 83735 ASSAY OF MAGNESIUM: CPT | Performed by: INTERNAL MEDICINE

## 2022-02-17 PROCEDURE — 81003 URINALYSIS AUTO W/O SCOPE: CPT | Performed by: EMERGENCY MEDICINE

## 2022-02-17 PROCEDURE — 94660 CPAP INITIATION&MGMT: CPT

## 2022-02-17 PROCEDURE — 83605 ASSAY OF LACTIC ACID: CPT | Performed by: INTERNAL MEDICINE

## 2022-02-17 RX ORDER — SODIUM CHLORIDE, SODIUM GLUCONATE, SODIUM ACETATE, POTASSIUM CHLORIDE, MAGNESIUM CHLORIDE, SODIUM PHOSPHATE, DIBASIC, AND POTASSIUM PHOSPHATE .53; .5; .37; .037; .03; .012; .00082 G/100ML; G/100ML; G/100ML; G/100ML; G/100ML; G/100ML; G/100ML
75 INJECTION, SOLUTION INTRAVENOUS CONTINUOUS
Status: DISCONTINUED | OUTPATIENT
Start: 2022-02-17 | End: 2022-02-18 | Stop reason: HOSPADM

## 2022-02-17 RX ORDER — ASPIRIN 81 MG/1
81 TABLET ORAL DAILY
Status: DISCONTINUED | OUTPATIENT
Start: 2022-02-17 | End: 2022-02-18 | Stop reason: HOSPADM

## 2022-02-17 RX ORDER — AMLODIPINE BESYLATE 5 MG/1
5 TABLET ORAL DAILY
Status: DISCONTINUED | OUTPATIENT
Start: 2022-02-17 | End: 2022-02-18 | Stop reason: HOSPADM

## 2022-02-17 RX ORDER — AMLODIPINE BESYLATE 10 MG/1
10 TABLET ORAL DAILY
Status: DISCONTINUED | OUTPATIENT
Start: 2022-02-17 | End: 2022-02-17

## 2022-02-17 RX ORDER — COLCHICINE 0.6 MG/1
0.6 TABLET ORAL DAILY
Status: DISCONTINUED | OUTPATIENT
Start: 2022-02-17 | End: 2022-02-18 | Stop reason: HOSPADM

## 2022-02-17 RX ORDER — MONTELUKAST SODIUM 10 MG/1
10 TABLET ORAL
Status: DISCONTINUED | OUTPATIENT
Start: 2022-02-17 | End: 2022-02-17

## 2022-02-17 RX ORDER — FLUTICASONE PROPIONATE 50 MCG
2 SPRAY, SUSPENSION (ML) NASAL DAILY
Status: DISCONTINUED | OUTPATIENT
Start: 2022-02-17 | End: 2022-02-18 | Stop reason: HOSPADM

## 2022-02-17 RX ORDER — ALBUTEROL SULFATE 90 UG/1
2 AEROSOL, METERED RESPIRATORY (INHALATION) EVERY 6 HOURS PRN
Status: DISCONTINUED | OUTPATIENT
Start: 2022-02-17 | End: 2022-02-18 | Stop reason: HOSPADM

## 2022-02-17 RX ORDER — NAPROXEN 250 MG/1
500 TABLET ORAL 2 TIMES DAILY WITH MEALS
Status: DISCONTINUED | OUTPATIENT
Start: 2022-02-17 | End: 2022-02-17

## 2022-02-17 RX ORDER — LOSARTAN POTASSIUM 25 MG/1
25 TABLET ORAL DAILY
Status: DISCONTINUED | OUTPATIENT
Start: 2022-02-17 | End: 2022-02-18 | Stop reason: HOSPADM

## 2022-02-17 RX ORDER — MONTELUKAST SODIUM 10 MG/1
10 TABLET ORAL
Status: DISCONTINUED | OUTPATIENT
Start: 2022-02-18 | End: 2022-02-18 | Stop reason: HOSPADM

## 2022-02-17 RX ORDER — NAPROXEN 250 MG/1
250 TABLET ORAL 2 TIMES DAILY WITH MEALS
Status: DISCONTINUED | OUTPATIENT
Start: 2022-02-17 | End: 2022-02-18 | Stop reason: HOSPADM

## 2022-02-17 RX ORDER — COLCHICINE 0.6 MG/1
1.2 TABLET ORAL ONCE
Status: COMPLETED | OUTPATIENT
Start: 2022-02-17 | End: 2022-02-17

## 2022-02-17 RX ADMIN — AMLODIPINE BESYLATE 5 MG: 5 TABLET ORAL at 10:08

## 2022-02-17 RX ADMIN — OXYCODONE HYDROCHLORIDE 2.5 MG: 5 TABLET ORAL at 06:53

## 2022-02-17 RX ADMIN — COLCHICINE 0.6 MG: 0.6 TABLET ORAL at 12:26

## 2022-02-17 RX ADMIN — SODIUM CHLORIDE, SODIUM GLUCONATE, SODIUM ACETATE, POTASSIUM CHLORIDE, MAGNESIUM CHLORIDE, SODIUM PHOSPHATE, DIBASIC, AND POTASSIUM PHOSPHATE 75 ML/HR: .53; .5; .37; .037; .03; .012; .00082 INJECTION, SOLUTION INTRAVENOUS at 10:09

## 2022-02-17 RX ADMIN — NAPROXEN 250 MG: 250 TABLET ORAL at 17:24

## 2022-02-17 RX ADMIN — ASPIRIN 81 MG: 81 TABLET, COATED ORAL at 10:08

## 2022-02-17 RX ADMIN — ACETAMINOPHEN 650 MG: 325 TABLET, FILM COATED ORAL at 10:09

## 2022-02-17 RX ADMIN — ACETAMINOPHEN 650 MG: 325 TABLET, FILM COATED ORAL at 17:24

## 2022-02-17 RX ADMIN — COLCHICINE 1.2 MG: 0.6 TABLET ORAL at 10:09

## 2022-02-17 RX ADMIN — VANCOMYCIN HYDROCHLORIDE 1250 MG: 500 INJECTION, POWDER, LYOPHILIZED, FOR SOLUTION INTRAVENOUS at 00:50

## 2022-02-17 RX ADMIN — LOSARTAN POTASSIUM 25 MG: 25 TABLET, FILM COATED ORAL at 10:08

## 2022-02-17 RX ADMIN — ENOXAPARIN SODIUM 40 MG: 40 INJECTION SUBCUTANEOUS at 10:10

## 2022-02-17 NOTE — ASSESSMENT & PLAN NOTE
-patient reports multiple joint pain that has been there for years especially on the uncles knee of risk and elbows  -reports that for the last 4 days the right knee has been a little bit more swollen than usual, extremely tender and it has increased temperature  No erythema  -denies any recent trauma  -no history of , septic arthritis, hemochromatosis, Lyme disease, tick bite, diabetes neuropathy, trauma, connective tissue disease, SLE, RA, vasculitis, no recent vital infection, no sick contacts  he never follow with Rheumatology for these multiple joint pain in the last few years  -patient does meet sepsis criteria with elevated white blood cell count, leukocytosis, tachycardia, elevated lactic acid  -patient is sexually active with the wife, denies any signs of  STD  -no history of alcohol or drug use   -no urinary complaints  -elevated CRP  -elevated lactic acid  -x-ray of the right knee unrevealing  Does have degenerative changes and supra patellar joint effusion    Plan  -continue IV antibiotics with vancomycin /ceftriaxone--> discontinue after arthrocentesis fluid analysis showed gout  -follow uric acid--> 10 6  -follow blood cultures  -continue monitor lactic acid  -continue fluid hydration   -follow urinalysis  -continue trending CBC  -continue monitoring vital signs  -follow ESR  -consulted Rheumatology  -initiate patient on colchicine

## 2022-02-17 NOTE — PROGRESS NOTES
Terrence U  68   Progress Note - Pieter August 1980, 46 y o  male MRN: [id number]  Unit/Bed#: -[place] Encounter: [id number]  Primary Care Provider: KATARINA Higgins   Date and time admitted to hospital: 2/16/2022 11:19 AM    * SIRS secondary to gout  Assessment & Plan  -patient reports multiple joint pain that has been there for years especially on the uncles knee of risk and elbows  -reports that for the last 4 days the right knee has been a little bit more swollen than usual, extremely tender and it has increased temperature  No erythema  -denies any recent trauma  -no history of , septic arthritis, hemochromatosis, Lyme disease, tick bite, diabetes neuropathy, trauma, connective tissue disease, SLE, RA, vasculitis, no recent vital infection, no sick contacts  he never follow with Rheumatology for these multiple joint pain in the last few years  -patient does meet sepsis criteria with elevated white blood cell count, leukocytosis, tachycardia, elevated lactic acid  -patient is sexually active with the wife, denies any signs of  STD  -no history of alcohol or drug use   -no urinary complaints  -elevated CRP  -elevated lactic acid  -x-ray of the right knee unrevealing  Does have degenerative changes and supra patellar joint effusion    Plan  -continue IV antibiotics with vancomycin /ceftriaxone--> discontinue after arthrocentesis fluid analysis showed gout  -follow uric acid--> 10 6  -follow blood cultures  -continue monitor lactic acid  -continue fluid hydration   -follow urinalysis  -continue trending CBC  -continue monitoring vital signs  -follow ESR  -consulted Rheumatology  -initiate patient on colchicine      Obesity, Class III, BMI 40-49 9 (morbid obesity) ([facility] [address] )  Assessment & Plan  -patient is being evaluated for bariatric surgery    EVE (obstructive sleep apnea)  Assessment & Plan  -severe sleep apnea on BiPAP 25/20 at nighttime    Mild intermittent asthma without complication  Assessment & Plan  -currently no exacerbation will control home Singulair ease and albuterol p r n  Essential hypertension  Assessment & Plan  -blood pressure mildly elevated likely secondary to pain  -continue on amlodipine 10 mg  -will hold hydrochlorothiazide a in the view of suspicion having joint pain that could be related to gout  Come be restarted once uric acid is between normal limits  Plan  -decrease the dose of amlodipine to 5 mg and started the patient on losartan for his uricosuric effect  -continue holding hydrochlorothiazide          VTE Pharmacologic Prophylaxis:   VTE Score: 3 Moderate Risk (Score 3-4) - Pharmacological DVT Prophylaxis Ordered: Enoxaparin (Lovenox)  Mechanical VTE Prophylaxis in Place: Yes    Patient Centered Rounds: I have performed bedside rounds with nursing staff today  Current Length of Stay: 1 day(s)    Current Patient Status: Inpatient     Discharge Plan / Estimated Discharge Date: Anticipate discharge tomorrow to home  Code Status: Level 1 - Full Code      Subjective:   Patient reports that overnight multiple joints were painful including the ankles and the rest   No fevers, no erythema, just decreased range of motion due to pain  Objective:     Vitals:   Temp (24hrs), Av 3 °F (37 4 °C), Min:97 7 °F (36 5 °C), Max:100 3 °F (37 9 °C)    Temp:  [97 7 °F (36 5 °C)-100 3 °F (37 9 °C)] 100 3 °F (37 9 °C)  HR:  [] 107  Resp:  [16-29] 16  BP: (131-164)/(69-96) 136/89  SpO2:  [94 %-100 %] 95 %  Body mass index is 49 07 kg/m²  Input and Output Summary (last 24 hours): Intake/Output Summary (Last 24 hours) at 2022 1209  Last data filed at 2022 1166  Gross per 24 hour   Intake 530 ml   Output 1550 ml   Net -1020 ml       Physical Exam:     Physical Exam  Constitutional:       General: He is not in acute distress  Appearance: He is obese  HENT:      Head: Normocephalic and atraumatic     Cardiovascular:      Rate and Rhythm: Normal rate  Pulses: Normal pulses  Pulmonary:      Effort: Pulmonary effort is normal    Abdominal:      General: Abdomen is flat  Bowel sounds are normal       Palpations: Abdomen is soft  Musculoskeletal:         General: Tenderness present  Normal range of motion  Cervical back: Normal range of motion  Right lower leg: No edema  Left lower leg: No edema  Skin:     General: Skin is warm  Capillary Refill: Capillary refill takes less than 2 seconds  Findings: No erythema  Neurological:      General: No focal deficit present  Mental Status: He is alert and oriented to person, place, and time  Mental status is at baseline  Psychiatric:         Mood and Affect: Mood normal           Additional Data:     Labs:  Results from last 7 days   Lab Units 02/17/22  0602   WBC Thousand/uL 15 57*   HEMOGLOBIN g/dL 12 9   HEMATOCRIT % 39 9   PLATELETS Thousands/uL 472*   NEUTROS PCT % 75   LYMPHS PCT % 14   MONOS PCT % 9   EOS PCT % 1     Results from last 7 days   Lab Units 02/17/22  0602 02/16/22  1223 02/16/22  1223   SODIUM mmol/L 138   < > 137   POTASSIUM mmol/L 3 2*   < > 3 3*   CHLORIDE mmol/L 98   < > 98   CO2 mmol/L 24   < > 26   BUN mg/dL 16   < > 16   CREATININE mg/dL 1 18   < > 1 30*   ANION GAP mmol/L 16*   < > 13   CALCIUM mg/dL 9 5   < > 9 7   ALBUMIN g/dL  --   --  4 2   TOTAL BILIRUBIN mg/dL  --   --  0 57   ALK PHOS U/L  --   --  51 0   ALT U/L  --   --  20   AST U/L  --   --  15   GLUCOSE RANDOM mg/dL 109   < > 117    < > = values in this interval not displayed       Results from last 7 days   Lab Units 02/16/22  1223   INR  1 07             Results from last 7 days   Lab Units 02/17/22  0828 02/17/22  0617 02/17/22  0602 02/16/22  1223   LACTIC ACID mmol/L 3 0*  --  3 0* 2 3*   PROCALCITONIN ng/ml  --  0 29*  --  0 26*       Imaging: Reviewed radiology reports from this admission including: xray(s)    Recent Cultures (last 7 days):     Results from last 7 days   Lab Units 02/16/22  1617 02/16/22  1230 02/16/22  1223   BLOOD CULTURE   --  Received in Microbiology Lab  Culture in Progress  Received in Microbiology Lab  Culture in Progress     GRAM STAIN RESULT  1+ Polys  No organisms seen  --   --    BODY FLUID CULTURE, STERILE  No growth  --   --        Lines/Drains:  Invasive Devices  Report    Peripheral Intravenous Line            Peripheral IV 02/16/22 Left Antecubital 1 day    Peripheral IV 02/16/22 Left Wrist <1 day                Telemetry:   Telemetry Orders (From admission, onward)             24 Hour Telemetry Monitoring  Continuous x 24 Hours (Telem)        Expiring   References:    Telemetry Guidelines   Question:  Reason for 24 Hour Telemetry  Answer:  Metabolic/Electrolyte Disturbance with High Probability of Dysrhythmia (K level <3 or >6, or KCL infusion >=10mEq/hr)                    Last 24 Hours Medication List:   Current Facility-Administered Medications   Medication Dose Route Frequency Provider Last Rate    acetaminophen  650 mg Oral Q6H PRN Quincy Best MD      albuterol  2 puff Inhalation Q6H PRN Quincy Best MD      amLODIPine  5 mg Oral Daily Quincy Best MD      aspirin  81 mg Oral Daily Quincy Best MD      cefTRIAXone  1,000 mg Intravenous Q24H Quincy Best MD 1,000 mg (02/16/22 1811)    colchicine  0 6 mg Oral Daily Quincy Best MD      enoxaparin  40 mg Subcutaneous Daily Quincy Best MD      fluticasone  2 spray Nasal Daily Quicny Best MD      losartan  25 mg Oral Daily Quincy Best MD      [facility] ON 2/18/2022] montelukast  10 mg Oral HS Opal Ayala PA-C      multi-electrolyte  75 mL/hr Intravenous Continuous Quincy Best MD 75 mL/hr (02/17/22 1009)    naproxen  250 mg Oral BID With Meals Quincy Best MD      vancomycin  15 mg/kg (Adjusted) Intravenous Q12H Aiyana Burroughs MD 1,250 mg (02/17/22 0050)        Today, Patient Was Seen By: Quincy Best MD    ** Please Note: This note has been constructed using a voice recognition system   **

## 2022-02-17 NOTE — PLAN OF CARE
Problem: MOBILITY - ADULT  Goal: Maintain or return to baseline ADL function  Description: INTERVENTIONS:  -  Assess patient's ability to carry out ADLs; assess patient's baseline for ADL function and identify physical deficits which impact ability to perform ADLs (bathing, care of mouth/teeth, toileting, grooming, dressing, etc )  - Assess/evaluate cause of self-care deficits   - Assess range of motion  - Assess patient's mobility; develop plan if impaired  - Assess patient's need for assistive devices and provide as appropriate  - Encourage maximum independence but intervene and supervise when necessary  - Involve family in performance of ADLs  - Assess for home care needs following discharge   - Consider OT consult to assist with ADL evaluation and planning for discharge  - Provide patient education as appropriate  Outcome: Not Progressing  Goal: Maintains/Returns to pre admission functional level  Description: INTERVENTIONS:  - Perform BMAT or MOVE assessment daily    - Set and communicate daily mobility goal to care team and patient/family/caregiver  - Collaborate with rehabilitation services on mobility goals if consulted  - Perform Range of Motion 2 times a day  - Reposition patient every 2 hours    - Dangle patient 2 times a day  - Stand patient prn times a day  - Ambulate patient prn times a day  - Out of bed to chair prn times a day   - Out of bed for meals prn times a day  - Out of bed for toileting  - Record patient progress and toleration of activity level   Outcome: Not Progressing     Problem: PAIN - ADULT  Goal: Verbalizes/displays adequate comfort level or baseline comfort level  Description: Interventions:  - Encourage patient to monitor pain and request assistance  - Assess pain using appropriate pain scale  - Administer analgesics based on type and severity of pain and evaluate response  - Implement non-pharmacological measures as appropriate and evaluate response  - Consider cultural and social influences on pain and pain management  - Notify physician/advanced practitioner if interventions unsuccessful or patient reports new pain  Outcome: Not Progressing     Problem: INFECTION - ADULT  Goal: Absence or prevention of progression during hospitalization  Description: INTERVENTIONS:  - Assess and monitor for signs and symptoms of infection  - Monitor lab/diagnostic results  - Monitor all insertion sites, i e  indwelling lines, tubes, and drains  - Monitor endotracheal if appropriate and nasal secretions for changes in amount and color  - [place] appropriate cooling/warming therapies per order  - Administer medications as ordered  - Instruct and encourage patient and family to use good hand hygiene technique  - Identify and instruct in appropriate isolation precautions for identified infection/condition  Outcome: Not Progressing  Goal: Absence of fever/infection during neutropenic period  Description: INTERVENTIONS:  - Monitor WBC    Outcome: Not Progressing     Problem: DISCHARGE PLANNING  Goal: Discharge to home or other facility with appropriate resources  Description: INTERVENTIONS:  - Identify barriers to discharge w/patient and caregiver  - Arrange for needed discharge resources and transportation as appropriate  - Identify discharge learning needs (meds, wound care, etc )  - Arrange for interpretive services to assist at discharge as needed  - Refer to Case Management Department for coordinating discharge planning if the patient needs post-hospital services based on physician/advanced practitioner order or complex needs related to functional status, cognitive ability, or social support system  Outcome: Not Progressing     Problem: Knowledge Deficit  Goal: Patient/family/caregiver demonstrates understanding of disease process, treatment plan, medications, and discharge instructions  Description: Complete learning assessment and assess knowledge base    Interventions:  - Provide teaching at level of understanding  - Provide teaching via preferred learning methods  Outcome: Not Progressing

## 2022-02-17 NOTE — ASSESSMENT & PLAN NOTE
-blood pressure mildly elevated likely secondary to pain  -continue on amlodipine 10 mg  -will hold hydrochlorothiazide a in the view of suspicion having joint pain that could be related to gout  Come be restarted once uric acid is between normal limits    Plan  -decrease the dose of amlodipine to 5 mg and started the patient on losartan for his uricosuric effect  -continue holding hydrochlorothiazide

## 2022-02-17 NOTE — TELEPHONE ENCOUNTER
Patient with newly diagnosed gout, currently hospitalized at [facility]  Please call and schedule him for hospital follow-up next Wednesday, Feb 23rd at 2:30pm at [facility]

## 2022-02-17 NOTE — PLAN OF CARE
Problem: MOBILITY - ADULT  Goal: Maintain or return to baseline ADL function  Description: INTERVENTIONS:  -  Assess patient's ability to carry out ADLs; assess patient's baseline for ADL function and identify physical deficits which impact ability to perform ADLs (bathing, care of mouth/teeth, toileting, grooming, dressing, etc )  - Assess/evaluate cause of self-care deficits   - Assess range of motion  - Assess patient's mobility; develop plan if impaired  - Assess patient's need for assistive devices and provide as appropriate  - Encourage maximum independence but intervene and supervise when necessary  - Involve family in performance of ADLs  - Assess for home care needs following discharge   - Consider OT consult to assist with ADL evaluation and planning for discharge  - Provide patient education as appropriate  Outcome: Progressing  Goal: Maintains/Returns to pre admission functional level  Description: INTERVENTIONS:  - Perform BMAT or MOVE assessment daily    - Set and communicate daily mobility goal to care team and patient/family/caregiver  - Collaborate with rehabilitation services on mobility goals if consulted  - Perform Range of Motion 4 times a day  - Reposition patient every 2 hours    - Dangle patient 3 times a day  - Stand patient 3 times a day  - Ambulate patient 3 times a day  - Out of bed to chair 3 times a day   - Out of bed for meals 3 times a day  - Out of bed for toileting  - Record patient progress and toleration of activity level   Outcome: Progressing     Problem: Potential for Falls  Goal: Patient will remain free of falls  Description: INTERVENTIONS:  - Educate patient/family on patient safety including physical limitations  - Instruct patient to call for assistance with activity   - Consult OT/PT to assist with strengthening/mobility   - Keep Call bell within reach  - Keep bed low and locked with side rails adjusted as appropriate  - Keep care items and personal belongings within reach  - Initiate and maintain comfort rounds  - Make Fall Risk Sign visible to staff  - Offer Toileting every 2 Hours, in advance of need  - Initiate/Maintain alarm  - Obtain necessary fall risk management equipment:   - Apply yellow socks and bracelet for high fall risk patients  - Consider moving patient to room near nurses station  Outcome: Progressing     Problem: PAIN - ADULT  Goal: Verbalizes/displays adequate comfort level or baseline comfort level  Description: Interventions:  - Encourage patient to monitor pain and request assistance  - Assess pain using appropriate pain scale  - Administer analgesics based on type and severity of pain and evaluate response  - Implement non-pharmacological measures as appropriate and evaluate response  - Consider cultural and social influences on pain and pain management  - Notify physician/advanced practitioner if interventions unsuccessful or patient reports new pain  Outcome: Progressing     Problem: INFECTION - ADULT  Goal: Absence or prevention of progression during hospitalization  Description: INTERVENTIONS:  - Assess and monitor for signs and symptoms of infection  - Monitor lab/diagnostic results  - Monitor all insertion sites, i e  indwelling lines, tubes, and drains  - Monitor endotracheal if appropriate and nasal secretions for changes in amount and color  - [place] appropriate cooling/warming therapies per order  - Administer medications as ordered  - Instruct and encourage patient and family to use good hand hygiene technique  - Identify and instruct in appropriate isolation precautions for identified infection/condition  Outcome: Progressing     Problem: DISCHARGE PLANNING  Goal: Discharge to home or other facility with appropriate resources  Description: INTERVENTIONS:  - Identify barriers to discharge w/patient and caregiver  - Arrange for needed discharge resources and transportation as appropriate  - Identify discharge learning needs (meds, wound care, etc )  - Arrange for interpretive services to assist at discharge as needed  - Refer to Case Management Department for coordinating discharge planning if the patient needs post-hospital services based on physician/advanced practitioner order or complex needs related to functional status, cognitive ability, or social support system  Outcome: Progressing     Problem: Knowledge Deficit  Goal: Patient/family/caregiver demonstrates understanding of disease process, treatment plan, medications, and discharge instructions  Description: Complete learning assessment and assess knowledge base    Interventions:  - Provide teaching at level of understanding  - Provide teaching via preferred learning methods  Outcome: Progressing

## 2022-02-17 NOTE — UTILIZATION REVIEW
Initial Clinical Review    Admission: Date/Time/Statement:   Admission Orders (From admission, onward)     Ordered        02/16/22 1441  Inpatient Admission  Once                      Orders Placed This Encounter   Procedures    Inpatient Admission     Standing Status:   Standing     Number of Occurrences:   1     Order Specific Question:   Level of Care     Answer:   Med Surg [16]     Order Specific Question:   Estimated length of stay     Answer:   More than 2 Midnights     Order Specific Question:   Certification     Answer:   I certify that inpatient services are medically necessary for this patient for a duration of greater than two midnights  See H&P and MD Progress Notes for additional information about the patient's course of treatment  ED Arrival Information     Expected Arrival Acuity    - 2/16/2022 11:07 Emergent         Means of arrival Escorted by Service Admission type    Wheelchair Friend Hospitalist Emergency         Arrival complaint    right knee pain - bilateral ankle pain        Chief Complaint   Patient presents with    Leg Pain     left posterior knee pain since Saturday  states it feels like a "charliehorse"    Foot Pain     b/l foot pain, describes as "plantar facititis" pain       Initial Presentation:  46year old male, presented to the ED @ [address], from home via wheelchair with friend  Admitted as Inpatient due to  Sepsis likely secondary to Pyogenic arthritis of right knee joint  PMH:  EVE, asthma, hypertension, obesity, history of joint pains  Date: 02/16/2022  Acute change in right knee pain, the past 4 days more bothersome than usual       He is unable to step on the right side because of pain, a decrease in his range of motion, is extremely tender with increased temperature but no erythema  He reports no trauma  Start IV Abx  Will do right knee arthrocentesis on fluid analysis  Follow up Uric Acid, ESR  Follow up blood cultures    Continue to monitor & trend lactic acid   Start IV flds  Follow up UA  Monitor & trend CBC       02/16/2022  Consult Ortho: right knee pain, multiple joint pain  IR performing aspiration to send for fluid analysis  Continue IV abx, Ceftriaxone & Vanco   Pain control PRN  WBAT RLE       02/16/2022  IR Procedure: Aspiration  Findings:  Sonographic evaluation of the right knee demonstrated small effusion  There was significantly thickened hypoechoic tissue in the region of the suprapatellar bursa, which may represent inflammatory tissue/synovitis  Needle was advanced to the patellofemoral articulation and 2 mL of serosanguineous fluid was aspirated and sent for studies  Day 2: 02/17/2022  Continue IV abx: Vanco / ceftriaxone  Follow and trend Uric acid > 10 6  Continue IV flds  Consult Rheumatology  Initiate colchicine        VTE Prophylaxis: Enoxaparin (Lovenox)  / sequential compression device     ED Triage Vitals   Temperature Pulse Respirations Blood Pressure SpO2   02/16/22 1115 02/16/22 1115 02/16/22 1115 02/16/22 1115 02/16/22 1115   100 3 °F (37 9 °C) (!) 138 18 144/99 99 %      Temp Source Heart Rate Source Patient Position - Orthostatic VS BP Location FiO2 (%)   02/16/22 1115 02/16/22 1203 02/16/22 1203 02/16/22 1203 02/16/22 2038   Oral Monitor Lying Right arm 21      Pain Score       02/16/22 1115       4          Wt Readings from Last 1 Encounters:   02/16/22 118 kg (259 lb 11 2 oz)     Additional Vital Signs:   Date/Time Temp Pulse Resp BP MAP (mmHg) SpO2 FiO2 (%) O2 Device O2 Interface Device Patient Position - Orthostatic VS   02/17/22 1115 100 3 °F (37 9 °C) 107 Abnormal  16 136/89 -- 95 % -- -- -- Lying   02/17/22 0750 99 2 °F (37 3 °C) 86 16 131/84 -- 94 % -- -- -- Lying   02/17/22 0437 -- -- -- -- -- 97 % 21 BiPAP Full face mask --   02/17/22 0300 97 7 °F (36 5 °C) 106 Abnormal  21 149/96 113 98 % -- BiPAP -- Lying   02/17/22 0100 98 7 °F (37 1 °C) 97 20 143/84 104 100 % -- BiPAP -- Lying   02/16/22 2140 99 °F (37 2 °C) -- -- -- -- -- -- -- -- --   22 -- -- -- -- -- 97 % 21 BiPAP Full face mask --   22 99 4 °F (37 4 °C) 108 Abnormal  20 135/89 104 97 % -- None (Room air) -- Lying   22 1520 99 9 °F (37 7 °C) 111 Abnormal  22 135/79 -- 98 % -- -- -- Lying   22 1411 99 °F (37 2 °C) 111 Abnormal  29 Abnormal  164/69 -- 97 % -- None (Room air) -- Lying   22 1316 100 3 °F (37 9 °C) 118 Abnormal  29 Abnormal  156/91 -- 99 % -- None (Room air) -- Lying   22 1203 100 °F (37 8 °C) 128 Abnormal  28 Abnormal  165/88 -- 100 % -- None (Room air) -- Lying     Date and Time Eye Opening Best Verbal Response Best Motor Response Jeffery Coma Scale Score   22 1000 4 5 6 15   22 2100 4 5 6 15   22 1128 4 5 6 15     2022 @1421  Chest X  No acute cardiopulmonary disease  2022 @ 1424  Right knee X:  Minimal degenerative change   Small Suprapatellar joint effusion       2022 @ 1133  EC, Sinus Tachycardia    Pertinent Labs/Diagnostic Test Results:   Results from last 7 days   Lab Units 22  1242   SARS-COV-2  Negative     Results from last 7 days   Lab Units 22  0602 22  1223   WBC Thousand/uL 15 57* 19 25*   HEMOGLOBIN g/dL 12 9 14 0   HEMATOCRIT % 39 9 43 5   PLATELETS Thousands/uL 472* 480*   NEUTROS ABS Thousands/µL 11 63* 15 26*     Results from last 7 days   Lab Units 22  0602 22  1223   SODIUM mmol/L 138 137   POTASSIUM mmol/L 3 2* 3 3*   CHLORIDE mmol/L 98 98   CO2 mmol/L 24 26   ANION GAP mmol/L 16* 13   BUN mg/dL 16 16   CREATININE mg/dL 1 18 1 30*   EGFR ml/min/1 73sq m 77 68   CALCIUM mg/dL 9 5 9 7   MAGNESIUM mg/dL 2 1  --      Results from last 7 days   Lab Units 22  1223   AST U/L 15   ALT U/L 20   ALK PHOS U/L 51 0   TOTAL PROTEIN g/dL 9 8*   ALBUMIN g/dL 4 2   TOTAL BILIRUBIN mg/dL 0 57     Results from last 7 days   Lab Units 22  0602 22  1223   GLUCOSE RANDOM mg/dL 109 117     Results from last 7 days   Lab Units 02/16/22  1426 02/16/22  1223   HS TNI 0HR ng/L  --  5   HS TNI 2HR ng/L 6  --    HSTNI D2 ng/L 1  --      Results from last 7 days   Lab Units 02/16/22  1223   PROTIME seconds 13 8   INR  1 07   PTT seconds 41*     Results from last 7 days   Lab Units 02/17/22  0617   TSH 3RD GENERATON uIU/mL 3 013     Results from last 7 days   Lab Units 02/17/22  0617 02/16/22  1223   PROCALCITONIN ng/ml 0 29* 0 26*     Results from last 7 days   Lab Units 02/17/22  0828 02/17/22  0602 02/16/22  1223   LACTIC ACID mmol/L 3 0* 3 0* 2 3*     Results from last 7 days   Lab Units 02/16/22  1223   CRP mg/dL 19 6*   SED RATE mm/hour 28*     Results from last 7 days   Lab Units 02/16/22  1242   INFLUENZA A PCR  Negative   INFLUENZA B PCR  Negative   RSV PCR  Negative     Results from last 7 days   Lab Units 02/16/22  1617 02/16/22  1230 02/16/22  1223   BLOOD CULTURE   --  Received in Microbiology Lab  Culture in Progress  Received in Microbiology Lab  Culture in Progress     GRAM STAIN RESULT  1+ Polys  No organisms seen  --   --    BODY FLUID CULTURE, STERILE  No growth  --   --      Results from last 7 days   Lab Units 02/17/22  1028   CRYSTALS, SYNOVIAL FLUID  Positive for Monosodium Urate Crystals     ED Treatment:   Medication Administration from 02/16/2022 1107 to 02/16/2022 1513       Date/Time Order Dose Route Action     02/16/2022 1251 cefepime (MAXIPIME) IVPB (premix in dextrose) 2,000 mg 50 mL 2,000 mg Intravenous New Bag     02/16/2022 1319 vancomycin (VANCOCIN) 1,750 mg in sodium chloride 0 9 % 500 mL IVPB 1,750 mg Intravenous New Bag     02/16/2022 1251 acetaminophen (TYLENOL) tablet 975 mg 975 mg Oral Given     02/16/2022 1252 ketorolac (TORADOL) injection 15 mg 15 mg Intravenous Given     02/16/2022 1318 sodium chloride 0 9 % bolus 1,750 mL 1,750 mL Intravenous New Bag        Past Medical History:   Diagnosis Date    Ankle swelling     Arthritis     Asthma     Dizziness     Heartburn     Hypertension     Increased frequency of urination     Intolerance to heat     Joint pain     and joint swelling    Morbid obesity with BMI of 50 0-59 9, adult ([facility]) 12/16/2019    Sleep apnea     SOB (shortness of breath)      Present on Admission:   Essential hypertension   EVE (obstructive sleep apnea)   Obesity, Class III, BMI 40-49 9 (morbid obesity) ([facility])   Mild intermittent asthma without complication      Admitting Diagnosis: Foot pain [M79 673]  Leg pain [M79 606]  Polyarthritis [M13 0]  Acute sepsis ([facility] [address] ) [A41 9]  Age/Sex: 46 y o  male  Admission Orders:     Telemetry  CV diet - No Beef  Ambulate TID  BiPAP HS  IR - aspiration of right knee joint  Consult PT/OT  Shadi SCDs    Scheduled Medications:  amLODIPine, 5 mg, Oral, Daily  aspirin, 81 mg, Oral, Daily  colchicine, 0 6 mg, Oral, Daily  enoxaparin, 40 mg, Subcutaneous, Daily  fluticasone, 2 spray, Nasal, Daily  losartan, 25 mg, Oral, Daily  [START ON 2/18/2022] montelukast, 10 mg, Oral, HS  naproxen, 250 mg, Oral, BID With Meals      Continuous IV Infusions:  multi-electrolyte, 75 mL/hr, Intravenous, Continuous      PRN Meds:  acetaminophen, 650 mg, Oral, Q6H PRN  albuterol, 2 puff, Inhalation, Q6H PRN        IP CONSULT TO PHARMACY  IP CONSULT TO ORTHOPEDIC SURGERY  INPATIENT CONSULT TO IR  IP CONSULT TO RHEUMATOLOGY    Network Utilization Review Department  ATTENTION: Please call with any questions or concerns to [phone] and carefully listen to the prompts so that you are directed to the right person  All voicemails are confidential   Yolanda Kaplan all requests for admission clinical reviews, approved or denied determinations and any other requests to dedicated fax number below belonging to the campus where the patient is receiving treatment   List of dedicated fax numbers for the Facilities:  [address] DENIALS (Administrative/Medical Necessity) [phone]   [address] (Maternity/NICU/Pediatrics) [phone] [address] [facility]  [phone]   [address] [facility] [place] [address] [id number] [place] [address] [address] [facility] [place] [address] [phone]

## 2022-02-17 NOTE — TELEPHONE ENCOUNTER
Patient's wife left message stating patient is in the hospital and is currently not able to use his BiPAP

## 2022-02-18 VITALS
WEIGHT: 259.7 LBS | OXYGEN SATURATION: 100 % | DIASTOLIC BLOOD PRESSURE: 80 MMHG | SYSTOLIC BLOOD PRESSURE: 122 MMHG | BODY MASS INDEX: 49.03 KG/M2 | HEIGHT: 61 IN | HEART RATE: 101 BPM | RESPIRATION RATE: 16 BRPM | TEMPERATURE: 97.5 F

## 2022-02-18 LAB
ANION GAP SERPL CALCULATED.3IONS-SCNC: 11 MMOL/L (ref 4–13)
BASOPHILS # BLD AUTO: 0.06 THOUSANDS/ΜL (ref 0–0.1)
BASOPHILS NFR BLD AUTO: 0 % (ref 0–1)
BUN SERPL-MCNC: 18 MG/DL (ref 6–20)
CALCIUM SERPL-MCNC: 9.3 MG/DL (ref 8.4–10.2)
CHLORIDE SERPL-SCNC: 98 MMOL/L (ref 96–108)
CO2 SERPL-SCNC: 29 MMOL/L (ref 22–33)
CREAT SERPL-MCNC: 1.19 MG/DL (ref 0.5–1.2)
EOSINOPHIL # BLD AUTO: 0.28 THOUSAND/ΜL (ref 0–0.61)
EOSINOPHIL NFR BLD AUTO: 2 % (ref 0–6)
ERYTHROCYTE [DISTWIDTH] IN BLOOD BY AUTOMATED COUNT: 13.5 % (ref 11.6–15.1)
GFR SERPL CREATININE-BSD FRML MDRD: 76 ML/MIN/1.73SQ M
GLUCOSE SERPL-MCNC: 100 MG/DL (ref 65–140)
HCT VFR BLD AUTO: 38.2 % (ref 36.5–49.3)
HGB BLD-MCNC: 12.1 G/DL (ref 12–17)
IMM GRANULOCYTES # BLD AUTO: 0.1 THOUSAND/UL (ref 0–0.2)
IMM GRANULOCYTES NFR BLD AUTO: 1 % (ref 0–2)
LYMPHOCYTES # BLD AUTO: 2.92 THOUSANDS/ΜL (ref 0.6–4.47)
LYMPHOCYTES NFR BLD AUTO: 19 % (ref 14–44)
MCH RBC QN AUTO: 26.2 PG (ref 26.8–34.3)
MCHC RBC AUTO-ENTMCNC: 31.7 G/DL (ref 31.4–37.4)
MCV RBC AUTO: 83 FL (ref 82–98)
MONOCYTES # BLD AUTO: 1.89 THOUSAND/ΜL (ref 0.17–1.22)
MONOCYTES NFR BLD AUTO: 12 % (ref 4–12)
NEUTROPHILS # BLD AUTO: 10.4 THOUSANDS/ΜL (ref 1.85–7.62)
NEUTS SEG NFR BLD AUTO: 66 % (ref 43–75)
NRBC BLD AUTO-RTO: 0 /100 WBCS
PLATELET # BLD AUTO: 471 THOUSANDS/UL (ref 149–390)
PMV BLD AUTO: 9.7 FL (ref 8.9–12.7)
POTASSIUM SERPL-SCNC: 3.4 MMOL/L (ref 3.5–5)
PROCALCITONIN SERPL-MCNC: 0.46 NG/ML
RBC # BLD AUTO: 4.62 MILLION/UL (ref 3.88–5.62)
SODIUM SERPL-SCNC: 138 MMOL/L (ref 133–145)
WBC # BLD AUTO: 15.65 THOUSAND/UL (ref 4.31–10.16)

## 2022-02-18 PROCEDURE — 80048 BASIC METABOLIC PNL TOTAL CA: CPT | Performed by: INTERNAL MEDICINE

## 2022-02-18 PROCEDURE — 99255 IP/OBS CONSLTJ NEW/EST HI 80: CPT | Performed by: INTERNAL MEDICINE

## 2022-02-18 PROCEDURE — 85025 COMPLETE CBC W/AUTO DIFF WBC: CPT | Performed by: INTERNAL MEDICINE

## 2022-02-18 PROCEDURE — 99239 HOSP IP/OBS DSCHRG MGMT >30: CPT | Performed by: INTERNAL MEDICINE

## 2022-02-18 PROCEDURE — 84145 PROCALCITONIN (PCT): CPT

## 2022-02-18 RX ORDER — LOSARTAN POTASSIUM 25 MG/1
25 TABLET ORAL DAILY
Qty: 30 TABLET | Refills: 0 | Status: SHIPPED | OUTPATIENT
Start: 2022-02-19 | End: 2022-03-14 | Stop reason: SDUPTHER

## 2022-02-18 RX ORDER — POTASSIUM CHLORIDE 20 MEQ/1
40 TABLET, EXTENDED RELEASE ORAL ONCE
Status: COMPLETED | OUTPATIENT
Start: 2022-02-18 | End: 2022-02-18

## 2022-02-18 RX ORDER — PREDNISONE 10 MG/1
TABLET ORAL
Qty: 10 TABLET | Refills: 0 | Status: SHIPPED | OUTPATIENT
Start: 2022-02-18 | End: 2022-02-23 | Stop reason: SDUPTHER

## 2022-02-18 RX ORDER — COLCHICINE 0.6 MG/1
0.6 TABLET ORAL DAILY
Qty: 30 TABLET | Refills: 0 | Status: SHIPPED | OUTPATIENT
Start: 2022-02-19 | End: 2022-02-23 | Stop reason: SDUPTHER

## 2022-02-18 RX ADMIN — NAPROXEN 250 MG: 250 TABLET ORAL at 08:12

## 2022-02-18 RX ADMIN — POTASSIUM CHLORIDE 40 MEQ: 1500 TABLET, EXTENDED RELEASE ORAL at 08:12

## 2022-02-18 RX ADMIN — ASPIRIN 81 MG: 81 TABLET, COATED ORAL at 08:12

## 2022-02-18 RX ADMIN — LOSARTAN POTASSIUM 25 MG: 25 TABLET, FILM COATED ORAL at 08:12

## 2022-02-18 RX ADMIN — AMLODIPINE BESYLATE 5 MG: 5 TABLET ORAL at 08:12

## 2022-02-18 RX ADMIN — COLCHICINE 0.6 MG: 0.6 TABLET ORAL at 08:12

## 2022-02-18 RX ADMIN — ENOXAPARIN SODIUM 40 MG: 40 INJECTION SUBCUTANEOUS at 08:13

## 2022-02-18 NOTE — PLAN OF CARE
Problem: MOBILITY - ADULT  Goal: Maintain or return to baseline ADL function  Description: INTERVENTIONS:  -  Assess patient's ability to carry out ADLs; assess patient's baseline for ADL function and identify physical deficits which impact ability to perform ADLs (bathing, care of mouth/teeth, toileting, grooming, dressing, etc )  - Assess/evaluate cause of self-care deficits   - Assess range of motion  - Assess patient's mobility; develop plan if impaired  - Assess patient's need for assistive devices and provide as appropriate  - Encourage maximum independence but intervene and supervise when necessary  - Involve family in performance of ADLs  - Assess for home care needs following discharge   - Consider OT consult to assist with ADL evaluation and planning for discharge  - Provide patient education as appropriate  2/18/2022 1223 by Francisca Waddell RN  Outcome: Progressing  2/18/2022 1217 by Francisca Waddell RN  Outcome: Progressing  Goal: Maintains/Returns to pre admission functional level  Description: INTERVENTIONS:  - Perform BMAT or MOVE assessment daily    - Set and communicate daily mobility goal to care team and patient/family/caregiver  - Collaborate with rehabilitation services on mobility goals if consulted  - Perform Range of Motion 4 times a day  - Reposition patient every 2 hours    - Dangle patient 3 times a day  - Stand patient 3 times a day  - Ambulate patient 3 times a day  - Out of bed to chair 3 times a day   - Out of bed for meals 3 times a day  - Out of bed for toileting  - Record patient progress and toleration of activity level   2/18/2022 1223 by Francisca Waddell RN  Outcome: Progressing  2/18/2022 1217 by Francisca Waddell RN  Outcome: Progressing     Problem: Potential for Falls  Goal: Patient will remain free of falls  Description: INTERVENTIONS:  - Educate patient/family on patient safety including physical limitations  - Instruct patient to call for assistance with activity   - Consult OT/PT to assist with strengthening/mobility   - Keep Call bell within reach  - Keep bed low and locked with side rails adjusted as appropriate  - Keep care items and personal belongings within reach  - Initiate and maintain comfort rounds  - Make Fall Risk Sign visible to staff  - Offer Toileting every 2 Hours, in advance of need  - Initiate/Maintain alarm  - Obtain necessary fall risk management equipment:   - Apply yellow socks and bracelet for high fall risk patients  - Consider moving patient to room near nurses station  2/18/2022 1223 by Saundra Woodruff RN  Outcome: Progressing  2/18/2022 1217 by Saundra Woodruff RN  Outcome: Progressing     Problem: PAIN - ADULT  Goal: Verbalizes/displays adequate comfort level or baseline comfort level  Description: Interventions:  - Encourage patient to monitor pain and request assistance  - Assess pain using appropriate pain scale  - Administer analgesics based on type and severity of pain and evaluate response  - Implement non-pharmacological measures as appropriate and evaluate response  - Consider cultural and social influences on pain and pain management  - Notify physician/advanced practitioner if interventions unsuccessful or patient reports new pain  2/18/2022 1223 by Saundra Woodruff RN  Outcome: Progressing  2/18/2022 1217 by Saundra Woodruff RN  Outcome: Progressing     Problem: INFECTION - ADULT  Goal: Absence or prevention of progression during hospitalization  Description: INTERVENTIONS:  - Assess and monitor for signs and symptoms of infection  - Monitor lab/diagnostic results  - Monitor all insertion sites, i e  indwelling lines, tubes, and drains  - Monitor endotracheal if appropriate and nasal secretions for changes in amount and color  - [place] appropriate cooling/warming therapies per order  - Administer medications as ordered  - Instruct and encourage patient and family to use good hand hygiene technique  - Identify and instruct in appropriate isolation precautions for identified infection/condition  2/18/2022 1223 by Spenser Morales RN  Outcome: Progressing  2/18/2022 1217 by Spenser Morales RN  Outcome: Progressing     Problem: DISCHARGE PLANNING  Goal: Discharge to home or other facility with appropriate resources  Description: INTERVENTIONS:  - Identify barriers to discharge w/patient and caregiver  - Arrange for needed discharge resources and transportation as appropriate  - Identify discharge learning needs (meds, wound care, etc )  - Arrange for interpretive services to assist at discharge as needed  - Refer to Case Management Department for coordinating discharge planning if the patient needs post-hospital services based on physician/advanced practitioner order or complex needs related to functional status, cognitive ability, or social support system  2/18/2022 1223 by Spenser Morales RN  Outcome: Progressing  2/18/2022 1217 by Spenser Morales RN  Outcome: Progressing     Problem: Knowledge Deficit  Goal: Patient/family/caregiver demonstrates understanding of disease process, treatment plan, medications, and discharge instructions  Description: Complete learning assessment and assess knowledge base  Interventions:  - Provide teaching at level of understanding  - Provide teaching via preferred learning methods  2/18/2022 1223 by Spenser Morales RN  Outcome: Progressing  2/18/2022 1217 by Spenser Morales RN  Outcome: Progressing     Problem: Nutrition/Hydration-ADULT  Goal: Nutrient/Hydration intake appropriate for improving, restoring or maintaining nutritional needs  Description: Monitor and assess patient's nutrition/hydration status for malnutrition  Collaborate with interdisciplinary team and initiate plan and interventions as ordered  Monitor patient's weight and dietary intake as ordered or per policy  Utilize nutrition screening tool and intervene as necessary   Determine patient's food preferences and provide high-protein, high-caloric foods as appropriate       INTERVENTIONS:  - Monitor oral intake, urinary output, labs, and treatment plans  - Assess nutrition and hydration status and recommend course of action  - Evaluate amount of meals eaten  - Assist patient with eating if necessary   - Allow adequate time for meals  - Recommend/ encourage appropriate diets, oral nutritional supplements, and vitamin/mineral supplements  - Order, calculate, and assess calorie counts as needed  - Recommend, monitor, and adjust tube feedings and TPN/PPN based on assessed needs  - Assess need for intravenous fluids  - Provide specific nutrition/hydration education as appropriate  - Include patient/family/caregiver in decisions related to nutrition  2/18/2022 1223 by Aby Coates, RN  Outcome: Progressing  2/18/2022 1217 by Aby Coates RN  Outcome: Progressing

## 2022-02-18 NOTE — ASSESSMENT & PLAN NOTE
· Began spiking fevers @1100 2/17   Denies any SOB, cough, chest pain, urinary complaints, abdominal pain, N/V/D  · Likely 2/2 to acute gout flare  · abx d/c @ 1200 today  · Had elevated lactate this am, trended down 3 0 ---> 1 0 with IVF  · Will repeat CXR in setting of elevated procal  · Obtain blood cultures --> negative  · Flu/RSV/COVID negative on admission  · Benign abdominal exam   · UA negative   · Monitor fever curve, trend procal  · ID on board

## 2022-02-18 NOTE — PROGRESS NOTES
[facility]  Progress Note - Pieter Aceves 1980, 46 y o  male MRN: [id number]  Unit/Bed#: -[place] Encounter: [id number]  Primary Care Provider: KATARINA Shah   Date and time admitted to hospital: 2/16/2022 11:19 AM    * SIRS secondary to gout  Assessment & Plan  -patient reports multiple joint pain that has been there for years especially on the uncles knee of risk and elbows  -reports that for the last 4 days the right knee has been a little bit more swollen than usual, extremely tender and it has increased temperature  No erythema  -denies any recent trauma  -no history of , septic arthritis, hemochromatosis, Lyme disease, tick bite, diabetes neuropathy, trauma, connective tissue disease, SLE, RA, vasculitis, no recent vital infection, no sick contacts  he never follow with Rheumatology for these multiple joint pain in the last few years  -patient does meet sepsis criteria with elevated white blood cell count, leukocytosis, tachycardia, elevated lactic acid  -patient is sexually active with the wife, denies any signs of  STD  -no history of alcohol or drug use   -no urinary complaints  -elevated CRP  -elevated lactic acid  -x-ray of the right knee unrevealing  Does have degenerative changes and supra patellar joint effusion  Plan  -continue IV antibiotics with vancomycin /ceftriaxone--> discontinue after arthrocentesis fluid analysis showed gout  -follow uric acid--> 10 6  -follow blood cultures  -continue monitor lactic acid  -continue fluid hydration   -follow urinalysis  -follow ESR  -consulted Rheumatology  -continue trending CBC: elevated 15 65 on 2/18  -continue monitoring vital signs: abx discontinued at noon on 2/17, spiked fever at 101 6 later that night broken with tylenol- possibly gout etiology but consult ID  - temperature normal at 97 5 on 2/18  -initiate patient on colchicine      Fever  Assessment & Plan  · Began spiking fevers @1100 2/17   Denies any SOB, cough, chest pain, urinary complaints, abdominal pain, N/V/D  · Likely 2/2 to acute gout flare  · abx d/c @ 1200 today  · Had elevated lactate this am, trended down 3 0 ---> 1 0 with IVF  · Will repeat CXR in setting of elevated procal  · Obtain blood cultures   · Flu/RSV/COVID negative on admission  · Benign abdominal exam   · UA negative   · Monitor fever curve, trend procal    Obesity, Class III, BMI 40-49 9 (morbid obesity) ([facility] [address] )  Assessment & Plan  -patient is being evaluated for bariatric surgery    EVE (obstructive sleep apnea)  Assessment & Plan  -severe sleep apnea on BiPAP 25/20 at nighttime    Mild intermittent asthma without complication  Assessment & Plan  -currently no exacerbation will control home Singulair ease and albuterol p r n  Essential hypertension  Assessment & Plan  -blood pressure mildly elevated likely secondary to pain  -continue on amlodipine 10 mg  -will hold hydrochlorothiazide a in the view of suspicion having joint pain that could be related to gout  Come be restarted once uric acid is between normal limits  Plan  -decrease the dose of amlodipine to 5 mg and started the patient on losartan for his uricosuric effect  -continue holding hydrochlorothiazide  - blood pressure normal range at 122/80 on current plan             Subjective/Objective     Subjective:   Patient reports feeling more mobility since admission  He is able to sit up in bed with legs bent on the knee  He still feels swelling and pain but at a significantly reduced level  He denies any more fevers after the one last night  Objective:  Vitals: Blood pressure 122/80, pulse 101, temperature 97 5 °F (36 4 °C), temperature source Tympanic, resp  rate 16, height 5' 1" (1 549 m), weight 118 kg (259 lb 11 2 oz), SpO2 100 %  ,Body mass index is 49 07 kg/m²        Intake/Output Summary (Last 24 hours) at 2/18/2022 0823  Last data filed at 2/18/2022 7126  Gross per 24 hour   Intake 360 ml   Output --   Net 360 ml Invasive Devices  Report    Peripheral Intravenous Line            Peripheral IV 02/16/22 Left Antecubital 1 day    Peripheral IV 02/16/22 Left Wrist 1 day                Physical Exam:   Physical Exam  HENT:      Head: Normocephalic  Cardiovascular:      Rate and Rhythm: Normal rate and regular rhythm  Pulses: Normal pulses  Heart sounds: Normal heart sounds  Pulmonary:      Effort: Pulmonary effort is normal       Breath sounds: Normal breath sounds  Abdominal:      General: Bowel sounds are normal    Musculoskeletal:         General: Swelling present  Comments: Bilateral mark, no erythema, still swelling but looks reduced from initial presentation   Neurological:      Mental Status: He is alert           Lab, Imaging and other studies:    Fluid analysis is positive for monosodium urate acid crystals

## 2022-02-18 NOTE — ASSESSMENT & PLAN NOTE
-patient reports multiple joint pain that has been there for years especially on the uncles knee of risk and elbows  -reports that for the last 4 days the right knee has been a little bit more swollen than usual, extremely tender and it has increased temperature  No erythema  -denies any recent trauma  -no history of , septic arthritis, hemochromatosis, Lyme disease, tick bite, diabetes neuropathy, trauma, connective tissue disease, SLE, RA, vasculitis, no recent vital infection, no sick contacts  he never follow with Rheumatology for these multiple joint pain in the last few years  -patient does meet sepsis criteria with elevated white blood cell count, leukocytosis, tachycardia, elevated lactic acid  -patient is sexually active with the wife, denies any signs of  STD  -no history of alcohol or drug use   -no urinary complaints  -elevated CRP  -elevated lactic acid  -x-ray of the right knee unrevealing  Does have degenerative changes and supra patellar joint effusion  Plan  -continue IV antibiotics with vancomycin /ceftriaxone--> discontinue after arthrocentesis fluid analysis showed gout  -follow uric acid--> 10 6--> started patient on colchicine  Do not restart hydrochlorothiazide on discharge  -follow blood cultures--> negative  -continue monitor lactic acid--> normalize  -follow urinalysis--> no signs of infection  -follow ESR--> elevated  -consulted Rheumatology--> patient ray has a set up outpatient follow-up    The recommended started the patient on prednisone 40 mg and aspirin over days   -in the view of this patient having a low-grade fever overnight after discontinue antibiotics id input appreciated

## 2022-02-18 NOTE — DISCHARGE INSTR - AVS FIRST PAGE
Please follow-up with your primary care physician in regard this admission in the next week  Please follow-up with your rheumatologist, he have an appointment already schedule  Please continue taking your medication as indicated  Please follow the diet added on the instructions    Please come back to emergency department if her symptoms get worse  Please stop taking your hydrochlorothiazide/water pill

## 2022-02-18 NOTE — CONSULTS
Consultation - Infectious Disease   Pieter Prieto 46 y o  male MRN: [id number]  Unit/Bed#: -01 Encounter: [id number]    Extensive review of the medical records in Epic including review of the notes, radiographs, and laboratory results  IMPRESSION/RECOMMENDATIONS:   1  SIRS, POA:  With low-grade fever, tachycardia, leukocytosis  Source appears to be acute gouty arthritis  I do not suspect septic arthritis  Preliminary blood cultures are negative to date  Lactate is trending downward (3 0 -> 1 0)  Chest x-ray does not demonstrate focal infiltrates  Venous duplex is negative for DVT  Urinalysis is not suggestive of infection  Mild elevation of procalcitonin is noted but I do not suspect an acute bacterial infection  Temperature curve is improving and WBC is decreasing  Patient clinically does not appear septic or toxic   o Follow-up blood cultures  o Supportive care as per primary team  o Monitor clinical response, temperature curve off additional antibiotic therapy  2  Acute gouty arthritis:  Primarily affecting the patient's right knee with a lesser extent to his right ankle  Status post arthrocentesis on February 17th with monosodium urate crystals identified  Elevated uric acid of 10 6, ESR 28 and CRP of 19 6 are noted  Right knee x-ray demonstrates mild degenerative changes and suprapatellar effusion  o Continue colchicine and NSAIDs  o Okay to give steroids from ID standpoint  o Suggest outpatient follow-up with Rheumatology  o Supportive care and pain control as per primary service  3  Comorbidities noted including hypertension, obstructive sleep apnea and morbid obesity with BMI of 52  o Advised for dietary modification and increased cardiovascular exercise to allow for weight loss  o Further management and supportive care as per primary service    My recommendations were discussed with the patient in detail who verbalized understanding    My recommendations were discussed with medical resident Dr Parisa Herr, from the primary service  Thank you for allowing me to participate in the care of this patient  ID service will formally re-evaluate this patient on Monday if pt is still here  Please contact ID attending on call with questions this weekend as needed  HISTORY OF PRESENT ILLNESS:  Reason for Consult: fever, SIRS, septic arthritis? CC: RT knee pain  HPI: Anjana Nova is a 46y o  year old male with a PMH of asthma, morbid obesity, EVE who was admitted on February 16th with right knee pain  His symptoms began Saturday morning but worsened that night  The following morning he had difficulty walking  He was having difficulty stepping and bearing weight  He had also reported decreased range of motion particularly bending his knee  His RT knee also felt warm  He had subjective fever and sweats on Sunday; which is not unusual when he has flares of arthritis  Pt reports arthritis mostly in his RT knee and to a lesser extent in his RT ankle  He reports LT elbow arthritis a couple of weeks ago  He alternates between ibuprofen, acetaminophen and Aleve for pain control  Patient was admitted to the hospital for further evaluation with concerns of possible septic arthritis  He was treated empirically with vancomycin and ceftriaxone IV  X-ray of the right knee was done  Pt underwent RT knee arthrocentesis and crystal analysis demonstrated monosodium urate crystals  Patient has been treated with anti-inflammatories including colchicine and naproxen  Patient had fever yesterday evening and Infectious Disease service was consulted for further evaluation  REVIEW OF SYSTEMS:  Constitutional: +fever, chills  HENT: Negative runny nose, sore throat, congestion  Eyes: Negative for change in vision  Respiratory: Negative cough, shortness of breath  Cardiovascular: Negative for chest pain, palpitations    Gastrointestinal: Negative for abdominal pain, nausea, vomiting, diarrhea  Genitourinary:  Negative for dysuria, hematuria  Musculoskeletal: +arthritis, joint swelling, gait problem per HPI  Skin: Negative for rash, wound  Neurological: +occasional thigh numbness if he sits for too long  Negative for syncope, focal weakness  Hematological: Negative for excessive bruising, bleeding  Psychiatric/Behavioral: Negative for confusion, hallucination  PAST MEDICAL HISTORY:  Past Medical History:   Diagnosis Date    Ankle swelling     Arthritis     Asthma     Dizziness     Heartburn     Hypertension     Increased frequency of urination     Intolerance to heat     Joint pain     and joint swelling    Morbid obesity with BMI of 50 0-59 9, adult ([facility]) 12/16/2019    Sleep apnea     SOB (shortness of breath)      Past Surgical History:   Procedure Laterality Date    IR ASPIRATION JOINT (SPECIFY LOCATION)  2/16/2022     FAMILY HISTORY:  Family History   Problem Relation Age of Onset    Hypertension Mother     Throat cancer Father     No Known Problems Sister     No Known Problems Sister     Hypertension Sister     Diabetes Neg Hx     Heart disease Neg Hx     Stroke Neg Hx     Thyroid disease Neg Hx      SOCIAL HISTORY:  Social History   Social History     Substance and Sexual Activity   Alcohol Use Never     Social History     Substance and Sexual Activity   Drug Use No     Social History     Tobacco Use   Smoking Status Never Smoker   Smokeless Tobacco Never Used   Lives at home with his wife and kids  [place] to [place] 12 from [address] [place] [address] Dr he likely received BCG vaccine as a child  ALLERGIES:  No Known Allergies    MEDICATIONS:  All current active medications have been reviewed    Antibiotics: none, off antibiotics since 2/17  Scheduled Meds:  Current Facility-Administered Medications   Medication Dose Route Frequency Provider Last Rate    acetaminophen  650 mg Oral Q6H PRN Irving Johnston MD      albuterol  2 puff Inhalation Q6H PRN Irving Johnston MD      amLODIPine  5 mg Oral Daily Hector Prater MD      aspirin  81 mg Oral Daily Hector Prater MD      colchicine  0 6 mg Oral Daily Hector Prater MD      enoxaparin  40 mg Subcutaneous Daily Hector Prater MD      fluticasone  2 spray Nasal Daily Hector Prater MD      losartan  25 mg Oral Daily Hector Prater MD      montelukast  10 mg Oral HS Opal Shipley PA-C      multi-electrolyte  75 mL/hr Intravenous Continuous Hector Prater MD 75 mL/hr (22 1009)    naproxen  250 mg Oral BID With Meals Hector Prater MD       Continuous Infusions:multi-electrolyte, 75 mL/hr, Last Rate: 75 mL/hr (22 1009)      PRN Meds:   acetaminophen    albuterol     PHYSICAL EXAM:  Temp:  [97 5 °F (36 4 °C)-101 5 °F (38 6 °C)] 97 5 °F (36 4 °C)  HR:  [] 101  Resp:  [16-20] 16  BP: (122-136)/(76-89) 122/80  SpO2:  [95 %-100 %] 100 %  Temp (24hrs), Av 8 °F (37 7 °C), Min:97 5 °F (36 4 °C), Max:101 5 °F (38 6 °C)  Current: Temperature: 97 5 °F (36 4 °C)    Intake/Output Summary (Last 24 hours) at 2022 1027  Last data filed at 2022 4864  Gross per 24 hour   Intake 360 ml   Output --   Net 360 ml     General Appearance:  Appearing well, nontoxic, sitting up in bed, and in no acute distress   Head:  Normocephalic, without obvious abnormality, atraumatic   Eyes:  EOMI, Conjunctiva pink and sclera anicteric, both eyes   Nose: Nares normal, mucosa normal, no drainage   Throat: Oropharynx moist    Neck: Supple   Lungs:  Clear to auscultation bilaterally, respirations unlabored   Heart:   S1, S2, regular rate,rhythm, no murmur   Abdomen: Obese, soft, non-tender, non-distended   :  No Montesinos catheter present   Extremities:   Mild RT knee warmth, mild edema, limited range of motion of right knee in flexion, patient unable to bend beyond 90°, no tenderness palpation  No distal leg edema b/l   Skin: Healed scar LT anterior deltoid noted  No rashes or draining wounds noted  Neurologic: Alert and oriented x  3, conversant, fluent speech   Psychiatric: Calm, cooperative with exam and interview     LABS, IMAGING, & OTHER STUDIES:  Lab Results:  I have personally reviewed pertinent labs and cultures  Results from last 7 days   Lab Units 02/18/22  0605 02/17/22  0602 02/16/22  1223   WBC Thousand/uL 15 65* 15 57* 19 25*   HEMOGLOBIN g/dL 12 1 12 9 14 0   PLATELETS Thousands/uL 471* 472* 480*     Results from last 7 days   Lab Units 02/18/22  0605 02/17/22  0602 02/17/22  0602 02/16/22  1223 02/16/22  1223   SODIUM mmol/L 138  --  138  --  137   POTASSIUM mmol/L 3 4*   < > 3 2*   < > 3 3*   CHLORIDE mmol/L 98   < > 98   < > 98   CO2 mmol/L 29   < > 24   < > 26   BUN mg/dL 18   < > 16   < > 16   CREATININE mg/dL 1 19   < > 1 18   < > 1 30*   EGFR ml/min/1 73sq m 76   < > 77   < > 68   CALCIUM mg/dL 9 3   < > 9 5   < > 9 7   AST U/L  --   --   --   --  15   ALT U/L  --   --   --   --  20   ALK PHOS U/L  --   --   --   --  51 0    < > = values in this interval not displayed  Results from last 7 days   Lab Units 02/16/22  1617 02/16/22  1230 02/16/22  1223   BLOOD CULTURE   --  No Growth at 24 hrs  No Growth at 24 hrs  GRAM STAIN RESULT  1+ Polys  No organisms seen  --   --    BODY FLUID CULTURE, STERILE  No growth  --   --      Results from last 7 days   Lab Units 02/18/22  0605 02/17/22  0617 02/16/22  1223   PROCALCITONIN ng/ml 0 46* 0 29* 0 26*     Results from last 7 days   Lab Units 02/16/22  1223   CRP mg/dL 19 6*     Imaging Studies:   2/17 pCXR:  Lung fields are clear, no acute cardiopulmonary disease  2/16 venous duplex lower extremities:  No evidence of DVT  2/16 right knee x-ray, four view:  Minimal degenerative change, small suprapatellar joint effusion  I have personally reviewed pertinent imaging study reports and images in PACS

## 2022-02-18 NOTE — CASE MANAGEMENT
Case Management Discharge Planning Note    Patient name Richard Camacho  Location /-31 MRN [id number]  : 1980 Date 2022       Current Admission Date: 2022  Current Admission Diagnosis:SIRS secondary to gout   Patient Active Problem List    Diagnosis Date Noted    Fever 2022    SIRS secondary to gout 2022    Sprain of carpal joint of left wrist 2021    Obesity, Class III, BMI 40-49 9 (morbid obesity) ([facility] [address] ) 2021    Elevated pulse rate 2021    EVE (obstructive sleep apnea)     Mild intermittent asthma without complication     Essential hypertension 2019    Sleep apnea 2019    Allergic rhinitis 2019    Chronic pain of both ankles 2019      LOS (days): 2  Geometric Mean LOS (GMLOS) (days): 2 80  Days to GMLOS:0 9     OBJECTIVE:  Risk of Unplanned Readmission Score: 7         Current admission status: Inpatient   Preferred Pharmacy:   [facility] [address] [facility], [address] [place] [address] [address] [facility] [address]  Phone: [phone] Fax: [phone]    Primary Care Provider: KATARINA Higgins    Primary Insurance: Florentin PENNY  Secondary Insurance:     DISCHARGE DETAILS:    Discharge planning discussed with[de-identified] Patient and wife  Freedom of Choice: Yes  Comments - Freedom of Choice: Patient chose [employer] as home DME supplier  CM contacted family/caregiver?: Yes  Were Treatment Team discharge recommendations reviewed with patient/caregiver?: Yes  Did patient/caregiver verbalize understanding of patient care needs?: Yes  Were patient/caregiver advised of the risks associated with not following Treatment Team discharge recommendations?: Yes    Contacts  Patient Contacts: Jose Carlos Mares  Relationship to Patient[de-identified] Family  Contact Method:  In Person  Reason/Outcome: Continuity of [facility]         Is the patient interested in [address] at discharge?: No    DME Referral Provided  Referral made for DME?: Yes  DME referral completed for the following items[de-identified] Maeve Diaz  DME Supplier Name[de-identified] [employer] (DELIVERED BEDSIDE )    Other Referral/Resources/Interventions Provided:  Interventions: DME  Referral Comments: RW ORDERED VIA [place]      Would you like to participate in our [address] service program?  : No - Declined    Treatment Team Recommendation: Home  Discharge Destination Plan[de-identified] Home  Transport at Discharge : Drucella Peabody with designated   ETA of Transport (Date): 02/18/22  ETA of Transport (Time): 8208

## 2022-02-18 NOTE — QUICK NOTE
Notified by RN patient with 101 5 fever  Per chart review, pt began spiking fevers @ 1100 2/17  Patient evaluated bedside  Denies SOB, cough, chest pain, urinary complaints, abdominal pain, N/V/D  Physical exam without abnormalities  Likely due to acute gout flare  Of note had abx d c @ 1200 today  Patient with elevated lactate this am 3 0 --> 1 0 with IVF  Mildly elevated procal 0 26 --> 0 29  Will repeat CXR and blood cultures  UA this afternoon negative  Flu/RSV/COVID negative  Monitor fever curve, procal  Supportive measures  Can consider restarting abx based on CXR  Addendum: CXR without consolidation on my read  Hold off on abx  Fever broke with Tylenol and ice packing  Continue supportive care and gout management

## 2022-02-18 NOTE — DISCHARGE SUMMARY
Terrence U  68   Discharge- Pieter Vo 1980, 46 y o  male MRN: [id number]  Unit/Bed#: -01 Encounter: [id number]  Primary Care Provider: KATARINA Enriquez   Date and time admitted to hospital: 2/16/2022 11:19 AM    * SIRS secondary to gout  Assessment & Plan  -patient reports multiple joint pain that has been there for years especially on the uncles knee of risk and elbows  -reports that for the last 4 days the right knee has been a little bit more swollen than usual, extremely tender and it has increased temperature  No erythema  -denies any recent trauma  -no history of , septic arthritis, hemochromatosis, Lyme disease, tick bite, diabetes neuropathy, trauma, connective tissue disease, SLE, RA, vasculitis, no recent vital infection, no sick contacts  he never follow with Rheumatology for these multiple joint pain in the last few years  -patient does meet sepsis criteria with elevated white blood cell count, leukocytosis, tachycardia, elevated lactic acid  -patient is sexually active with the wife, denies any signs of  STD  -no history of alcohol or drug use   -no urinary complaints  -elevated CRP  -elevated lactic acid  -x-ray of the right knee unrevealing  Does have degenerative changes and supra patellar joint effusion  Plan  -continue IV antibiotics with vancomycin /ceftriaxone--> discontinue after arthrocentesis fluid analysis showed gout  -follow uric acid--> 10 6--> started patient on colchicine  Do not restart hydrochlorothiazide on discharge  -follow blood cultures--> negative  -continue monitor lactic acid--> normalize  -follow urinalysis--> no signs of infection  -follow ESR--> elevated  -consulted Rheumatology--> patient ray has a set up outpatient follow-up    The recommended started the patient on prednisone 40 mg and aspirin over days   -in the view of this patient having a low-grade fever overnight after discontinue antibiotics id input appreciated    Fever  Assessment & Plan  · Began spiking fevers @1100 2/17  Denies any SOB, cough, chest pain, urinary complaints, abdominal pain, N/V/D  · Likely 2/2 to acute gout flare  · abx d/c @ 1200 today  · Had elevated lactate this am, trended down 3 0 ---> 1 0 with IVF  · Will repeat CXR in setting of elevated procal  · Obtain blood cultures --> negative  · Flu/RSV/COVID negative on admission  · Benign abdominal exam   · UA negative   · Monitor fever curve, trend procal  · ID on board    Obesity, Class III, BMI 40-49 9 (morbid obesity) ([facility] [address] )  Assessment & Plan  -patient is being evaluated for bariatric surgery    EVE (obstructive sleep apnea)  Assessment & Plan  -severe sleep apnea on BiPAP 25/20 at nighttime    Mild intermittent asthma without complication  Assessment & Plan  -currently no exacerbation will control home Singulair ease and albuterol p r n  Essential hypertension  Assessment & Plan  -blood pressure mildly elevated likely secondary to pain  -continue on amlodipine 10 mg  -will hold hydrochlorothiazide a in the view of suspicion having joint pain that could be related to gout  Come be restarted once uric acid is between normal limits  Plan  -decrease the dose of amlodipine to 5 mg and started the patient on losartan for his uricosuric effect  -continue holding hydrochlorothiazide  - blood pressure normal range at 122/80 on current plan         Discharging Resident Physician: Onofre Gutierrez MD  Attending: Katie Ladd MD  PCP: Екатерина White, [address] [place] [address]  Admission Date: 2/16/2022  Discharge Date: 02/18/22    Disposition:     Home    Hospital Course: Anjana Nova is a 46 y o  male patient with a past medical history of severe EVE and my asthma who originally presented to the hospital on 2/16/2022 due to right knee pain    Patient stated having joint pain for years but 4 days before admission the right knee started getting swollen and more painful than usual   Patient underwent arthrocentesis that showed he has gout disease  He was placed on colchicine, rheumatology was consulted recommended outpatient follow-up  His hydrochlorothiazide will be discontinue  His blood pressure medication was adjusted and losartan was included due to the uricosuric effect  Septic arthritis was ruled out, id team was on board  Fevers and white blood cell whether most likely related to this patient acute gout attack  Venous Doppler were negative for any kind of DVT  Initially patient presented with elevated lactic acid the resolved with IV fluids  Urine and blood cultures were negative  No signs of bacteria growing on the synovial fluids either  Patient will be discharged on prednisone tapering dose, colchicine and follow-up with Rheumatology  Condition at Discharge: good     Discharge Day Visit / Exam:     Subjective:  No acute overnight events  Patient pain improved after initiation of colchicine  Vitals: Blood Pressure: 122/80 (02/18/22 0813)  Pulse: 101 (02/18/22 0813)  Temperature: 97 5 °F (36 4 °C) (02/18/22 0813)  Temp Source: Tympanic (02/18/22 0813)  Respirations: 16 (02/18/22 0813)  Height: 5' 1" (154 9 cm) (02/16/22 1520)  Weight - Scale: 118 kg (259 lb 11 2 oz) (02/16/22 1520)  SpO2: 100 % (02/18/22 0813)  Exam:   Physical Exam  Constitutional:       General: He is not in acute distress  Appearance: He is obese  HENT:      Head: Normocephalic and atraumatic  Cardiovascular:      Rate and Rhythm: Normal rate and regular rhythm  Pulses: Normal pulses  Heart sounds: Normal heart sounds  Pulmonary:      Effort: Pulmonary effort is normal       Breath sounds: Normal breath sounds  Abdominal:      General: Abdomen is flat  Bowel sounds are normal       Palpations: Abdomen is soft  Musculoskeletal:         General: Normal range of motion  Cervical back: Normal range of motion  Skin:     General: Skin is warm        Capillary Refill: Capillary refill takes less than 2 seconds  Neurological:      General: No focal deficit present  Mental Status: He is alert and oriented to person, place, and time  Mental status is at baseline  Psychiatric:         Mood and Affect: Mood normal          Discharge instructions/Information to patient and family:   See after visit summary for information provided to patient and family  Provisions for Follow-Up Care:  See after visit summary for information related to follow-up care and any pertinent home health orders  Discharge Medications:  See after visit summary for reconciled discharge medications provided to patient and family        ** Please Note: This note has been constructed using a voice recognition system **

## 2022-02-18 NOTE — DISCHARGE INSTRUCTIONS
Weight Management   AMBULATORY CARE:   Why it is important to manage your weight:  Being overweight increases your risk of health conditions such as heart disease, high blood pressure, type 2 diabetes, and certain types of cancer  It can also increase your risk for osteoarthritis, sleep apnea, and other respiratory problems  Aim for a slow, steady weight loss  Even a small amount of weight loss can lower your risk of health problems  How to lose weight safely:  A safe and healthy way to lose weight is to eat fewer calories and get regular exercise  · You can lose up about 1 pound a week by decreasing the number of calories you eat by 500 calories each day  You can decrease calories by eating smaller portion sizes or by cutting out high-calorie foods  Read labels to find out how many calories are in the foods you eat  · You can also burn calories with exercise such as walking, swimming, or biking  You will be more likely to keep weight off if you make these changes part of your lifestyle  Exercise at least 30 minutes per day on most days of the week  You can also fit in more physical activity by taking the stairs instead of the elevator or parking farther away from stores  Ask your healthcare provider about the best exercise plan for you  Healthy meal plan for weight management:  A healthy meal plan includes a variety of foods, contains fewer calories, and helps you stay healthy  A healthy meal plan includes the following:     · Eat whole-grain foods more often  A healthy meal plan should contain fiber  Fiber is the part of grains, fruits, and vegetables that is not broken down by your body  Whole-grain foods are healthy and provide extra fiber in your diet  Some examples of whole-grain foods are whole-wheat breads and pastas, oatmeal, brown rice, and bulgur      · Eat a variety of vegetables every day   Include dark, leafy greens such as spinach, kale, kimani greens, and mustard greens  Eat yellow and orange vegetables such as carrots, sweet potatoes, and winter squash  · Eat a variety of fruits every day  Choose fresh or canned fruit (canned in its own juice or light syrup) instead of juice  Fruit juice has very little or no fiber  · Eat low-fat dairy foods  Drink fat-free (skim) milk or 1% milk  Eat fat-free yogurt and low-fat cottage cheese  Try low-fat cheeses such as mozzarella and other reduced-fat cheeses  · Choose meat and other protein foods that are low in fat  Choose beans or other legumes such as split peas or lentils  Choose fish, skinless poultry (chicken or turkey), or lean cuts of red meat (beef or pork)  Before you cook meat or poultry, cut off any visible fat  · Use less fat and oil  Try baking foods instead of frying them  Add less fat, such as margarine, sour cream, regular salad dressing and mayonnaise to foods  Eat fewer high-fat foods  Some examples of high-fat foods include french fries, doughnuts, ice cream, and cakes  · Eat fewer sweets  Limit foods and drinks that are high in sugar  This includes candy, cookies, regular soda, and sweetened drinks  Ways to decrease calories:   · Eat smaller portions  ? Use a small plate with smaller servings  ? Do not eat second helpings  ? When you eat at a restaurant, ask for a box and place half of your meal in the box before you eat  ? Share an entrée with someone else  · Replace high-calorie snacks with healthy, low-calorie snacks  ? Choose fresh fruit, vegetables, fat-free rice cakes, or air-popped popcorn instead of potato chips, nuts, or chocolate  ? Choose water or calorie-free drinks instead of soda or sweetened drinks  · Do not shop for groceries when you are hungry  You may be more likely to make unhealthy food choices   Take a grocery list of healthy foods and shop after you have eaten     · Eat regular meals  Do not skip meals  Skipping meals can lead to overeating later in the day  This can make it harder for you to lose weight  Eat a healthy snack in place of a meal if you do not have time to eat a regular meal  Talk with a dietitian to help you create a meal plan and schedule that is right for you  Other things to consider as you try to lose weight:   · Be aware of situations that may give you the urge to overeat, such as eating while watching television  Find ways to avoid these situations  For example, read a book, go for a walk, or do crafts  · Meet with a weight loss support group or friends who are also trying to lose weight  This may help you stay motivated to continue working on your weight loss goals  © Copyright [employer] 2021 Information is for End User's use only and may not be sold, redistributed or otherwise used for commercial purposes  All illustrations and images included in CareNotes® are the copyrighted property of A D A M , Inc  or [facility] Evelyn suhas   The above information is an  only  It is not intended as medical advice for individual conditions or treatments  Talk to your doctor, nurse or pharmacist before following any medical regimen to see if it is safe and effective for you  After you go Home:    Home care  These tips can help your wound heal:   The wound may drain for the first 2 days  Cover the wound with a clean dry dressing  Change the dressing if it becomes soaked with blood or pus   If a gauze packing was placed inside the abscess pocket, you may be told to remove it yourself  You may do this in the shower  Once the packing is removed, you should wash the area in the shower, or clean the area as directed by your provider  Continue to do this until the skin opening has closed  Make sure you wash your hands after changing the packing or cleaning the wound     If you were prescribed antibiotics, take them as directed until they are all gone   You may use acetaminophen or ibuprofen to control pain, unless another pain medicine was prescribed  If you have liver disease or ever had a stomach ulcer, talk with your doctor before using these medicines  Follow-up care  Follow up with your healthcare provider, or as advised  If a gauze packing was put in your wound, it should be removed in 1 to 2 days  Check your wound every day for any signs that the infection is getting worse  The signs are listed below  When to seek medical advice  Call your healthcare provider right away if any of these occur:   Increasing redness or swelling   Red streaks in the skin leading away from the wound   Increasing local pain or swelling   Continued pus draining from the wound 2 days after treatment   Fever of 100 4ºF (38ºC) or higher, or as directed by your healthcare provider   Abscess  returns when you are at home             Gout   WHAT YOU NEED TO KNOW:   Gout is a form of arthritis that causes severe joint pain, redness, swelling, and stiffness  Acute gout pain starts suddenly, gets worse quickly, and stops on its own  Acute gout can become chronic and cause permanent damage to the joints  DISCHARGE INSTRUCTIONS:   Return to the emergency department if:   You have severe pain in one or more of your joints that you cannot tolerate  You have a fever or redness that spreads beyond the joint area  Call your doctor if:   You have new symptoms, such as a rash, after you start gout treatment  Your joint pain and swelling do not go away, even after treatment  You are not urinating as much or as often as you usually do  You have trouble taking your gout medicines  You have questions or concerns about your condition or care  Medicines: You may need any of the following:  Prescription pain medicine  may be given  Ask your healthcare provider how to take this medicine safely   Some prescription pain medicines contain acetaminophen  Do not take other medicines that contain acetaminophen without talking to your healthcare provider  Too much acetaminophen may cause liver damage  Prescription pain medicine may cause constipation  Ask your healthcare provider how to prevent or treat constipation  NSAIDs , such as ibuprofen, help decrease swelling, pain, and fever  This medicine is available with or without a doctor's order  NSAIDs can cause stomach bleeding or kidney problems in certain people  If you take blood thinner medicine, always ask your healthcare provider if NSAIDs are safe for you  Always read the medicine label and follow directions  Gout medicine  decreases joint pain and swelling  It may also be given to prevent new gout attacks  Steroids  reduce inflammation and can help your joint stiffness and pain during gout attacks  Uric acid medicine  may be given to reduce the amount of uric acid your body makes  Some medicines may help you pass more uric acid when you urinate  Take your medicine as directed  Contact your healthcare provider if you think your medicine is not helping or if you have side effects  Tell him or her if you are allergic to any medicine  Keep a list of the medicines, vitamins, and herbs you take  Include the amounts, and when and why you take them  Bring the list or the pill bottles to follow-up visits  Carry your medicine list with you in case of an emergency  Manage your symptoms:       Rest your painful joint so it can heal   Your healthcare provider may recommend crutches or a walker if the affected joint is in a leg  Apply ice to your joint  Ice decreases pain and swelling  Use an ice pack, or put crushed ice in a plastic bag  Cover the ice pack or bag with a towel before you apply it to your painful joint  Apply ice for 15 to 20 minutes every hour, or as directed  Elevate your joint  Elevation helps reduce swelling and pain   Raise your joint above the level of your heart as often as you can  Prop your painful joint on pillows to keep it above your heart comfortably  Go to physical therapy if directed  A physical therapist can teach you exercises to improve flexibility and range of motion  Help prevent gout attacks:   Do not eat high-purine foods  These foods include meats, seafood, asparagus, spinach, cauliflower, and some types of beans  Healthcare providers may tell you to eat more low-fat milk products, such as yogurt  Milk products may decrease your risk for gout attacks  Vitamin C and coffee may also help  Your healthcare provider or dietitian can help you create a meal plan  Drink liquids as directed  Liquids such as water help remove uric acid from your body  Ask how much liquid to drink each day and which liquids are best for you  Maintain a healthy weight  Weight loss may decrease the amount of uric acid in your body  Ask your healthcare provider what a healthy weight is for you  Ask him or her to help you create a weight loss plan if you are overweight  Control your blood sugar level if you have diabetes  Keep your blood sugar level in a normal range  This can help prevent gout attacks  Limit or do not drink alcohol as directed  Alcohol can trigger a gout attack  Alcohol also increases your risk for dehydration  Ask your healthcare provider if alcohol is safe for you  Follow up with your doctor as directed: You may be referred to a rheumatologist or podiatrist  Write down your questions so you remember to ask them during your visits  © Copyright [employer] 2021 Information is for End User's use only and may not be sold, redistributed or otherwise used for commercial purposes  All illustrations and images included in CareNotes® are the copyrighted property of [employer]  or Manasa Schulz   The above information is an  only  It is not intended as medical advice for individual conditions or treatments  Talk to your doctor, nurse or pharmacist before following any medical regimen to see if it is safe and effective for you        Gout   AMBULATORY CARE:   Gout  is a form of arthritis that causes severe joint pain and stiffness  Acute gout pain starts suddenly, gets worse quickly, and stops on its own  Acute gout can become chronic and cause permanent damage to your joints  Seek care immediately if:   You have severe pain in one or more of your joints that you cannot tolerate  You have a fever or redness that spreads beyond the joint area  Call your doctor if:   You have new symptoms, such as a rash, after you start gout treatment  Your joint pain and swelling do not go away, even after treatment  You are not urinating as much or as often as you usually do  You have trouble taking your gout medicines  You have questions or concerns about your condition or care  Stages of gout:   Hyperuricemia  starts with high levels of uric acid  Hyperuricemia is not gout, but it increases your risk for gout  You may have no symptoms at this stage, and it usually does not need treatment  Acute gouty arthritis  starts with a sudden attack of pain and swelling, usually in 1 joint  The attack may last from a few days to 2 weeks  Intercritical gout  is the time between attacks  You may go months or years without another attack  You will not have joint pain or stiffness, but this does not mean your gout is cured  You will still need treatment to prevent chronic gout  Chronic tophaceous gout  develops if gout is not treated  Large amounts of uric acid crystals, called tophi, collect around your joints  The crystals can destroy or deform the joints  Gout attacks occur more often, and last hours to weeks  More than 1 joint may be painful and swollen  At this stage, gout symptoms do not go away on their own  Medicines: You may need any of the following:  Prescription pain medicine  may be given   Ask your healthcare provider how to take this medicine safely  Some prescription pain medicines contain acetaminophen  Do not take other medicines that contain acetaminophen without talking to your healthcare provider  Too much acetaminophen may cause liver damage  Prescription pain medicine may cause constipation  Ask your healthcare provider how to prevent or treat constipation  NSAIDs , such as ibuprofen, help decrease swelling, pain, and fever  This medicine is available with or without a doctor's order  NSAIDs can cause stomach bleeding or kidney problems in certain people  If you take blood thinner medicine, always ask your healthcare provider if NSAIDs are safe for you  Always read the medicine label and follow directions  Gout medicine  decreases joint pain and swelling  It may also be given to prevent new gout attacks  Steroids  reduce inflammation and can help your joint stiffness and pain during gout attacks  Uric acid medicine  may be given to reduce the amount of uric acid your body makes  Some medicines may help you pass more uric acid when you urinate  Take your medicine as directed  Contact your healthcare provider if you think your medicine is not helping or if you have side effects  Tell him or her if you are allergic to any medicine  Keep a list of the medicines, vitamins, and herbs you take  Include the amounts, and when and why you take them  Bring the list or the pill bottles to follow-up visits  Carry your medicine list with you in case of an emergency  Manage your symptoms:       Rest your painful joint so it can heal   Your healthcare provider may recommend crutches or a walker if the affected joint is in a leg  Apply ice to your joint  Ice decreases pain and swelling  Use an ice pack, or put crushed ice in a plastic bag  Cover the ice pack or bag with a towel before you apply it to your painful joint  Apply ice for 15 to 20 minutes every hour, or as directed      Elevate your joint  Elevation helps reduce swelling and pain  Raise your joint above the level of your heart as often as you can  Prop your painful joint on pillows to keep it above your heart comfortably  Go to physical therapy if directed  A physical therapist can teach you exercises to improve flexibility and range of motion  Help prevent gout attacks:   Do not eat high-purine foods  These foods include meats, seafood, asparagus, spinach, cauliflower, and some types of beans  Healthcare providers may tell you to eat more low-fat milk products, such as yogurt  Milk products may decrease your risk for gout attacks  Vitamin C and coffee may also help  Your healthcare provider or dietitian can help you create a meal plan  Drink liquids as directed  Liquids such as water help remove uric acid from your body  Ask how much liquid to drink each day and which liquids are best for you  Maintain a healthy weight  Weight loss may decrease the amount of uric acid in your body  Ask your healthcare provider what a healthy weight is for you  Ask him or her to help you create a weight loss plan if you are overweight  Control your blood sugar level if you have diabetes  Keep your blood sugar level in a normal range  This can help prevent gout attacks  Limit or do not drink alcohol as directed  Alcohol can trigger a gout attack  Alcohol also increases your risk for dehydration  Ask your healthcare provider if alcohol is safe for you  Follow up with your doctor as directed: You may be referred to a rheumatologist or podiatrist  Write down your questions so you remember to ask them during your visits  © [employer] 2021 Information is for End User's use only and may not be sold, redistributed or otherwise used for commercial purposes  All illustrations and images included in CareNotes® are the copyrighted property of [employer]  or Manasa Schulz   The above information is an  only  It is not intended as medical advice for individual conditions or treatments  Talk to your doctor, nurse or pharmacist before following any medical regimen to see if it is safe and effective for you        Gout   WHAT YOU NEED TO KNOW:   Gout is a form of arthritis that causes severe joint pain, redness, swelling, and stiffness  Acute gout pain starts suddenly, gets worse quickly, and stops on its own  Acute gout can become chronic and cause permanent damage to the joints  DISCHARGE INSTRUCTIONS:   Seek care immediately if:   You have severe pain in one or more of your joints that you cannot tolerate  You have a fever or redness that spreads beyond the joint area  Contact your healthcare provider if:   You have new symptoms, such as a rash, after you start gout treatment  Your joint pain and swelling do not go away, even after treatment  You are not urinating as much or as often as you usually do  You have trouble taking your gout medicines  You have questions or concerns about your condition or care  Medicines: You may need any of the following:  Prescription pain medicine  may be given  Ask your healthcare provider how to take this medicine safely  Some prescription pain medicines contain acetaminophen  Do not take other medicines that contain acetaminophen without talking to your healthcare provider  Too much acetaminophen may cause liver damage  Prescription pain medicine may cause constipation  Ask your healthcare provider how to prevent or treat constipation  NSAIDs , such as ibuprofen, help decrease swelling, pain, and fever  This medicine is available with or without a doctor's order  NSAIDs can cause stomach bleeding or kidney problems in certain people  If you take blood thinner medicine, always ask your healthcare provider if NSAIDs are safe for you  Always read the medicine label and follow directions  Gout medicine  decreases joint pain and swelling   It may also be given to prevent new gout attacks  Steroids  reduce inflammation and can help your joint stiffness and pain during gout attacks  Uric acid medicine  may be given to reduce the amount of uric acid your body makes  Some medicines may help you pass more uric acid when you urinate  Take your medicine as directed  Contact your healthcare provider if you think your medicine is not helping or if you have side effects  Tell him or her if you are allergic to any medicine  Keep a list of the medicines, vitamins, and herbs you take  Include the amounts, and when and why you take them  Bring the list or the pill bottles to follow-up visits  Carry your medicine list with you in case of an emergency  Follow up with your healthcare provider as directed:  Write down your questions so you remember to ask them during your visits  Manage gout:   Rest your painful joint so it can heal   Your healthcare provider may recommend crutches or a walker if the affected joint is in a leg  Apply ice to your joint  Ice decreases pain and swelling  Use an ice pack, or put crushed ice in a plastic bag  Cover the ice pack or bag with a towel before you apply it to your painful joint  Apply ice for 15 to 20 minutes every hour, or as directed  Elevate your joint  Elevation helps reduce swelling and pain  Raise your joint above the level of your heart as often as you can  Prop your painful joint on pillows to keep it above your heart comfortably  Go to physical therapy if directed  A physical therapist can teach you exercises to improve flexibility and range of motion  Prevent gout attacks:   Do not eat high-purine foods  These foods include meats, seafood, asparagus, spinach, cauliflower, and some types of beans  Healthcare providers may tell you to eat more low-fat milk products, such as yogurt  Milk products may decrease your risk for gout attacks  Vitamin C and coffee may also help   Your healthcare provider or dietitian can help you create a meal plan  Drink more liquids as directed  Liquids such as water help remove uric acid from your body  Ask how much liquid to drink each day and which liquids are best for you  Manage your weight  Weight loss may decrease the amount of uric acid in your body  Exercise can help you lose weight  Talk to your healthcare provider about the best exercises for you  Control your blood sugar level if you have diabetes  Keep your blood sugar level in a normal range  This can help prevent gout attacks  Limit or do not drink alcohol as directed  Alcohol can trigger a gout attack  Alcohol also increases your risk for dehydration  Ask your healthcare provider if alcohol is safe for you  © Copyright [employer] 2018 Information is for End User's use only and may not be sold, redistributed or otherwise used for commercial purposes  All illustrations and images included in CareNotes® are the copyrighted property of [employer]  or Manasa Arenas  The above information is an  only  It is not intended as medical advice for individual conditions or treatments  Talk to your doctor, nurse or pharmacist before following any medical regimen to see if it is safe and effective for you    Jackelyn Stevenson

## 2022-02-18 NOTE — ASSESSMENT & PLAN NOTE
-blood pressure mildly elevated likely secondary to pain  -continue on amlodipine 10 mg  -will hold hydrochlorothiazide a in the view of suspicion having joint pain that could be related to gout  Come be restarted once uric acid is between normal limits    Plan  -decrease the dose of amlodipine to 5 mg and started the patient on losartan for his uricosuric effect  -continue holding hydrochlorothiazide  - blood pressure normal range at 122/80 on current plan

## 2022-02-19 LAB
BACTERIA SPEC BFLD CULT: NO GROWTH
GRAM STN SPEC: NORMAL
GRAM STN SPEC: NORMAL

## 2022-02-21 ENCOUNTER — TRANSITIONAL CARE MANAGEMENT (OUTPATIENT)
Dept: FAMILY MEDICINE CLINIC | Facility: CLINIC | Age: 42
End: 2022-02-21

## 2022-02-21 LAB
BACTERIA BLD CULT: NORMAL
BACTERIA BLD CULT: NORMAL

## 2022-02-21 NOTE — UTILIZATION REVIEW
Notification of Discharge   This is a Notification of Discharge from our facility [address]  Please be advised that this patient has been discharge from our facility  Below you will find the admission and discharge date and time including the patients disposition  UTILIZATION REVIEW CONTACT:  [address] [place]  Utilization   Network Utilization Review Department  Phone: [phone] x carefully listen to the prompts  All voicemails are confidential   Email: [email or web address]     PHYSICIAN ADVISORY SERVICES:  FOR UJFZ-EX-GVFA REVIEW - MEDICAL NECESSITY DENIAL  Phone: [phone]  Fax: [phone]  Email: [email or web address]     PRESENTATION DATE: 2/16/2022 11:19 AM  OBERVATION ADMISSION DATE:   INPATIENT ADMISSION DATE: 2/16/22  2:41 PM   DISCHARGE DATE: 2/18/2022  1:43 PM  DISPOSITION: Home/Self Care Home/Self Care      IMPORTANT INFORMATION:  Send all requests for admission clinical reviews, approved or denied determinations and any other requests to dedicated fax number below belonging to the campus where the patient is receiving treatment   List of dedicated fax numbers:  [address] [place] [address] DENIALS (Administrative/Medical Necessity) [phone]   [address] [place] [address] (Maternity/NICU/Pediatrics) [phone]   [facility] [phone]   [address] [facility] [phone]   [facility] [place]  [phone]   [address] [place] [address] [address] [place] [address],[address] [phone]   [facility]  [phone]   [address] [place], [place] [address] [facility] [place] [address] [facility] [phone]

## 2022-02-22 NOTE — PROGRESS NOTES
Assessment and Plan: Phylliss Leventhal is a 46 y o   male who presents as a Rheumatology consult referred by No ref  provider found for evaluation of gout    Patient had joint pain in left elbow, right knee, bilateral ankle  Patient had left knee arthrocentesis and revealed monosodium urate crystals  Joint pain is likely due to gout  Start allopurinol 100mg daily  Continue colchicine daily  Take prednisone 30mg daily for 3 days, then 20mg daily for 3 days, then 10mg daily for 3 days, then 5mg daily for 4 days  Call with any gout flare  Do labs in 1 month    Plan:  Diagnoses and all orders for this visit:    Chronic gout without tophus, unspecified cause, unspecified site  -     allopurinol (ZYLOPRIM) 100 mg tablet; Take 1 tablet (100 mg total) by mouth daily  -     Basic metabolic panel  -     Uric acid  -     Sedimentation rate, automated  -     C-reactive protein    SIRS secondary to gout  -     colchicine (COLCRYS) 0 6 mg tablet; Take 1 tablet (0 6 mg total) by mouth daily  -     predniSONE 10 mg tablet; Take 3 tablets (30 mg total) by mouth daily for 3 days, THEN 2 tablets (20 mg total) daily for 3 days, THEN 1 tablet (10 mg total) daily for 3 days, THEN 0 5 tablets (5 mg total) daily for 4 days  Follow-up plan: Return to clinic in 4 months        HPI  Phylliss Leventhal is a 46 y o   male who presents as a Rheumatology consult referred by [facility] for evaluation of gout  Patient has been had ankle pain for about 20 years, pain usually lasts about 1 day  Patient has pain 2-3 time a year  Patient was seen by foot and ankle surgeon and they did not know the pathology  Patient was managed with naproxen  In the past 6 months, patient has joint pain in left elbow and right knee  Patient had severe ankle and right knee pain, 10/10 in intensity on 2/14/2020  Patient took a rest at bed for 2 days, but the pain did not improve  Patient went to [facility] [employer]   Had right knee joint arthrocentesis and revealed monosodium urate crystals  Uric acid was 10 6  Patient was discharged with prednisone taper and colchicine  Currently pain has improved  Patient feels joint tight  Patient had once time of hand swelling in the past  No morning stiffness  Denies photosensitivity, rashes, psoriasis, sicca symptoms, oral or nasal ulcers, alopecia, Raynaud's  Patient does not drink or smoke  Patient said that tilapia bring the pain  Patient has gained 30 lbs in the past 3 months  Review of Systems  Review of Systems   Constitutional: Negative for chills and fever  HENT: Negative for congestion, rhinorrhea, sneezing and sore throat  Dry mouth   Eyes: Negative for pain and discharge  Respiratory: Negative for cough, chest tightness, shortness of breath and wheezing  Cardiovascular: Negative for chest pain and leg swelling  Gastrointestinal: Negative for abdominal pain, nausea and vomiting  Endocrine: Negative for polydipsia, polyphagia and polyuria  Genitourinary: Negative for flank pain, frequency and urgency  Musculoskeletal: Positive for arthralgias  Negative for back pain and joint swelling  Skin: Negative for color change and pallor  Neurological: Negative for dizziness, weakness, light-headedness and headaches  Psychiatric/Behavioral: Negative for agitation and confusion  Reviewed and agree      Allergies  No Known Allergies    Home Medications    Current Outpatient Medications:     albuterol (PROVENTIL HFA,VENTOLIN HFA) 90 mcg/act inhaler, INHALE 2 PUFFS BY MOUTH EVERY 6 HOURS AS NEEDED FOR WHEEZING, Disp: 8 5 g, Rfl: 0    amLODIPine (NORVASC) 10 mg tablet, TAKE 1 TABLET(10 MG) BY MOUTH DAILY, Disp: 30 tablet, Rfl: 0    aspirin (ECOTRIN LOW STRENGTH) 81 mg EC tablet, Take 1 tablet (81 mg total) by mouth daily, Disp: 90 tablet, Rfl: 1    colchicine (COLCRYS) 0 6 mg tablet, Take 1 tablet (0 6 mg total) by mouth daily, Disp: 30 tablet, Rfl: 6    losartan (COZAAR) 25 mg tablet, Take 1 tablet (25 mg total) by mouth daily, Disp: 30 tablet, Rfl: 0    montelukast (SINGULAIR) 10 mg tablet, TAKE 1 TABLET(10 MG) BY MOUTH DAILY AT BEDTIME, Disp: 30 tablet, Rfl: 0    predniSONE 10 mg tablet, Take 3 tablets (30 mg total) by mouth daily for 3 days, THEN 2 tablets (20 mg total) daily for 3 days, THEN 1 tablet (10 mg total) daily for 3 days, THEN 0 5 tablets (5 mg total) daily for 4 days  , Disp: 20 tablet, Rfl: 0    allopurinol (ZYLOPRIM) 100 mg tablet, Take 1 tablet (100 mg total) by mouth daily, Disp: 30 tablet, Rfl: 6    fluticasone (FLONASE) 50 mcg/act nasal spray, 2 sprays into each nostril daily (Patient not taking: Reported on 2/16/2022 ), Disp: 1 Bottle, Rfl: 11    Past Medical History  Past Medical History:   Diagnosis Date    Ankle swelling     Arthritis     Asthma     Dizziness     Heartburn     Hypertension     Increased frequency of urination     Intolerance to heat     Joint pain     and joint swelling    [facility] with [facility] 50 0-59 9[facility]    Sleep apnea     SOB (shortness of breath)        Past Surgical History   Past Surgical History:   Procedure Laterality Date    IR ASPIRATION JOINT (SPECIFY LOCATION)  2/16/2022       Family History    Family History   Problem Relation Age of Onset    Hypertension Mother     Throat cancer Father     No Known Problems Sister     No Known Problems Sister     Hypertension Sister     Diabetes Neg Hx     Heart disease Neg Hx     Stroke Neg Hx     Thyroid disease Neg Hx      No known family history of autoimmune or inflammatory diseases      Social History  Occupation: nail polish worker  Social History     Substance and Sexual Activity   Alcohol Use Never     Social History     Substance and Sexual Activity   Drug Use No     Social History     Tobacco Use   Smoking Status Former Smoker   Smokeless Tobacco Never Used       Objective:  Vitals:    02/23/22 1439   BP: 152/98   Pulse: (!) 112       Physical Exam  Constitutional:       General: He is not in acute distress  Appearance: He is well-developed  He is obese  He is not diaphoretic  HENT:      Head: Normocephalic  Mouth/Throat:      Pharynx: No oropharyngeal exudate  Eyes:      Pupils: Pupils are equal, round, and reactive to light  Cardiovascular:      Rate and Rhythm: Normal rate and regular rhythm  Heart sounds: No murmur heard  Pulmonary:      Effort: Pulmonary effort is normal  No respiratory distress  Breath sounds: No wheezing or rales  Abdominal:      General: Bowel sounds are normal       Palpations: Abdomen is soft  Tenderness: There is no abdominal tenderness  Musculoskeletal:         General: Tenderness (left ankle) present  Normal range of motion  Cervical back: Normal range of motion  Comments: Limping on ambulation noted   Skin:     General: Skin is warm  Neurological:      Mental Status: He is alert and oriented to person, place, and time  Reviewed labs and imaging  Imaging:   XR chest portable 2/18/2022  Impression: No acute cardiopulmonary disease  XR knee 4+ vw right injury 2/16/2022  Impression: Minimal degenerative change  Small Suprapatellar joint effusion  VAS lower limb venous duplex study, unilateral/limited 2/16/2022  Narrative:  [facility]: Indications: Patient c/o right posterior knee pain for 4 days  No history of DVT/PE/CA/recent surgery or trauma  CONCLUSION: Impression: RIGHT LOWER LIMB No evidence of acute or chronic deep vein thrombosis   No evidence of superficial thrombophlebitis noted  LEFT LOWER LIMB LIMITED Evaluation shows no evidence of thrombus in the common femoral vein  Doppler evaluation shows a normal response to augmentation maneuvers             Labs:   Admission on 02/16/2022, Discharged on 02/18/2022   Component Date Value Ref Range Status    WBC 02/16/2022 19 25* 4 31 - 10 16 Thousand/uL Final    RBC 02/16/2022 5  31  3 88 - 5 62 Million/uL Final    Hemoglobin 02/16/2022 14 0  12 0 - 17 0 g/dL Final    Hematocrit 02/16/2022 43 5  36 5 - 49 3 % Final    MCV 02/16/2022 82  82 - 98 fL Final    MCH 02/16/2022 26 4* 26 8 - 34 3 pg Final    MCHC 02/16/2022 32 2  31 4 - 37 4 g/dL Final    RDW 02/16/2022 13 8  11 6 - 15 1 % Final    MPV 02/16/2022 10 0  8 9 - 12 7 fL Final    Platelets 30/66/5704 480* 149 - 390 Thousands/uL Final    nRBC 02/16/2022 0  /100 WBCs Final    Neutrophils Relative 02/16/2022 80* 43 - 75 % Final    Immat GRANS % 02/16/2022 1  0 - 2 % Final    Lymphocytes Relative 02/16/2022 12* 14 - 44 % Final    Monocytes Relative 02/16/2022 6  4 - 12 % Final    Eosinophils Relative 02/16/2022 1  0 - 6 % Final    Basophils Relative 02/16/2022 0  0 - 1 % Final    Neutrophils Absolute 02/16/2022 15 26* 1 85 - 7 62 Thousands/µL Final    Immature Grans Absolute 02/16/2022 0 19  0 00 - 0 20 Thousand/uL Final    Lymphocytes Absolute 02/16/2022 2 39  0 60 - 4 47 Thousands/µL Final    Monocytes Absolute 02/16/2022 1 24* 0 17 - 1 22 Thousand/µL Final    Eosinophils Absolute 02/16/2022 0 11  0 00 - 0 61 Thousand/µL Final    Basophils Absolute 02/16/2022 0 06  0 00 - 0 10 Thousands/µL Final    Sodium 02/16/2022 137  133 - 145 mmol/L Final    Potassium 02/16/2022 3 3* 3 5 - 5 0 mmol/L Final    Chloride 02/16/2022 98  96 - 108 mmol/L Final    CO2 02/16/2022 26  22 - 33 mmol/L Final    ANION GAP 02/16/2022 13  4 - 13 mmol/L Final    BUN 02/16/2022 16  6 - 20 mg/dL Final    Creatinine 02/16/2022 1 30* 0 50 - 1 20 mg/dL Final    Standardized to IDMS reference method    Glucose 02/16/2022 117  65 - 140 mg/dL Final    If the patient is fasting, the ADA then defines impaired fasting glucose as > 100 mg/dL and diabetes as > or equal to 123 mg/dL  Specimen collection should occur prior to Sulfasalazine administration due to the potential for falsely depressed results   Specimen collection should occur prior to Sulfapyridine administration due to the potential for falsely elevated results   Calcium 02/16/2022 9 7  8 4 - 10 2 mg/dL Final    AST 02/16/2022 15  15 - 41 U/L Final    Specimen collection should occur prior to Sulfasalazine administration due to the potential for falsely depressed results   ALT 02/16/2022 20  5 - 63 U/L Final    Specimen collection should occur prior to Sulfasalazine administration due to the potential for falsely depressed results   Alkaline Phosphatase 02/16/2022 51 0  10 - 129 U/L Final    Total Protein 02/16/2022 9 8* 6 4 - 8 3 g/dL Final    Albumin 02/16/2022 4 2  3 4 - 4 8 g/dL Final    Total Bilirubin 02/16/2022 0 57  0 30 - 1 20 mg/dL Final    eGFR 02/16/2022 68  ml/min/1 73sq m Final    hs TnI 0hr 02/16/2022 5  "Refer to ACS Flowchart" - see link ng/L Final    Comment:                                              Initial (time 0) result  If >=50 ng/L, Myocardial injury suggested ;  Type of myocardial injury and treatment strategy  to be determined  If 5-49 ng/L, a delta result at 2 hours and or 4 hours will be needed to further evaluate  If <4 ng/L, and chest pain has been >3 hours since onset, patient may qualify for discharge based on the HEART score in the ED  If <5 ng/L and <3hours since onset of chest pain, a delta result at 2 hours will be needed to further evaluate  Second Troponin (time 2 hours)  If calculated delta >= 20 ng/L,  Myocardial injury suggested ; Type of myocardial injury and treatment strategy to be determined  If 5-49 ng/L and the calculated delta is 5-19 ng/L, consult medical service for evaluation  Continue evaluation for ischemia on ecg and other possible etiology and repeat hs troponin at 4 hours  If delta is <5 ng/L at 2 hours, consider discharge based on risk stratification via the HEART score (if in ED), or JILLIAN                            risk score in IP/Observation   Blood Culture 02/16/2022 No Growth After 5 Days     Final    Blood Culture 02/16/2022 No Growth After 5 Days  Final    Procalcitonin 02/16/2022 0 26* <=0 25 ng/ml Final    Comment: Suspected Lower Respiratory Tract Infection (LRTI):  - LESS than or EQUAL to 0 25 ng/mL:   low likelihood for bacterial LRTI; antibiotics DISCOURAGED   - GREATER than 0 25 ng/mL:   increased likelihood for bacterial LRTI; antibiotics ENCOURAGED  Suspected Sepsis:  - Strongly consider initiating antibiotics in ALL UNSTABLE patients  - LESS than or EQUAL to 0 5 ng/mL:   low likelihood for bacterial sepsis; antibiotics DISCOURAGED   - GREATER than 0 5 ng/mL:   increased likelihood for bacterial sepsis; antibiotics ENCOURAGED   - GREATER than 2 ng/mL:   high risk for severe sepsis / septic shock; antibiotics strongly ENCOURAGED  Decisions on antibiotic use should not be based solely on Procalcitonin (PCT) levels  If PCT is low but uncertainty exists with stopping antibiotics, repeat PCT in 6-24 hours to confirm the low level  If antibiotics are administered (regardless if initial PCT was high or low), repeat PCT every 1-2 days to consider early antibiotic cessation (when GREATER                            than 80% decrease from the peak OR when PCT drops below designated cutoffs, whichever comes first), so long as the infection is NOT one that typically requires prolonged treatment durations (e g , bone/joint infections, endocarditis, Staph  aureus bacteremia)      Situations of FALSE-POSITIVE Procalcitonin values:  1) Newborns < 67 hours old  2) Massive stress from severe trauma / burns, major surgery, acute pancreatitis, cardiogenic / hemorrhagic shock, sickle cell crisis, or other organ perfusion abnormalities  3) Malaria and some Candidal infections  4) Treatment with agents that stimulate cytokines (e g , OKT3, anti-lymphocyte globulins, alemtuzumab, IL-2, granulocyte transfusion [NOT GCSFs])  5) Chronic renal disease causes elevated baseline levels (consider GREATER than 0 75 ng/mL as an abnormal cut-off); initiating HD/CRRT may cause transient decreases  6) Paraneoplastic syndromes from medullary thyroid or SCLC, some forms of vasculitis, and acute [id number]                            disease    Situations of FALSE-NEGATIVE Procalcitonin values:  1) Too early in clinical course for PCT to have reached its peak (may repeat in 6-24 hours to confirm low level)  2) Localized infection WITHOUT systemic (SIRS / sepsis) response (e g , an abscess, osteomyelitis, cystitis)  3) Mycobacteria (e g , Tuberculosis, MAC)  4) Cystic fibrosis exacerbations      Protime 02/16/2022 13 8  11 6 - 14 5 seconds Final    INR 02/16/2022 1 07  0 84 - 1 19 Final    PTT 02/16/2022 41* 23 - 37 seconds Final    Therapeutic Heparin Range =  60-90 seconds    Color, UA 02/17/2022 Yellow  Yellow Final    Clarity, UA 02/17/2022 Clear  Clear Final    Specific Gravity, UA 02/17/2022 1 020  1 001 - 1 030 Final    pH, UA 02/17/2022 6 5  5 0, 5 5, 6 0, 6 5, 7 0, 7 5, 8 0 Final    Leukocytes, UA 02/17/2022 Negative  Negative Final    Nitrite, UA 02/17/2022 Negative  Negative Final    Protein, UA 02/17/2022 1+* Negative, Interference- unable to analyze mg/dl Final    Glucose, UA 02/17/2022 Negative  Negative mg/dl Final    Ketones, UA 02/17/2022 Negative  Negative mg/dl Final    Urobilinogen, UA 02/17/2022 0 2  0 2, 1 0 E U /dl E U /dl Final    Bilirubin, UA 02/17/2022 Negative  Negative Final    Blood, UA 02/17/2022 Negative  Negative Final    LACTIC ACID 02/16/2022 2 3* 0 0 - 2 0 mmol/L Final    SARS-CoV-2 02/16/2022 Negative  Negative Final    INFLUENZA A PCR 02/16/2022 Negative  Negative Final    INFLUENZA B PCR 02/16/2022 Negative  Negative Final    RSV PCR 02/16/2022 Negative  Negative Final    CRP 02/16/2022 19 6* 0 0 - 1 0 mg/dL Final    hs TnI 2hr 02/16/2022 6   "Refer to ACS Flowchart" - see link ng/L Final    Comment:                                              Initial (time 0) result  If >=50 ng/L, Myocardial injury suggested ;  Type of myocardial injury and treatment strategy  to be determined  If 5-49 ng/L, a delta result at 2 hours and or 4 hours will be needed to further evaluate  If <4 ng/L, and chest pain has been >3 hours since onset, patient may qualify for discharge based on the HEART score in the ED  If <5 ng/L and <3hours since onset of chest pain, a delta result at 2 hours will be needed to further evaluate  Second Troponin (time 2 hours)  If calculated delta >= 20 ng/L,  Myocardial injury suggested ; Type of myocardial injury and treatment strategy to be determined  If 5-49 ng/L and the calculated delta is 5-19 ng/L, consult medical service for evaluation  Continue evaluation for ischemia on ecg and other possible etiology and repeat hs troponin at 4 hours  If delta is <5 ng/L at 2 hours, consider discharge based on risk stratification via the HEART score (if in ED), or JILLIAN                            risk score in IP/Observation   Delta 2hr hsTnI 02/16/2022 1  <20 ng/L Final    Procalcitonin 02/17/2022 0 29* <=0 25 ng/ml Final    Comment: Suspected Lower Respiratory Tract Infection (LRTI):  - LESS than or EQUAL to 0 25 ng/mL:   low likelihood for bacterial LRTI; antibiotics DISCOURAGED   - GREATER than 0 25 ng/mL:   increased likelihood for bacterial LRTI; antibiotics ENCOURAGED  Suspected Sepsis:  - Strongly consider initiating antibiotics in ALL UNSTABLE patients  - LESS than or EQUAL to 0 5 ng/mL:   low likelihood for bacterial sepsis; antibiotics DISCOURAGED   - GREATER than 0 5 ng/mL:   increased likelihood for bacterial sepsis; antibiotics ENCOURAGED   - GREATER than 2 ng/mL:   high risk for severe sepsis / septic shock; antibiotics strongly ENCOURAGED  Decisions on antibiotic use should not be based solely on Procalcitonin (PCT) levels  If PCT is low but uncertainty exists with stopping antibiotics, repeat PCT in 6-24 hours to confirm the low level   If antibiotics are administered (regardless if initial PCT was high or low), repeat PCT every 1-2 days to consider early antibiotic cessation (when GREATER                            than 80% decrease from the peak OR when PCT drops below designated cutoffs, whichever comes first), so long as the infection is NOT one that typically requires prolonged treatment durations (e g , bone/joint infections, endocarditis, Staph  aureus bacteremia)  Situations of FALSE-POSITIVE Procalcitonin values:  1) Newborns < 67 hours old  2) Massive stress from severe trauma / burns, major surgery, acute pancreatitis, cardiogenic / hemorrhagic shock, sickle cell crisis, or other organ perfusion abnormalities  3) Malaria and some Candidal infections  4) Treatment with agents that stimulate cytokines (e g , OKT3, anti-lymphocyte globulins, alemtuzumab, IL-2, granulocyte transfusion [NOT GCSFs])  5) Chronic renal disease causes elevated baseline levels (consider GREATER than 0 75 ng/mL as an abnormal cut-off); initiating HD/CRRT may cause transient decreases  6) Paraneoplastic syndromes from medullary thyroid or SCLC, some forms of vasculitis, and acute [id number]                            disease    Situations of FALSE-NEGATIVE Procalcitonin values:  1) Too early in clinical course for PCT to have reached its peak (may repeat in 6-24 hours to confirm low level)  2) Localized infection WITHOUT systemic (SIRS / sepsis) response (e g , an abscess, osteomyelitis, cystitis)  3) Mycobacteria (e g , Tuberculosis, MAC)  4) Cystic fibrosis exacerbations      TSH 3RD GENERATON 02/17/2022 3 013  0 340 - 5 600 uIU/mL Final    Uric Acid 02/16/2022 10 6* 3 4 - 8 7 mg/dL Final    Specimen collection should occur prior to Metamizole administration due to the potential for falsely depressed results      Sed Rate 02/16/2022 28* 0 - 15 mm/hour Final    Body Fluid Culture, Sterile 02/16/2022 No growth   Final    Gram Stain Result 02/16/2022 1+ Polys   Final    Gram Stain Result 02/16/2022 No organisms seen   Final    Ventricular Rate 02/16/2022 129  BPM Final    Atrial Rate 02/16/2022 130  BPM Final    DC Interval 02/16/2022 115  ms Final    QRSD Interval 02/16/2022 94  ms Final    QT Interval 02/16/2022 296  ms Final    QTC Interval 02/16/2022 434  ms Final    P Axis 02/16/2022 22  degrees Final    QRS Axis 02/16/2022 70  degrees Final    T Wave [place] 02/16/2022 0  degrees Final    Sodium 02/17/2022 138  133 - 145 mmol/L Final    Potassium 02/17/2022 3 2* 3 5 - 5 0 mmol/L Final    Chloride 02/17/2022 98  96 - 108 mmol/L Final    CO2 02/17/2022 24  22 - 33 mmol/L Final    ANION GAP 02/17/2022 16* 4 - 13 mmol/L Final    BUN 02/17/2022 16  6 - 20 mg/dL Final    Creatinine 02/17/2022 1 18  0 50 - 1 20 mg/dL Final    Standardized to IDMS reference method    Glucose 02/17/2022 109  65 - 140 mg/dL Final    If the patient is fasting, the ADA then defines impaired fasting glucose as > 100 mg/dL and diabetes as > or equal to 123 mg/dL  Specimen collection should occur prior to Sulfasalazine administration due to the potential for falsely depressed results  Specimen collection should occur prior to Sulfapyridine administration due to the potential for falsely elevated results      Calcium 02/17/2022 9 5  8 4 - 10 2 mg/dL Final    eGFR 02/17/2022 77  ml/min/1 73sq m Final    Magnesium 02/17/2022 2 1  1 6 - 2 6 mg/dL Final    WBC 02/17/2022 15 57* 4 31 - 10 16 Thousand/uL Final    RBC 02/17/2022 4 75  3 88 - 5 62 Million/uL Final    Hemoglobin 02/17/2022 12 9  12 0 - 17 0 g/dL Final    Hematocrit 02/17/2022 39 9  36 5 - 49 3 % Final    MCV 02/17/2022 84  82 - 98 fL Final    MCH 02/17/2022 27 2  26 8 - 34 3 pg Final    MCHC 02/17/2022 32 3  31 4 - 37 4 g/dL Final    RDW 02/17/2022 13 7  11 6 - 15 1 % Final    MPV 02/17/2022 9 8  8 9 - 12 7 fL Final    Platelets 72/69/8687 472* 149 - 390 Thousands/uL Final    nRBC 02/17/2022 0  /100 WBCs Final    Neutrophils Relative 02/17/2022 75  43 - 75 % Final    Immat GRANS % 02/17/2022 1  0 - 2 % Final    Lymphocytes Relative 02/17/2022 14  14 - 44 % Final    Monocytes Relative 02/17/2022 9  4 - 12 % Final    Eosinophils Relative 02/17/2022 1  0 - 6 % Final    Basophils Relative 02/17/2022 0  0 - 1 % Final    Neutrophils Absolute 02/17/2022 11 63* 1 85 - 7 62 Thousands/µL Final    Immature Grans Absolute 02/17/2022 0 10  0 00 - 0 20 Thousand/uL Final    Lymphocytes Absolute 02/17/2022 2 24  0 60 - 4 47 Thousands/µL Final    Monocytes Absolute 02/17/2022 1 33* 0 17 - 1 22 Thousand/µL Final    Eosinophils Absolute 02/17/2022 0 21  0 00 - 0 61 Thousand/µL Final    Basophils Absolute 02/17/2022 0 06  0 00 - 0 10 Thousands/µL Final    LACTIC ACID 02/17/2022 3 0* 0 0 - 2 0 mmol/L Final    LACTIC ACID 02/17/2022 3 0* 0 0 - 2 0 mmol/L Final    Crystals, Synovial Fluid 02/17/2022 Positive for Monosodium Urate Crystals  No Crystals Seen Final    LACTIC ACID 02/17/2022 1 0  0 0 - 2 0 mmol/L Final    RBC, UA 02/17/2022 0-1  None Seen, 0-1, 1-2, 2-4, 0-5 /hpf Final    WBC, UA 02/17/2022 0-1  None Seen, 0-1, 1-2, 0-5, 2-4 /hpf Final    Epithelial Cells 02/17/2022 None Seen  None Seen, Occasional /hpf Final    Bacteria, UA 02/17/2022 None Seen  None Seen, Occasional /hpf Final    Blood Culture 02/17/2022 No Growth After 5 Days  Final    Blood Culture 02/17/2022 No Growth After 5 Days     Final    WBC 02/18/2022 15 65* 4 31 - 10 16 Thousand/uL Final    RBC 02/18/2022 4 62  3 88 - 5 62 Million/uL Final    Hemoglobin 02/18/2022 12 1  12 0 - 17 0 g/dL Final    Hematocrit 02/18/2022 38 2  36 5 - 49 3 % Final    MCV 02/18/2022 83  82 - 98 fL Final    MCH 02/18/2022 26 2* 26 8 - 34 3 pg Final    MCHC 02/18/2022 31 7  31 4 - 37 4 g/dL Final    RDW 02/18/2022 13 5  11 6 - 15 1 % Final    MPV 02/18/2022 9 7  8 9 - 12 7 fL Final    Platelets 83/31/6274 471* 149 - 390 Thousands/uL Final    nRBC 02/18/2022 0  /100 WBCs Final  Neutrophils Relative 02/18/2022 66  43 - 75 % Final    Immat GRANS % 02/18/2022 1  0 - 2 % Final    Lymphocytes Relative 02/18/2022 19  14 - 44 % Final    Monocytes Relative 02/18/2022 12  4 - 12 % Final    Eosinophils Relative 02/18/2022 2  0 - 6 % Final    Basophils Relative 02/18/2022 0  0 - 1 % Final    Neutrophils Absolute 02/18/2022 10 40* 1 85 - 7 62 Thousands/µL Final    Immature Grans Absolute 02/18/2022 0 10  0 00 - 0 20 Thousand/uL Final    Lymphocytes Absolute 02/18/2022 2 92  0 60 - 4 47 Thousands/µL Final    Monocytes Absolute 02/18/2022 1 89* 0 17 - 1 22 Thousand/µL Final    Eosinophils Absolute 02/18/2022 0 28  0 00 - 0 61 Thousand/µL Final    Basophils Absolute 02/18/2022 0 06  0 00 - 0 10 Thousands/µL Final    Sodium 02/18/2022 138  133 - 145 mmol/L Final    Potassium 02/18/2022 3 4* 3 5 - 5 0 mmol/L Final    Chloride 02/18/2022 98  96 - 108 mmol/L Final    CO2 02/18/2022 29  22 - 33 mmol/L Final    ANION GAP 02/18/2022 11  4 - 13 mmol/L Final    BUN 02/18/2022 18  6 - 20 mg/dL Final    Creatinine 02/18/2022 1 19  0 50 - 1 20 mg/dL Final    Standardized to IDMS reference method    Glucose 02/18/2022 100  65 - 140 mg/dL Final    If the patient is fasting, the ADA then defines impaired fasting glucose as > 100 mg/dL and diabetes as > or equal to 123 mg/dL  Specimen collection should occur prior to Sulfasalazine administration due to the potential for falsely depressed results  Specimen collection should occur prior to Sulfapyridine administration due to the potential for falsely elevated results      Calcium 02/18/2022 9 3  8 4 - 10 2 mg/dL Final    eGFR 02/18/2022 76  ml/min/1 73sq m Final    Procalcitonin 02/18/2022 0 46* <=0 25 ng/ml Final    Comment: Suspected Lower Respiratory Tract Infection (LRTI):  - LESS than or EQUAL to 0 25 ng/mL:   low likelihood for bacterial LRTI; antibiotics DISCOURAGED   - GREATER than 0 25 ng/mL:   increased likelihood for bacterial LRTI; antibiotics ENCOURAGED  Suspected Sepsis:  - Strongly consider initiating antibiotics in ALL UNSTABLE patients  - LESS than or EQUAL to 0 5 ng/mL:   low likelihood for bacterial sepsis; antibiotics DISCOURAGED   - GREATER than 0 5 ng/mL:   increased likelihood for bacterial sepsis; antibiotics ENCOURAGED   - GREATER than 2 ng/mL:   high risk for severe sepsis / septic shock; antibiotics strongly ENCOURAGED  Decisions on antibiotic use should not be based solely on Procalcitonin (PCT) levels  If PCT is low but uncertainty exists with stopping antibiotics, repeat PCT in 6-24 hours to confirm the low level  If antibiotics are administered (regardless if initial PCT was high or low), repeat PCT every 1-2 days to consider early antibiotic cessation (when GREATER                            than 80% decrease from the peak OR when PCT drops below designated cutoffs, whichever comes first), so long as the infection is NOT one that typically requires prolonged treatment durations (e g , bone/joint infections, endocarditis, Staph  aureus bacteremia)      Situations of FALSE-POSITIVE Procalcitonin values:  1) Newborns < 67 hours old  2) Massive stress from severe trauma / burns, major surgery, acute pancreatitis, cardiogenic / hemorrhagic shock, sickle cell crisis, or other organ perfusion abnormalities  3) Malaria and some Candidal infections  4) Treatment with agents that stimulate cytokines (e g , OKT3, anti-lymphocyte globulins, alemtuzumab, IL-2, granulocyte transfusion [NOT GCSFs])  5) Chronic renal disease causes elevated baseline levels (consider GREATER than 0 75 ng/mL as an abnormal cut-off); initiating HD/CRRT may cause transient decreases  6) Paraneoplastic syndromes from medullary thyroid or SCLC, some forms of vasculitis, and acute [id number]                            disease    Situations of FALSE-NEGATIVE Procalcitonin values:  1) Too early in clinical course for PCT to have reached its peak (may repeat in 6-24 hours to confirm low level)  2) Localized infection WITHOUT systemic (SIRS / sepsis) response (e g , an abscess, osteomyelitis, cystitis)  3) Mycobacteria (e g , Tuberculosis, MAC)  4) Cystic fibrosis exacerbations      Supplier Name 02/18/2022 [employer] [facility]   In process    Supplier Phone Number 02/18/2022 [phone]   In process    Order Status 02/18/2022 Pending Delivery Ticket   In process    Delivery Request Date 02/18/2022 02/18/2022   In process    Item Description 02/18/2022 Magda Ward, Adult   In process    Qty: 1

## 2022-02-23 ENCOUNTER — OFFICE VISIT (OUTPATIENT)
Dept: RHEUMATOLOGY | Facility: CLINIC | Age: 42
End: 2022-02-23
Payer: MEDICARE

## 2022-02-23 VITALS — SYSTOLIC BLOOD PRESSURE: 152 MMHG | HEART RATE: 112 BPM | DIASTOLIC BLOOD PRESSURE: 98 MMHG

## 2022-02-23 DIAGNOSIS — R65.10 SIRS (SYSTEMIC INFLAMMATORY RESPONSE SYNDROME) (HCC): ICD-10-CM

## 2022-02-23 DIAGNOSIS — M1A.9XX0 CHRONIC GOUT WITHOUT TOPHUS, UNSPECIFIED CAUSE, UNSPECIFIED SITE: Primary | ICD-10-CM

## 2022-02-23 LAB
BACTERIA BLD CULT: NORMAL
BACTERIA BLD CULT: NORMAL

## 2022-02-23 PROCEDURE — 99244 OFF/OP CNSLTJ NEW/EST MOD 40: CPT | Performed by: INTERNAL MEDICINE

## 2022-02-23 RX ORDER — ALLOPURINOL 100 MG/1
100 TABLET ORAL DAILY
Qty: 30 TABLET | Refills: 6 | Status: SHIPPED | OUTPATIENT
Start: 2022-02-23 | End: 2022-08-04 | Stop reason: SDUPTHER

## 2022-02-23 RX ORDER — PREDNISONE 10 MG/1
TABLET ORAL
Qty: 20 TABLET | Refills: 0 | Status: SHIPPED | OUTPATIENT
Start: 2022-02-23 | End: 2022-03-08

## 2022-02-23 RX ORDER — COLCHICINE 0.6 MG/1
0.6 TABLET ORAL DAILY
Qty: 30 TABLET | Refills: 6 | Status: SHIPPED | OUTPATIENT
Start: 2022-02-23 | End: 2022-05-14 | Stop reason: SDUPTHER

## 2022-02-23 NOTE — PATIENT INSTRUCTIONS
Start allopurinol 100mg daily  Continue colchicine daily  Take prednisone 30mg daily for 3 days, then 20mg daily for 3 days, then 10mg daily for 3 days, then 5mg daily for 4 days  Call with any gout flare  Do labs in 1 month    Return to clinic in 4 months    Low Purine Diet   WHAT YOU NEED TO KNOW:   What is a low-purine diet? A low-purine diet is a meal plan based on foods that are low in purine content  Purine is a substance that is found in foods and is produced naturally by the body  Purines are broken down by the body and changed to uric acid  The kidneys normally filter the uric acid, and it leaves the body through the urine  However, people with gout sometimes have a buildup of uric acid in the blood  This buildup of uric acid can cause swelling and pain (a gout attack)  A low-purine diet may help to treat and prevent gout attacks  What foods can I include? The following foods are low in purine  · Eggs, nuts, and peanut butter    · Low-fat and fat free cheese and ice cream    · Skim or 1% milk    · Soup made without meat extract or broth    · Vegetables that are not on the medium-purine list below    · All fruit and fruit juices    · Bread, pasta, rice, cake, cornbread, and popcorn    · Water, soda, tea, coffee, and cocoa    · Sugar, sweets, and gelatin    · Fat and oil    What foods should I limit? · Medium-purine foods:      ? Meats:  Limit the following to 4 to 6 ounces each day  § Meat and [employer], lobster, oysters, and shrimp    ? Vegetables:  Limit the following vegetables to ½ cup each day  § Asparagus    § Cauliflower    § Spinach    § Mushrooms    § Green peas    ? Beans, peas, and lentils (limit to 1 cup each day)    ? Oats and oatmeal (limit to ? cup uncooked each day)    ? Wheat germ and bran (limit to ¼ cup each day)    · High-purine foods:  Limit or avoid foods high in purine  ? Anchovies, sardines, scallops, and mussels    ?  Tuna, codfish, herring, and toyin    ? [employer], like goose and duck    ? Organ meats, such as brains, heart, kidney, liver, and sweetbreads    ? Gravies and sauces made with meat    ? Yeast extracts taken in the form of a supplement    What other guidelines should I follow? · Increase liquid intake  Drink 8 to 16 (eight-ounce) cups of liquid each day  At least half of the liquid you drink should be water  Liquid can help your body get rid of extra uric acid  · Limit or avoid alcohol  Alcohol (especially beer) increases your risk of a gout attack  Beer contains a high amount of purine  · Maintain a healthy weight  If you are overweight, you should lose weight slowly  Weight loss can help decrease the amount of stress on your joints  Regular exercise can help you lose weight if you are overweight, or maintain your weight if you are at a normal weight  Talk to your healthcare provider before you begin an exercise program     CARE AGREEMENT:   You have the right to help plan your care  Discuss treatment options with your healthcare provider to decide what care you want to receive  You always have the right to refuse treatment  The above information is an  only  It is not intended as medical advice for individual conditions or treatments  Talk to your doctor, nurse or pharmacist before following any medical regimen to see if it is safe and effective for you  © Copyright [employer] 2021 Information is for End User's use only and may not be sold, redistributed or otherwise used for commercial purposes  All illustrations and images included in CareNotes® are the copyrighted property of [employer]  or [facility] Evelyn Schulz       Gout   AMBULATORY CARE:   Gout  is a form of arthritis that causes severe joint pain and stiffness  Acute gout pain starts suddenly, gets worse quickly, and stops on its own  Acute gout can become chronic and cause permanent damage to your joints    Seek care immediately if:   · You have severe pain in one or more of your joints that you cannot tolerate  · You have a fever or redness that spreads beyond the joint area  Call your doctor if:   · You have new symptoms, such as a rash, after you start gout treatment  · Your joint pain and swelling do not go away, even after treatment  · You are not urinating as much or as often as you usually do  · You have trouble taking your gout medicines  · You have questions or concerns about your condition or care  Stages of gout:   · Hyperuricemia  starts with high levels of uric acid  Hyperuricemia is not gout, but it increases your risk for gout  You may have no symptoms at this stage, and it usually does not need treatment  · Acute gouty arthritis  starts with a sudden attack of pain and swelling, usually in 1 joint  The attack may last from a few days to 2 weeks  · Intercritical gout  is the time between attacks  You may go months or years without another attack  You will not have joint pain or stiffness, but this does not mean your gout is cured  You will still need treatment to prevent chronic gout  · Chronic tophaceous gout  develops if gout is not treated  Large amounts of uric acid crystals, called tophi, collect around your joints  The crystals can destroy or deform the joints  Gout attacks occur more often, and last hours to weeks  More than 1 joint may be painful and swollen  At this stage, gout symptoms do not go away on their own  Medicines: You may need any of the following:  · Prescription pain medicine  may be given  Ask your healthcare provider how to take this medicine safely  Some prescription pain medicines contain acetaminophen  Do not take other medicines that contain acetaminophen without talking to your healthcare provider  Too much acetaminophen may cause liver damage  Prescription pain medicine may cause constipation  Ask your healthcare provider how to prevent or treat constipation       · NSAIDs , such as ibuprofen, help decrease swelling, pain, and fever  This medicine is available with or without a doctor's order  NSAIDs can cause stomach bleeding or kidney problems in certain people  If you take blood thinner medicine, always ask your healthcare provider if NSAIDs are safe for you  Always read the medicine label and follow directions  · Gout medicine  decreases joint pain and swelling  It may also be given to prevent new gout attacks  · Steroids  reduce inflammation and can help your joint stiffness and pain during gout attacks  · Uric acid medicine  may be given to reduce the amount of uric acid your body makes  Some medicines may help you pass more uric acid when you urinate  · Take your medicine as directed  Contact your healthcare provider if you think your medicine is not helping or if you have side effects  Tell him or her if you are allergic to any medicine  Keep a list of the medicines, vitamins, and herbs you take  Include the amounts, and when and why you take them  Bring the list or the pill bottles to follow-up visits  Carry your medicine list with you in case of an emergency  Manage your symptoms:       · Rest your painful joint so it can heal   Your healthcare provider may recommend crutches or a walker if the affected joint is in a leg  · Apply ice to your joint  Ice decreases pain and swelling  Use an ice pack, or put crushed ice in a plastic bag  Cover the ice pack or bag with a towel before you apply it to your painful joint  Apply ice for 15 to 20 minutes every hour, or as directed  · Elevate your joint  Elevation helps reduce swelling and pain  Raise your joint above the level of your heart as often as you can  Prop your painful joint on pillows to keep it above your heart comfortably  · Go to physical therapy if directed  A physical therapist can teach you exercises to improve flexibility and range of motion      Help prevent gout attacks:   · Do not eat high-purine foods  These foods include meats, seafood, asparagus, spinach, cauliflower, and some types of beans  Healthcare providers may tell you to eat more low-fat milk products, such as yogurt  Milk products may decrease your risk for gout attacks  Vitamin C and coffee may also help  Your healthcare provider or dietitian can help you create a meal plan  · Drink liquids as directed  Liquids such as water help remove uric acid from your body  Ask how much liquid to drink each day and which liquids are best for you  · Maintain a healthy weight  Weight loss may decrease the amount of uric acid in your body  Ask your healthcare provider what a healthy weight is for you  Ask him or her to help you create a weight loss plan if you are overweight  · Control your blood sugar level if you have diabetes  Keep your blood sugar level in a normal range  This can help prevent gout attacks  · Limit or do not drink alcohol as directed  Alcohol can trigger a gout attack  Alcohol also increases your risk for dehydration  Ask your healthcare provider if alcohol is safe for you  Follow up with your doctor as directed: You may be referred to a rheumatologist or podiatrist  Write down your questions so you remember to ask them during your visits  © Copyright [employer] 2021 Information is for End User's use only and may not be sold, redistributed or otherwise used for commercial purposes  All illustrations and images included in CareNotes® are the copyrighted property of [employer]  or Manasa Schulz   The above information is an  only  It is not intended as medical advice for individual conditions or treatments  Talk to your doctor, nurse or pharmacist before following any medical regimen to see if it is safe and effective for you

## 2022-02-24 ENCOUNTER — TELEPHONE (OUTPATIENT)
Dept: OBGYN CLINIC | Facility: HOSPITAL | Age: 42
End: 2022-02-24

## 2022-02-24 NOTE — TELEPHONE ENCOUNTER
Dr James Gordillo:  Prednisone med change     Patient's wife states the insurance won't allow the pharmacy to fill the Prednisone as it was filled in the hospital   There was a change in instructions  So, there will be a gap in meds  [facility] states if the Dr calls and states new instructions/patient won't have enough pills left, they will be able to do the overrride for insurance to fill  predniSONE 10 mg tablet [[id number]]     Order Details  Dose, Route, Frequency: As Directed   Dispense Quantity: 20 tablet Refills: 0          Sig: Take 3 tablets (30 mg total) by mouth daily for 3 days, THEN 2 tablets (20 mg total) daily for 3 days, THEN 1 tablet (10 mg total) daily for 3 days, THEN 0 5 tablets (5 mg total) daily for 4 days       [facility] [address] [address] [address] [place] [address]   Phone:  [phone]  Fax:  [phone]

## 2022-02-28 ENCOUNTER — OFFICE VISIT (OUTPATIENT)
Dept: FAMILY MEDICINE CLINIC | Facility: CLINIC | Age: 42
End: 2022-02-28

## 2022-02-28 VITALS
WEIGHT: 248 LBS | OXYGEN SATURATION: 98 % | RESPIRATION RATE: 18 BRPM | SYSTOLIC BLOOD PRESSURE: 112 MMHG | DIASTOLIC BLOOD PRESSURE: 70 MMHG | TEMPERATURE: 97.8 F | BODY MASS INDEX: 46.82 KG/M2 | HEIGHT: 61 IN | HEART RATE: 95 BPM

## 2022-02-28 DIAGNOSIS — M1A.0790 IDIOPATHIC CHRONIC GOUT OF ANKLE WITHOUT TOPHUS, UNSPECIFIED LATERALITY: Primary | ICD-10-CM

## 2022-02-28 PROBLEM — R50.9 FEVER: Status: RESOLVED | Noted: 2022-02-17 | Resolved: 2022-02-28

## 2022-02-28 PROCEDURE — 99213 OFFICE O/P EST LOW 20 MIN: CPT

## 2022-02-28 NOTE — PATIENT INSTRUCTIONS
Gout   WHAT YOU NEED TO KNOW:   Gout is a form of arthritis that causes severe joint pain, redness, swelling, and stiffness  Acute gout pain starts suddenly, gets worse quickly, and stops on its own  Acute gout can become chronic and cause permanent damage to the joints  DISCHARGE INSTRUCTIONS:   Return to the emergency department if:   · You have severe pain in one or more of your joints that you cannot tolerate  · You have a fever or redness that spreads beyond the joint area  Call your doctor if:   · You have new symptoms, such as a rash, after you start gout treatment  · Your joint pain and swelling do not go away, even after treatment  · You are not urinating as much or as often as you usually do  · You have trouble taking your gout medicines  · You have questions or concerns about your condition or care  Medicines: You may need any of the following:  · Prescription pain medicine  may be given  Ask your healthcare provider how to take this medicine safely  Some prescription pain medicines contain acetaminophen  Do not take other medicines that contain acetaminophen without talking to your healthcare provider  Too much acetaminophen may cause liver damage  Prescription pain medicine may cause constipation  Ask your healthcare provider how to prevent or treat constipation  · NSAIDs , such as ibuprofen, help decrease swelling, pain, and fever  This medicine is available with or without a doctor's order  NSAIDs can cause stomach bleeding or kidney problems in certain people  If you take blood thinner medicine, always ask your healthcare provider if NSAIDs are safe for you  Always read the medicine label and follow directions  · Gout medicine  decreases joint pain and swelling  It may also be given to prevent new gout attacks  · Steroids  reduce inflammation and can help your joint stiffness and pain during gout attacks      · Uric acid medicine  may be given to reduce the amount of uric acid your body makes  Some medicines may help you pass more uric acid when you urinate  · Take your medicine as directed  Contact your healthcare provider if you think your medicine is not helping or if you have side effects  Tell him or her if you are allergic to any medicine  Keep a list of the medicines, vitamins, and herbs you take  Include the amounts, and when and why you take them  Bring the list or the pill bottles to follow-up visits  Carry your medicine list with you in case of an emergency  Manage your symptoms:       · Rest your painful joint so it can heal   Your healthcare provider may recommend crutches or a walker if the affected joint is in a leg  · Apply ice to your joint  Ice decreases pain and swelling  Use an ice pack, or put crushed ice in a plastic bag  Cover the ice pack or bag with a towel before you apply it to your painful joint  Apply ice for 15 to 20 minutes every hour, or as directed  · Elevate your joint  Elevation helps reduce swelling and pain  Raise your joint above the level of your heart as often as you can  Prop your painful joint on pillows to keep it above your heart comfortably  · Go to physical therapy if directed  A physical therapist can teach you exercises to improve flexibility and range of motion  Help prevent gout attacks:   · Do not eat high-purine foods  These foods include meats, seafood, asparagus, spinach, cauliflower, and some types of beans  Healthcare providers may tell you to eat more low-fat milk products, such as yogurt  Milk products may decrease your risk for gout attacks  Vitamin C and coffee may also help  Your healthcare provider or dietitian can help you create a meal plan  · Drink liquids as directed  Liquids such as water help remove uric acid from your body  Ask how much liquid to drink each day and which liquids are best for you  · Maintain a healthy weight    Weight loss may decrease the amount of uric acid in your body  Ask your healthcare provider what a healthy weight is for you  Ask him or her to help you create a weight loss plan if you are overweight  · Control your blood sugar level if you have diabetes  Keep your blood sugar level in a normal range  This can help prevent gout attacks  · Limit or do not drink alcohol as directed  Alcohol can trigger a gout attack  Alcohol also increases your risk for dehydration  Ask your healthcare provider if alcohol is safe for you  Follow up with your doctor as directed: You may be referred to a rheumatologist or podiatrist  Write down your questions so you remember to ask them during your visits  © [employer] 2022 Information is for End User's use only and may not be sold, redistributed or otherwise used for commercial purposes  All illustrations and images included in CareNotes® are the copyrighted property of [employer]  or Manasa Arenas  The above information is an  only  It is not intended as medical advice for individual conditions or treatments  Talk to your doctor, nurse or pharmacist before following any medical regimen to see if it is safe and effective for you

## 2022-02-28 NOTE — PROGRESS NOTES
Assessment/Plan:    Idiopathic chronic gout of ankle without tophus  Continue current medication regime  Reviewed s/s of an acute gout flare and when to f/u with pcp  Diagnoses and all orders for this visit:    Idiopathic chronic gout of ankle without tophus, unspecified laterality          Subjective:      Patient ID: Pasquale Vincent is a 46 y o  male  Pasquale Vincent is a 46 y o  male  has a past medical history of Ankle swelling, Arthritis, Asthma, Dizziness, Heartburn, Hypertension, Increased frequency of urination, Intolerance to heat, Joint pain, Morbid obesity with BMI of 50 0-59 9, adult ([address] ), Sleep apnea, and SOB (shortness of breath)  has a past surgical history that includes IR aspiration joint (specify location) (2/16/2022)  He presents today for a TCM appointment         Hospital Course:      Pieter Fosterjj Fisher is a 46 y o  male patient with a past medical history of severe EVE and my asthma who originally presented to the hospital on 2/16/2022 due to right knee pain  Patient stated having joint pain for years but 4 days before admission the right knee started getting swollen and more painful than usual   Patient underwent arthrocentesis that showed he has gout disease  He was placed on colchicine, rheumatology was consulted recommended outpatient follow-up  His hydrochlorothiazide will be discontinue  His blood pressure medication was adjusted and losartan was included due to the uricosuric effect  Septic arthritis was ruled out, id team was on board  Fevers and white blood cell whether most likely related to this patient acute gout attack  Venous Doppler were negative for any kind of DVT  Initially patient presented with elevated lactic acid the resolved with IV fluids  Urine and blood cultures were negative  No signs of bacteria growing on the synovial fluids either      Patient will be discharged on prednisone tapering dose, colchicine and follow-up with Rheumatology in July  Overall, he is doing well  He only complains of mild bilateral ankle, significantly improved since hospitalization  He is taking allopurinol & colcrys as rx  He will finish his steroid taper tomorrow  Blood pressure continues to be controlled with new adjustment since hospitalization  The following portions of the patient's history were reviewed and updated as appropriate: allergies, current medications, past family history, past medical history, past social history, past surgical history and problem list     Review of Systems   Constitutional: Negative for chills and fever  HENT: Negative for ear pain and sore throat  Eyes: Negative for pain and visual disturbance  Respiratory: Negative for cough and shortness of breath  Cardiovascular: Negative for chest pain and palpitations  Gastrointestinal: Negative for abdominal pain and vomiting  Genitourinary: Negative for dysuria and hematuria  Musculoskeletal: Positive for arthralgias  Negative for back pain  Skin: Negative for color change and rash  Neurological: Negative for seizures and syncope  All other systems reviewed and are negative  Objective:      /70 (BP Location: Right arm, Patient Position: Sitting, Cuff Size: Large)   Pulse 95   Temp 97 8 °F (36 6 °C) (Temporal)   Resp 18   Ht 5' 1" (1 549 m)   Wt 112 kg (248 lb)   SpO2 98%   BMI 46 86 kg/m²          Physical Exam  Vitals and nursing note reviewed  Constitutional:       General: He is not in acute distress  Appearance: He is obese  He is not ill-appearing  HENT:      Head: Normocephalic and atraumatic  Right Ear: External ear normal  There is no impacted cerumen  Left Ear: External ear normal  There is no impacted cerumen  Nose: Nose normal  No congestion  Eyes:      Pupils: Pupils are equal, round, and reactive to light  Cardiovascular:      Rate and Rhythm: Normal rate and regular rhythm  Pulses: Normal pulses  Heart sounds: Normal heart sounds  No murmur heard  Pulmonary:      Effort: Pulmonary effort is normal       Breath sounds: Normal breath sounds  Abdominal:      General: Bowel sounds are normal       Palpations: Abdomen is soft  Tenderness: There is no abdominal tenderness  Musculoskeletal:         General: No tenderness  Normal range of motion  Cervical back: Normal range of motion  No tenderness  Right knee: Normal       Left knee: Normal       Right ankle: Normal       Left ankle: Normal    Feet:      Right foot:      Skin integrity: Skin integrity normal       Left foot:      Skin integrity: Skin integrity normal    Skin:     General: Skin is warm and dry  Neurological:      General: No focal deficit present  Mental Status: He is alert and oriented to person, place, and time  Psychiatric:         Mood and Affect: Mood normal          Behavior: Behavior normal          Thought Content:  Thought content normal          Judgment: Judgment normal

## 2022-03-14 DIAGNOSIS — R65.10 SIRS (SYSTEMIC INFLAMMATORY RESPONSE SYNDROME) (HCC): ICD-10-CM

## 2022-03-14 DIAGNOSIS — J30.9 ALLERGIC RHINITIS, UNSPECIFIED SEASONALITY, UNSPECIFIED TRIGGER: ICD-10-CM

## 2022-03-15 RX ORDER — LOSARTAN POTASSIUM 25 MG/1
25 TABLET ORAL DAILY
Qty: 30 TABLET | Refills: 0 | Status: SHIPPED | OUTPATIENT
Start: 2022-03-15 | End: 2022-04-13

## 2022-03-15 RX ORDER — MONTELUKAST SODIUM 10 MG/1
10 TABLET ORAL
Qty: 30 TABLET | Refills: 0 | Status: SHIPPED | OUTPATIENT
Start: 2022-03-15 | End: 2022-04-13

## 2022-03-17 LAB
DME PARACHUTE DELIVERY DATE ACTUAL: NORMAL
DME PARACHUTE DELIVERY DATE REQUESTED: NORMAL
DME PARACHUTE ITEM DESCRIPTION: NORMAL
DME PARACHUTE ORDER STATUS: NORMAL
DME PARACHUTE SUPPLIER NAME: NORMAL
DME PARACHUTE SUPPLIER PHONE: NORMAL

## 2022-04-01 DIAGNOSIS — I10 ESSENTIAL HYPERTENSION: ICD-10-CM

## 2022-04-01 RX ORDER — HYDROCHLOROTHIAZIDE 25 MG/1
TABLET ORAL
Qty: 180 TABLET | Refills: 1 | Status: SHIPPED | OUTPATIENT
Start: 2022-04-01

## 2022-04-12 DIAGNOSIS — R06.2 WHEEZING: ICD-10-CM

## 2022-04-12 DIAGNOSIS — J30.9 ALLERGIC RHINITIS, UNSPECIFIED SEASONALITY, UNSPECIFIED TRIGGER: ICD-10-CM

## 2022-04-13 DIAGNOSIS — J30.9 ALLERGIC RHINITIS, UNSPECIFIED SEASONALITY, UNSPECIFIED TRIGGER: ICD-10-CM

## 2022-04-13 DIAGNOSIS — R65.10 SIRS (SYSTEMIC INFLAMMATORY RESPONSE SYNDROME) (HCC): ICD-10-CM

## 2022-04-13 RX ORDER — MONTELUKAST SODIUM 10 MG/1
TABLET ORAL
Qty: 30 TABLET | Refills: 0 | Status: SHIPPED | OUTPATIENT
Start: 2022-04-13 | End: 2022-05-13

## 2022-04-13 RX ORDER — ALBUTEROL SULFATE 90 UG/1
2 AEROSOL, METERED RESPIRATORY (INHALATION) EVERY 6 HOURS PRN
Qty: 8.5 G | Refills: 0 | Status: SHIPPED | OUTPATIENT
Start: 2022-04-13

## 2022-04-13 RX ORDER — LOSARTAN POTASSIUM 25 MG/1
TABLET ORAL
Qty: 30 TABLET | Refills: 0 | Status: SHIPPED | OUTPATIENT
Start: 2022-04-13 | End: 2022-05-13

## 2022-04-13 RX ORDER — FLUTICASONE PROPIONATE 50 MCG
2 SPRAY, SUSPENSION (ML) NASAL DAILY
Qty: 16 G | Refills: 1 | Status: SHIPPED | OUTPATIENT
Start: 2022-04-13 | End: 2022-04-13 | Stop reason: SDUPTHER

## 2022-05-12 DIAGNOSIS — J30.9 ALLERGIC RHINITIS, UNSPECIFIED SEASONALITY, UNSPECIFIED TRIGGER: ICD-10-CM

## 2022-05-12 DIAGNOSIS — R65.10 SIRS (SYSTEMIC INFLAMMATORY RESPONSE SYNDROME) (HCC): ICD-10-CM

## 2022-05-13 RX ORDER — MONTELUKAST SODIUM 10 MG/1
TABLET ORAL
Qty: 30 TABLET | Refills: 0 | Status: SHIPPED | OUTPATIENT
Start: 2022-05-13 | End: 2022-05-14 | Stop reason: SDUPTHER

## 2022-05-13 RX ORDER — LOSARTAN POTASSIUM 25 MG/1
TABLET ORAL
Qty: 30 TABLET | Refills: 0 | Status: SHIPPED | OUTPATIENT
Start: 2022-05-13 | End: 2022-05-14 | Stop reason: SDUPTHER

## 2022-05-14 DIAGNOSIS — I10 ESSENTIAL HYPERTENSION: ICD-10-CM

## 2022-05-14 DIAGNOSIS — R65.10 SIRS (SYSTEMIC INFLAMMATORY RESPONSE SYNDROME) (HCC): ICD-10-CM

## 2022-05-14 DIAGNOSIS — J30.9 ALLERGIC RHINITIS, UNSPECIFIED SEASONALITY, UNSPECIFIED TRIGGER: ICD-10-CM

## 2022-05-15 RX ORDER — COLCHICINE 0.6 MG/1
0.6 TABLET ORAL DAILY
Qty: 30 TABLET | Refills: 3 | Status: SHIPPED | OUTPATIENT
Start: 2022-05-15 | End: 2022-08-04 | Stop reason: SDUPTHER

## 2022-05-16 RX ORDER — LOSARTAN POTASSIUM 25 MG/1
25 TABLET ORAL DAILY
Qty: 30 TABLET | Refills: 0 | Status: SHIPPED | OUTPATIENT
Start: 2022-05-16 | End: 2022-06-20 | Stop reason: SDUPTHER

## 2022-05-16 RX ORDER — AMLODIPINE BESYLATE 10 MG/1
10 TABLET ORAL DAILY
Qty: 30 TABLET | Refills: 0 | Status: SHIPPED | OUTPATIENT
Start: 2022-05-16 | End: 2022-06-20 | Stop reason: SDUPTHER

## 2022-05-16 RX ORDER — MONTELUKAST SODIUM 10 MG/1
10 TABLET ORAL
Qty: 30 TABLET | Refills: 0 | Status: SHIPPED | OUTPATIENT
Start: 2022-05-16 | End: 2022-06-20 | Stop reason: SDUPTHER

## 2022-05-20 ENCOUNTER — TELEPHONE (OUTPATIENT)
Dept: OBGYN CLINIC | Facility: HOSPITAL | Age: 42
End: 2022-05-20

## 2022-05-20 DIAGNOSIS — M10.9 ACUTE GOUT, UNSPECIFIED CAUSE, UNSPECIFIED SITE: Primary | ICD-10-CM

## 2022-05-20 RX ORDER — PREDNISONE 10 MG/1
TABLET ORAL
Qty: 32 TABLET | Refills: 0 | Status: SHIPPED | OUTPATIENT
Start: 2022-05-20 | End: 2022-06-05

## 2022-05-20 NOTE — TELEPHONE ENCOUNTER
Patient sees Dr Roselyn Mackay      Patients wife is calling to advise he is faving a flare up of his gout and would like a medication sent to his pharmacy        Patient enedelia Campos on file        CB: [phone]

## 2022-06-20 ENCOUNTER — TELEPHONE (OUTPATIENT)
Dept: OBGYN CLINIC | Facility: HOSPITAL | Age: 42
End: 2022-06-20

## 2022-06-20 DIAGNOSIS — J30.9 ALLERGIC RHINITIS, UNSPECIFIED SEASONALITY, UNSPECIFIED TRIGGER: ICD-10-CM

## 2022-06-20 DIAGNOSIS — I10 ESSENTIAL HYPERTENSION: ICD-10-CM

## 2022-06-20 DIAGNOSIS — R65.10 SIRS (SYSTEMIC INFLAMMATORY RESPONSE SYNDROME) (HCC): ICD-10-CM

## 2022-06-20 RX ORDER — LOSARTAN POTASSIUM 25 MG/1
25 TABLET ORAL DAILY
Qty: 90 TABLET | Refills: 1 | Status: SHIPPED | OUTPATIENT
Start: 2022-06-20

## 2022-06-20 RX ORDER — AMLODIPINE BESYLATE 10 MG/1
10 TABLET ORAL DAILY
Qty: 30 TABLET | Refills: 0 | Status: SHIPPED | OUTPATIENT
Start: 2022-06-20 | End: 2022-06-22 | Stop reason: SDUPTHER

## 2022-06-20 RX ORDER — MONTELUKAST SODIUM 10 MG/1
10 TABLET ORAL
Qty: 30 TABLET | Refills: 0 | Status: SHIPPED | OUTPATIENT
Start: 2022-06-20 | End: 2022-07-19 | Stop reason: SDUPTHER

## 2022-06-22 DIAGNOSIS — I10 ESSENTIAL HYPERTENSION: ICD-10-CM

## 2022-06-22 RX ORDER — AMLODIPINE BESYLATE 10 MG/1
10 TABLET ORAL DAILY
Qty: 30 TABLET | Refills: 2 | Status: SHIPPED | OUTPATIENT
Start: 2022-06-22

## 2022-06-22 NOTE — PROGRESS NOTES
Received faxed refill request from [facility] for amlodipine 10 mg daily in AM    Pt has upcoming routine follow up appt with me in August    Medication refilled

## 2022-06-30 DIAGNOSIS — J30.9 ALLERGIC RHINITIS, UNSPECIFIED SEASONALITY, UNSPECIFIED TRIGGER: ICD-10-CM

## 2022-06-30 RX ORDER — FLUTICASONE PROPIONATE 50 MCG
2 SPRAY, SUSPENSION (ML) NASAL DAILY
Qty: 16 G | Refills: 1 | Status: SHIPPED | OUTPATIENT
Start: 2022-06-30

## 2022-07-19 DIAGNOSIS — J30.9 ALLERGIC RHINITIS, UNSPECIFIED SEASONALITY, UNSPECIFIED TRIGGER: ICD-10-CM

## 2022-07-19 RX ORDER — MONTELUKAST SODIUM 10 MG/1
10 TABLET ORAL
Qty: 30 TABLET | Refills: 0 | Status: SHIPPED | OUTPATIENT
Start: 2022-07-19 | End: 2022-09-16 | Stop reason: SDUPTHER

## 2022-08-03 ENCOUNTER — TELEPHONE (OUTPATIENT)
Dept: OBGYN CLINIC | Facility: HOSPITAL | Age: 42
End: 2022-08-03

## 2022-08-03 DIAGNOSIS — M10.9 ACUTE GOUT, UNSPECIFIED CAUSE, UNSPECIFIED SITE: Primary | ICD-10-CM

## 2022-08-03 RX ORDER — PREDNISONE 10 MG/1
TABLET ORAL
Qty: 32 TABLET | Refills: 0 | Status: SHIPPED | OUTPATIENT
Start: 2022-08-03 | End: 2022-08-19

## 2022-08-03 NOTE — TELEPHONE ENCOUNTER
DR Schmidt Ours  RE: Gout flare-up  CB: [phone]    Wife called stating that they are on vacation in [place] and patient is having gout flare-up and they are wondering if Prednisone can be sent to pharmacy below? Caller asked to speak to clinical team when script is sent      Andres  Phone #: [phone]

## 2022-08-04 DIAGNOSIS — R65.10 SIRS (SYSTEMIC INFLAMMATORY RESPONSE SYNDROME) (HCC): ICD-10-CM

## 2022-08-04 DIAGNOSIS — M1A.9XX0 CHRONIC GOUT WITHOUT TOPHUS, UNSPECIFIED CAUSE, UNSPECIFIED SITE: ICD-10-CM

## 2022-08-04 RX ORDER — ALLOPURINOL 100 MG/1
100 TABLET ORAL DAILY
Qty: 30 TABLET | Refills: 6 | Status: SHIPPED | OUTPATIENT
Start: 2022-08-04 | End: 2022-09-14 | Stop reason: SDUPTHER

## 2022-08-04 RX ORDER — COLCHICINE 0.6 MG/1
0.6 TABLET ORAL DAILY
Qty: 30 TABLET | Refills: 6 | Status: SHIPPED | OUTPATIENT
Start: 2022-08-04 | End: 2022-08-11 | Stop reason: SDUPTHER

## 2022-08-04 NOTE — TELEPHONE ENCOUNTER
Spoke with patient, he states he is still taking both medications, and has started taking the prednisone as prescribed

## 2022-08-04 NOTE — TELEPHONE ENCOUNTER
Okay, I want him to do the lab orders that are in the system as soon as he comes back from vacation/is over his current flare

## 2022-09-02 DIAGNOSIS — J30.9 ALLERGIC RHINITIS, UNSPECIFIED SEASONALITY, UNSPECIFIED TRIGGER: ICD-10-CM

## 2022-09-02 RX ORDER — FLUTICASONE PROPIONATE 50 MCG
SPRAY, SUSPENSION (ML) NASAL
Qty: 16 G | Refills: 1 | Status: SHIPPED | OUTPATIENT
Start: 2022-09-02

## 2022-09-12 DIAGNOSIS — I10 ESSENTIAL HYPERTENSION: ICD-10-CM

## 2022-09-16 DIAGNOSIS — I10 ESSENTIAL HYPERTENSION: ICD-10-CM

## 2022-09-16 DIAGNOSIS — J30.9 ALLERGIC RHINITIS, UNSPECIFIED SEASONALITY, UNSPECIFIED TRIGGER: ICD-10-CM

## 2022-09-16 RX ORDER — AMLODIPINE BESYLATE 10 MG/1
10 TABLET ORAL DAILY
Qty: 30 TABLET | Refills: 0 | Status: CANCELLED | OUTPATIENT
Start: 2022-09-16

## 2022-09-16 RX ORDER — AMLODIPINE BESYLATE 10 MG/1
TABLET ORAL
Qty: 30 TABLET | Refills: 2 | Status: SHIPPED | OUTPATIENT
Start: 2022-09-16

## 2022-09-19 RX ORDER — MONTELUKAST SODIUM 10 MG/1
10 TABLET ORAL
Qty: 30 TABLET | Refills: 0 | Status: SHIPPED | OUTPATIENT
Start: 2022-09-19 | End: 2022-10-14

## 2022-10-13 DIAGNOSIS — J30.9 ALLERGIC RHINITIS, UNSPECIFIED SEASONALITY, UNSPECIFIED TRIGGER: ICD-10-CM

## 2022-10-14 RX ORDER — MONTELUKAST SODIUM 10 MG/1
TABLET ORAL
Qty: 30 TABLET | Refills: 0 | Status: SHIPPED | OUTPATIENT
Start: 2022-10-14 | End: 2022-10-17 | Stop reason: SDUPTHER

## 2022-10-17 DIAGNOSIS — J30.9 ALLERGIC RHINITIS, UNSPECIFIED SEASONALITY, UNSPECIFIED TRIGGER: ICD-10-CM

## 2022-10-18 RX ORDER — MONTELUKAST SODIUM 10 MG/1
10 TABLET ORAL
Qty: 30 TABLET | Refills: 0 | Status: SHIPPED | OUTPATIENT
Start: 2022-10-18

## 2022-10-31 ENCOUNTER — OFFICE VISIT (OUTPATIENT)
Dept: FAMILY MEDICINE CLINIC | Facility: CLINIC | Age: 42
End: 2022-10-31

## 2022-10-31 ENCOUNTER — APPOINTMENT (OUTPATIENT)
Dept: LAB | Facility: CLINIC | Age: 42
End: 2022-10-31

## 2022-10-31 VITALS
BODY MASS INDEX: 50.37 KG/M2 | SYSTOLIC BLOOD PRESSURE: 132 MMHG | HEIGHT: 61 IN | TEMPERATURE: 97.7 F | HEART RATE: 102 BPM | RESPIRATION RATE: 18 BRPM | WEIGHT: 266.8 LBS | OXYGEN SATURATION: 97 % | DIASTOLIC BLOOD PRESSURE: 88 MMHG

## 2022-10-31 DIAGNOSIS — G47.33 OSA (OBSTRUCTIVE SLEEP APNEA): ICD-10-CM

## 2022-10-31 DIAGNOSIS — M25.572 CHRONIC PAIN OF BOTH ANKLES: ICD-10-CM

## 2022-10-31 DIAGNOSIS — I10 ESSENTIAL HYPERTENSION: ICD-10-CM

## 2022-10-31 DIAGNOSIS — Z11.59 NEED FOR HEPATITIS C SCREENING TEST: ICD-10-CM

## 2022-10-31 DIAGNOSIS — Z00.00 ANNUAL PHYSICAL EXAM: Primary | ICD-10-CM

## 2022-10-31 DIAGNOSIS — J30.9 ALLERGIC RHINITIS, UNSPECIFIED SEASONALITY, UNSPECIFIED TRIGGER: ICD-10-CM

## 2022-10-31 DIAGNOSIS — E66.01 CLASS 3 SEVERE OBESITY DUE TO EXCESS CALORIES WITH SERIOUS COMORBIDITY AND BODY MASS INDEX (BMI) OF 50.0 TO 59.9 IN ADULT (HCC): ICD-10-CM

## 2022-10-31 DIAGNOSIS — Z11.4 SCREENING FOR HIV (HUMAN IMMUNODEFICIENCY VIRUS): ICD-10-CM

## 2022-10-31 DIAGNOSIS — G89.29 CHRONIC PAIN OF BOTH ANKLES: ICD-10-CM

## 2022-10-31 DIAGNOSIS — M1A.0790 IDIOPATHIC CHRONIC GOUT OF ANKLE WITHOUT TOPHUS, UNSPECIFIED LATERALITY: ICD-10-CM

## 2022-10-31 DIAGNOSIS — M25.571 CHRONIC PAIN OF BOTH ANKLES: ICD-10-CM

## 2022-10-31 DIAGNOSIS — J45.20 MILD INTERMITTENT ASTHMA WITHOUT COMPLICATION: ICD-10-CM

## 2022-10-31 PROBLEM — G47.30 SLEEP APNEA: Status: RESOLVED | Noted: 2019-12-16 | Resolved: 2022-10-31

## 2022-10-31 PROBLEM — E66.813 CLASS 3 SEVERE OBESITY DUE TO EXCESS CALORIES WITH SERIOUS COMORBIDITY AND BODY MASS INDEX (BMI) OF 50.0 TO 59.9 IN ADULT (HCC): Status: ACTIVE | Noted: 2021-03-01

## 2022-10-31 LAB
25(OH)D3 SERPL-MCNC: 15.5 NG/ML (ref 30–100)
ALBUMIN SERPL BCP-MCNC: 4 G/DL (ref 3.5–5)
ALP SERPL-CCNC: 82 U/L (ref 46–116)
ALT SERPL W P-5'-P-CCNC: 113 U/L (ref 12–78)
ANION GAP SERPL CALCULATED.3IONS-SCNC: 3 MMOL/L (ref 4–13)
AST SERPL W P-5'-P-CCNC: 45 U/L (ref 5–45)
BASOPHILS # BLD AUTO: 0.08 THOUSANDS/ÂΜL (ref 0–0.1)
BASOPHILS NFR BLD AUTO: 1 % (ref 0–1)
BILIRUB SERPL-MCNC: 0.41 MG/DL (ref 0.2–1)
BUN SERPL-MCNC: 20 MG/DL (ref 5–25)
CALCIUM SERPL-MCNC: 9.8 MG/DL (ref 8.3–10.1)
CHLORIDE SERPL-SCNC: 107 MMOL/L (ref 96–108)
CHOLEST SERPL-MCNC: 224 MG/DL
CO2 SERPL-SCNC: 28 MMOL/L (ref 21–32)
CREAT SERPL-MCNC: 1.32 MG/DL (ref 0.6–1.3)
CRP SERPL QL: 7.1 MG/L
EOSINOPHIL # BLD AUTO: 0.2 THOUSAND/ÂΜL (ref 0–0.61)
EOSINOPHIL NFR BLD AUTO: 2 % (ref 0–6)
ERYTHROCYTE [DISTWIDTH] IN BLOOD BY AUTOMATED COUNT: 13.4 % (ref 11.6–15.1)
ERYTHROCYTE [SEDIMENTATION RATE] IN BLOOD: 30 MM/HOUR (ref 0–14)
GFR SERPL CREATININE-BSD FRML MDRD: 67 ML/MIN/1.73SQ M
GLUCOSE P FAST SERPL-MCNC: 107 MG/DL (ref 65–99)
HCT VFR BLD AUTO: 53.4 % (ref 36.5–49.3)
HCV AB SER QL: NORMAL
HDLC SERPL-MCNC: 43 MG/DL
HGB BLD-MCNC: 17.2 G/DL (ref 12–17)
IMM GRANULOCYTES # BLD AUTO: 0.06 THOUSAND/UL (ref 0–0.2)
IMM GRANULOCYTES NFR BLD AUTO: 1 % (ref 0–2)
LDLC SERPL CALC-MCNC: 152 MG/DL (ref 0–100)
LYMPHOCYTES # BLD AUTO: 2.66 THOUSANDS/ÂΜL (ref 0.6–4.47)
LYMPHOCYTES NFR BLD AUTO: 27 % (ref 14–44)
MCH RBC QN AUTO: 28.3 PG (ref 26.8–34.3)
MCHC RBC AUTO-ENTMCNC: 32.2 G/DL (ref 31.4–37.4)
MCV RBC AUTO: 88 FL (ref 82–98)
MONOCYTES # BLD AUTO: 0.66 THOUSAND/ÂΜL (ref 0.17–1.22)
MONOCYTES NFR BLD AUTO: 7 % (ref 4–12)
NEUTROPHILS # BLD AUTO: 6.14 THOUSANDS/ÂΜL (ref 1.85–7.62)
NEUTS SEG NFR BLD AUTO: 62 % (ref 43–75)
NONHDLC SERPL-MCNC: 181 MG/DL
NRBC BLD AUTO-RTO: 0 /100 WBCS
PLATELET # BLD AUTO: 326 THOUSANDS/UL (ref 149–390)
PMV BLD AUTO: 10.8 FL (ref 8.9–12.7)
POTASSIUM SERPL-SCNC: 4.4 MMOL/L (ref 3.5–5.3)
PROT SERPL-MCNC: 9 G/DL (ref 6.4–8.4)
RBC # BLD AUTO: 6.07 MILLION/UL (ref 3.88–5.62)
SODIUM SERPL-SCNC: 138 MMOL/L (ref 135–147)
TRIGL SERPL-MCNC: 144 MG/DL
TSH SERPL DL<=0.05 MIU/L-ACNC: 2.72 UIU/ML (ref 0.45–4.5)
URATE SERPL-MCNC: 9 MG/DL (ref 3.5–8.5)
WBC # BLD AUTO: 9.8 THOUSAND/UL (ref 4.31–10.16)

## 2022-10-31 RX ORDER — LORATADINE 10 MG/1
10 TABLET ORAL DAILY
Qty: 90 TABLET | Refills: 1 | Status: SHIPPED | OUTPATIENT
Start: 2022-10-31

## 2022-10-31 NOTE — ASSESSMENT & PLAN NOTE
BMI Counseling: Body mass index is 50 41 kg/m²  The BMI is above normal  Nutrition recommendations include decreasing portion sizes, encouraging healthy choices of fruits and vegetables, limiting drinks that contain sugar and moderation in carbohydrate intake  Exercise recommendations include moderate physical activity 150 minutes/week  Patient referred to weight management  Rationale for BMI follow-up plan is due to patient being overweight or obese  - Discussed eating smaller meals throughout the day  - Discussed non-wt bearing exercise

## 2022-10-31 NOTE — ASSESSMENT & PLAN NOTE
Rheumatology follow up later this week  - Complete blood work  - Continue allopurinol 100 mg daily, colchicine 0 6 mg daily

## 2022-10-31 NOTE — ASSESSMENT & PLAN NOTE
Weight loss would likely be beneficial  Tried PT and exercise in pool in past    - Referral to Weight Management  - Consider PT, states will speak with rheumatologist before making a decision

## 2022-10-31 NOTE — ASSESSMENT & PLAN NOTE
Reports nosebleeds with Zyrtec  Currently using Benadryl and Singulair, reports dry mouth, especially at night   - Consider switching to daily Claritin instead of Benadryl for fewer side effects   - Continue Singulair daily

## 2022-10-31 NOTE — PATIENT INSTRUCTIONS

## 2022-10-31 NOTE — ASSESSMENT & PLAN NOTE
BP at goal in office today: 132/88  - Continue amlodipine 10 mg daily, losartan 25 mg daily   - Recommend low-salt diet and daily physical activity

## 2022-10-31 NOTE — PROGRESS NOTES
[facility]    NAME: Miracle Calvo  AGE: 38 y o  SEX: male  : 1980     DATE: 10/31/2022     Assessment and Plan:     Problem List Items Addressed This Visit        Respiratory    Allergic rhinitis     Reports nosebleeds with Zyrtec  Currently using Benadryl and Singulair, reports dry mouth, especially at night   - Consider switching to daily Claritin instead of Benadryl for fewer side effects   - Continue Singulair daily  Relevant Medications    loratadine (CLARITIN) 10 mg tablet    Mild intermittent asthma without complication     Stable  - Continue Singulair 10 mg daily, albuterol PRN  EVE (obstructive sleep apnea)     Controlled with use of BiPAP nightly  - Continue plan as per Sleep Medicine  Relevant Orders    CBC and differential    Comprehensive metabolic panel       Cardiovascular and Mediastinum    Essential hypertension     BP at goal in office today: 132/88  - Continue amlodipine 10 mg daily, losartan 25 mg daily   - Recommend low-salt diet and daily physical activity  Musculoskeletal and Integument    Idiopathic chronic gout of ankle without tophus     Rheumatology follow up later this week  - Complete blood work  - Continue allopurinol 100 mg daily, colchicine 0 6 mg daily  Other    Chronic pain of both ankles     Weight loss would likely be beneficial  Tried PT and exercise in pool in past    - Referral to Weight Management  - Consider PT, states will speak with rheumatologist before making a decision  Relevant Orders    Vitamin D 25 hydroxy    Class 3 severe obesity due to excess calories with serious comorbidity and body mass index (BMI) of 50 0 to 59 9 in adult (HCC)     BMI Counseling: Body mass index is 50 41 kg/m²   The BMI is above normal  Nutrition recommendations include decreasing portion sizes, encouraging healthy choices of fruits and vegetables, limiting drinks that contain sugar and moderation in carbohydrate intake  Exercise recommendations include moderate physical activity 150 minutes/week  Patient referred to weight management  Rationale for BMI follow-up plan is due to patient being overweight or obese  - Discussed eating smaller meals throughout the day  - Discussed non-wt bearing exercise  Relevant Orders    Ambulatory Referral to Weight Management    CBC and differential    Comprehensive metabolic panel    Hemoglobin A1C    Lipid panel    TSH, 3rd generation with Free T4 reflex      Other Visit Diagnoses     Annual physical exam    -  Primary    Need for hepatitis C screening test        Relevant Orders    Hepatitis C Antibody ([facility])    Screening for HIV (human immunodeficiency virus)        Relevant Orders    HIV 1/2 Antigen/Antibody (4th Generation) w Reflex [facility]          Immunizations and preventive care screenings were discussed with patient today  Appropriate education was printed on patient's after visit summary  Counseling:  Dental Health: discussed importance of regular tooth brushing, flossing, and dental visits  · Exercise: the importance of regular exercise/physical activity was discussed  Recommend exercise 3-5 times per week for at least 30 minutes  BMI Counseling: Body mass index is 50 41 kg/m²  The BMI is above normal  Nutrition recommendations include decreasing portion sizes, encouraging healthy choices of fruits and vegetables, limiting drinks that contain sugar and moderation in carbohydrate intake  Exercise recommendations include moderate physical activity 150 minutes/week  Patient referred to weight management  Rationale for BMI follow-up plan is due to patient being overweight or obese  Depression Screening and Follow-up Plan: Patient was screened for depression during today's encounter  They screened negative with a PHQ-2 score of 0          Return in about 6 months (around 4/30/2023) for Follow up HTN, gout  Chief Complaint:     Chief Complaint   Patient presents with   • Physical Exam      History of Present Illness:     Adult Annual Physical   Thuc presented to the office for a comprehensive physical exam  The patient's main concern is chronic pain in both ankles  Pt declines PT at this time, has Rheumatology follow up later this week and plans to receive recommendations there before making any other decisions about treatment  Pt has not been prioritizing health due to time constraints due to illness in son  Diet and Physical Activity  · Diet/Nutrition: poor diet, does not eat fruit, does eat vegetables  Eating one meal at night, tends to overeat  · Exercise: walking 5 minutes per morning, aiming for 5000 steps per day  Depression Screening  PHQ-2/9 Depression Screening    Little interest or pleasure in doing things: 0 - not at all  Feeling down, depressed, or hopeless: 0 - not at all  PHQ-2 Score: 0  PHQ-2 Interpretation: Negative depression screen       General Health  · Sleep: sleeps well using a BiPAP machine, 7-8 hours per night  · Hearing: normal - bilateral   · Vision: goes for regular eye exams and wears glasses  "On [facility] glaucoma watch" [facility]  · Dental: no dental visits for >1 year, denies dental problems   Health  · Symptoms include: nocturia x1 each night  Review of Systems:     Review of Systems   Constitutional: Positive for fatigue  Negative for fever and unexpected weight change  HENT: Positive for congestion  Negative for hearing loss, rhinorrhea, sore throat and trouble swallowing  Eyes: Positive for itching  Negative for visual disturbance  Respiratory: Negative for cough, chest tightness, shortness of breath and wheezing  Gastrointestinal: Positive for nausea (if eats breakfast)  Negative for abdominal pain, blood in stool, constipation and vomiting  Genitourinary: Negative for difficulty urinating  Musculoskeletal: Positive for arthralgias  Allergic/Immunologic: Positive for environmental allergies  Neurological: Positive for headaches  Negative for dizziness  Psychiatric/Behavioral: Negative for dysphoric mood and sleep disturbance  The patient is not nervous/anxious  All other systems reviewed and are negative       Past Medical History:     Past Medical History:   Diagnosis Date   • Ankle swelling    • Arthritis    • Asthma    • Dizziness    • Heartburn    • Hypertension    • Increased frequency of urination    • Intolerance to heat    • Joint pain     and joint swelling   • Morbid [facility] with BMI of 50 0-59 9, [facility] 12/16/2019   • Sleep apnea    • SOB (shortness of breath)       Past Surgical History:     Past Surgical History:   Procedure Laterality Date   • IR ASPIRATION JOINT (SPECIFY LOCATION)  2/16/2022      Family History:     Family History   Problem Relation Age of Onset   • Hypertension Mother    • Throat cancer Father    • No Known Problems Sister    • No Known Problems Sister    • Hypertension Sister    • Diabetes Neg Hx    • Heart disease Neg Hx    • Stroke Neg Hx    • Thyroid disease Neg Hx       Social History:     Social History     Socioeconomic History   • Marital status: /Civil Union     Spouse name: None   • Number of children: None   • Years of education: None   • Highest education level: None   Occupational History   • None   Tobacco Use   • Smoking status: Former Smoker   • Smokeless tobacco: Never Used   Vaping Use   • Vaping Use: Never used   Substance and Sexual Activity   • Alcohol use: Never   • Drug use: No   • Sexual activity: None   Other Topics Concern   • None   Social History Narrative   • None     Social Determinants of Health     Financial Resource Strain: Low Risk    • Difficulty of Paying Living Expenses: Not hard at all   Food Insecurity: No Food Insecurity   • Worried About Running Out of Food in the Last Year: Never true   • Ran Out of Food in the Last Year: Never true   Transportation Needs: No Transportation Needs   • Lack of Transportation (Medical): No   • Lack of Transportation (Non-Medical): No   Physical Activity: Not on file   Stress: Not on file   Social Connections: Not on file   Intimate Partner Violence: Not on file   Housing Stability: Low Risk    • Unable to Pay for Housing in the Last Year: No   • Number of Places Lived in the Last Year: 1   • Unstable Housing in the Last Year: No      Current Medications:     Current Outpatient Medications   Medication Sig Dispense Refill   • amLODIPine (NORVASC) 10 mg tablet TAKE 1 TABLET(10 MG) BY MOUTH DAILY 30 tablet 2   • loratadine (CLARITIN) 10 mg tablet Take 1 tablet (10 mg total) by mouth daily 90 tablet 1   • losartan (COZAAR) 25 mg tablet Take 1 tablet (25 mg total) by mouth daily 90 tablet 1   • albuterol (PROVENTIL HFA,VENTOLIN HFA) 90 mcg/act inhaler Inhale 2 puffs every 6 (six) hours as needed for wheezing 8 5 g 0   • allopurinol (ZYLOPRIM) 100 mg tablet Take 1 tablet (100 mg total) by mouth daily 30 tablet 6   • aspirin (ECOTRIN LOW STRENGTH) 81 mg EC tablet Take 1 tablet (81 mg total) by mouth daily 90 tablet 1   • colchicine (COLCRYS) 0 6 mg tablet Take 1 tablet (0 6 mg total) by mouth daily 30 tablet 6   • fluticasone (FLONASE) 50 mcg/act nasal spray SHAKE LIQUID AND USE 2 SPRAYS IN EACH NOSTRIL DAILY 16 g 1   • montelukast (SINGULAIR) 10 mg tablet Take 1 tablet (10 mg total) by mouth daily at bedtime 30 tablet 0     No current facility-administered medications for this visit  Allergies:     No Known Allergies   Physical Exam:     /88 (BP Location: Left arm, Patient Position: Sitting, Cuff Size: Standard)   Pulse 102   Temp 97 7 °F (36 5 °C) (Temporal)   Resp 18   Ht 5' 1" (1 549 m)   Wt 121 kg (266 lb 12 8 oz)   SpO2 97%   BMI 50 41 kg/m²     Physical Exam  Vitals reviewed  Constitutional:       General: He is not in acute distress       Appearance: He is morbidly obese  He is not ill-appearing or diaphoretic  HENT:      Head: Normocephalic and atraumatic  Right Ear: Ear canal and external ear normal  A middle ear effusion is present  Left Ear: Ear canal and external ear normal  A middle ear effusion is present  Nose: Nose normal       Mouth/Throat:      Mouth: Mucous membranes are moist       Pharynx: Oropharynx is clear  Eyes:      General: Lids are normal       Conjunctiva/sclera: Conjunctivae normal       Pupils: Pupils are equal, round, and reactive to light  Neck:      Thyroid: No thyromegaly or thyroid tenderness  Cardiovascular:      Rate and Rhythm: Normal rate and regular rhythm  Pulses: Normal pulses  Heart sounds: Normal heart sounds  No murmur heard  Pulmonary:      Effort: Pulmonary effort is normal  No tachypnea  Breath sounds: Normal breath sounds  No decreased breath sounds or wheezing  Abdominal:      General: Bowel sounds are normal  There is no distension  Palpations: Abdomen is soft  Tenderness: There is no abdominal tenderness  There is no guarding  Musculoskeletal:      Right lower leg: No edema  Left lower leg: No edema  Left ankle: Normal    Lymphadenopathy:      Cervical: No cervical adenopathy  Skin:     General: Skin is warm and dry  Neurological:      Mental Status: He is alert and oriented to person, place, and time     Psychiatric:         Mood and Affect: Mood and affect normal           Steve Villaseñor, [id number] [facility]

## 2022-11-01 LAB
EST. AVERAGE GLUCOSE BLD GHB EST-MCNC: 117 MG/DL
HBA1C MFR BLD: 5.7 %
HIV 1+2 AB+HIV1 P24 AG SERPL QL IA: NORMAL

## 2022-11-02 DIAGNOSIS — E55.9 VITAMIN D DEFICIENCY: Primary | ICD-10-CM

## 2022-11-02 RX ORDER — ERGOCALCIFEROL 1.25 MG/1
50000 CAPSULE ORAL WEEKLY
Qty: 4 CAPSULE | Refills: 2 | Status: SHIPPED | OUTPATIENT
Start: 2022-11-02 | End: 2023-01-19

## 2022-11-08 DIAGNOSIS — J30.9 ALLERGIC RHINITIS, UNSPECIFIED SEASONALITY, UNSPECIFIED TRIGGER: ICD-10-CM

## 2022-11-09 ENCOUNTER — OFFICE VISIT (OUTPATIENT)
Dept: RHEUMATOLOGY | Facility: CLINIC | Age: 42
End: 2022-11-09

## 2022-11-09 VITALS
DIASTOLIC BLOOD PRESSURE: 90 MMHG | BODY MASS INDEX: 50.22 KG/M2 | WEIGHT: 266 LBS | SYSTOLIC BLOOD PRESSURE: 134 MMHG | HEIGHT: 61 IN

## 2022-11-09 DIAGNOSIS — M1A.9XX0 CHRONIC GOUT WITHOUT TOPHUS, UNSPECIFIED CAUSE, UNSPECIFIED SITE: Primary | ICD-10-CM

## 2022-11-09 DIAGNOSIS — E55.9 VITAMIN D DEFICIENCY: ICD-10-CM

## 2022-11-09 DIAGNOSIS — R65.10 SIRS (SYSTEMIC INFLAMMATORY RESPONSE SYNDROME) (HCC): ICD-10-CM

## 2022-11-09 RX ORDER — COLCHICINE 0.6 MG/1
0.6 TABLET ORAL DAILY
Qty: 90 TABLET | Refills: 3 | Status: SHIPPED | OUTPATIENT
Start: 2022-11-09

## 2022-11-09 RX ORDER — ALLOPURINOL 100 MG/1
200 TABLET ORAL DAILY
Qty: 180 TABLET | Refills: 3 | Status: SHIPPED | OUTPATIENT
Start: 2022-11-09

## 2022-11-09 RX ORDER — FLUTICASONE PROPIONATE 50 MCG
SPRAY, SUSPENSION (ML) NASAL
Qty: 16 G | Refills: 1 | Status: SHIPPED | OUTPATIENT
Start: 2022-11-09

## 2022-11-09 NOTE — PROGRESS NOTES
Assessment and Plan: Thuc Ulysses Mulberry is a 38 y o   male who presents for follow up  Patient has a history of Gout  Last flare up was in august, attributed to dietary indiscretions , involving the right knee, second time was noted to compromise the right ankle, patient states medication compliance : allopurinol + colchicine daily and prednisone for the flare ups recently, thankfully the severity of his flare ups did not warranted hospital admission  Patient most recent uric acid is 9 0  Sed rate is 30, labs also revealed low vit D , patient is currently on supplementation per his pcp  Taking in consideration the above, will increase allopurinol dose to twice a day, continue colchicine, diet recommendations provided, instructed to notify to the office if he experience any other flare ups, repeat labs  Plan:  Diagnoses and all orders for this visit:    Chronic gout without tophus, unspecified cause, unspecified site  -     allopurinol (ZYLOPRIM) 100 mg tablet; Take 2 tablets (200 mg total) by mouth daily  -     CBC and differential  -     Comprehensive metabolic panel  -     C-reactive protein  -     Sedimentation rate, automated  -     Uric acid    SIRS secondary to gout  -     colchicine (COLCRYS) 0 6 mg tablet; Take 1 tablet (0 6 mg total) by mouth daily    Vitamin D deficiency  -     Vitamin D 25 hydroxy        Follow-up plan: RTC 6 months  Rheumatic Disease Summary  Initial visit 2/23/22: Ana Ortiz is a 46 y o   male who presents as a Rheumatology consult referred by No ref  provider found for evaluation of gout  Patient had joint pain in left elbow, right knee, bilateral ankle  Patient had left knee arthrocentesis and revealed monosodium urate crystals  Joint pain is likely due to gout     Start allopurinol 100mg daily  Continue colchicine daily  Take prednisone 30mg daily for 3 days, then 20mg daily for 3 days, then 10mg daily for 3 days, then 5mg daily for 4 days  Call with any gout flare  Do labs in 1 month    Chief Complaint  Follow up visit    MEAGHAN Nelson is a 38 y o   male who presents for follow up  Last clinic visit was 2/23/22 which was his initial visit  Patient has been evaluated and treated for gout , today here for a follow up visit, lab review and discussion of flare ups in the past months  At the moment of this encounter patient asymptomatic  The following portions of the patient's history were reviewed and updated as appropriate: allergies, current medications, past family history, past medical history, past social history, past surgical history and problem list     Review of Systems:   Review of Systems   Musculoskeletal: Positive for arthralgias and joint swelling  All other systems reviewed and are negative  Reviewed and agree      Home Medications:    Current Outpatient Medications:   •  amLODIPine (NORVASC) 10 mg tablet, TAKE 1 TABLET(10 MG) BY MOUTH DAILY, Disp: 30 tablet, Rfl: 2  •  losartan (COZAAR) 25 mg tablet, Take 1 tablet (25 mg total) by mouth daily, Disp: 90 tablet, Rfl: 1  •  albuterol (PROVENTIL HFA,VENTOLIN HFA) 90 mcg/act inhaler, Inhale 2 puffs every 6 (six) hours as needed for wheezing, Disp: 8 5 g, Rfl: 0  •  allopurinol (ZYLOPRIM) 100 mg tablet, Take 1 tablet (100 mg total) by mouth daily, Disp: 30 tablet, Rfl: 6  •  aspirin (ECOTRIN LOW STRENGTH) 81 mg EC tablet, Take 1 tablet (81 mg total) by mouth daily, Disp: 90 tablet, Rfl: 1  •  colchicine (COLCRYS) 0 6 mg tablet, Take 1 tablet (0 6 mg total) by mouth daily, Disp: 30 tablet, Rfl: 6  •  ergocalciferol (VITAMIN D2) 50,000 units, Take 1 capsule (50,000 Units total) by mouth once a week for 12 doses, Disp: 4 capsule, Rfl: 2  •  fluticasone (FLONASE) 50 mcg/act nasal spray, SHAKE LIQUID AND USE 2 SPRAYS IN EACH NOSTRIL DAILY, Disp: 16 g, Rfl: 1  •  loratadine (CLARITIN) 10 mg tablet, Take 1 tablet (10 mg total) by mouth daily, Disp: 90 tablet, Rfl: 1  •  montelukast (SINGULAIR) 10 mg tablet, Take 1 tablet (10 mg total) by mouth daily at bedtime, Disp: 30 tablet, Rfl: 0    Objective:    Vitals:    11/09/22 1045   BP: 134/90   Weight: 121 kg (266 lb)   Height: 5' 1" (1 549 m)       Physical Exam  Constitutional:       General: He is not in acute distress  Appearance: Normal appearance  He is not ill-appearing, toxic-appearing or diaphoretic  HENT:      Head: Normocephalic and atraumatic  Right Ear: Tympanic membrane normal       Left Ear: Tympanic membrane normal       Nose: Nose normal       Mouth/Throat:      Mouth: Mucous membranes are moist    Eyes:      Extraocular Movements: Extraocular movements intact  Conjunctiva/sclera: Conjunctivae normal       Pupils: Pupils are equal, round, and reactive to light  Cardiovascular:      Rate and Rhythm: Normal rate  Pulses: Normal pulses  Heart sounds: Normal heart sounds  Pulmonary:      Effort: Pulmonary effort is normal       Breath sounds: Normal breath sounds  Abdominal:      General: Bowel sounds are normal       Palpations: Abdomen is soft  Musculoskeletal:         General: No swelling or tenderness  Normal range of motion  Cervical back: Normal range of motion  Comments: Ganglion cyst 1 cm compromising left wrist   Skin:     General: Skin is warm  Capillary Refill: Capillary refill takes 2 to 3 seconds  Neurological:      Mental Status: He is alert and oriented to person, place, and time  Psychiatric:         Mood and Affect: Mood normal          Behavior: Behavior normal          Thought Content: Thought content normal          Judgment: Judgment normal        Reviewed labs and imaging  Imaging:   XR chest portable 2/18/2022  Impression: No acute cardiopulmonary disease       XR knee 4+ vw right injury 2/16/2022  Impression: Minimal degenerative change    Small Suprapatellar joint effusion       VAS lower limb venous duplex study, unilateral/limited 2/16/2022  Narrative:  [facility]PORT [facility]: Indications: Patient c/o right posterior knee pain for 4 days  No history of DVT/PE/CA/recent surgery or trauma  CONCLUSION: Impression: RIGHT LOWER LIMB No evidence of acute or chronic deep vein thrombosis   No evidence of superficial thrombophlebitis noted  LEFT LOWER LIMB LIMITED Evaluation shows no evidence of thrombus in the common femoral vein  Doppler evaluation shows a normal response to augmentation maneuvers        Labs:   Appointment on 10/31/2022   Component Date Value Ref Range Status   • WBC 10/31/2022 9 80  4 31 - 10 16 Thousand/uL Final   • RBC 10/31/2022 6 07 (A) 3 88 - 5 62 Million/uL Final   • Hemoglobin 10/31/2022 17 2 (A) 12 0 - 17 0 g/dL Final   • Hematocrit 10/31/2022 53 4 (A) 36 5 - 49 3 % Final   • MCV 10/31/2022 88  82 - 98 fL Final   • MCH 10/31/2022 28 3  26 8 - 34 3 pg Final   • MCHC 10/31/2022 32 2  31 4 - 37 4 g/dL Final   • RDW 10/31/2022 13 4  11 6 - 15 1 % Final   • MPV 10/31/2022 10 8  8 9 - 12 7 fL Final   • Platelets 35/10/0249 326  149 - 390 Thousands/uL Final   • nRBC 10/31/2022 0  /100 WBCs Final   • Neutrophils Relative 10/31/2022 62  43 - 75 % Final   • Immat GRANS % 10/31/2022 1  0 - 2 % Final   • Lymphocytes Relative 10/31/2022 27  14 - 44 % Final   • Monocytes Relative 10/31/2022 7  4 - 12 % Final   • Eosinophils Relative 10/31/2022 2  0 - 6 % Final   • Basophils Relative 10/31/2022 1  0 - 1 % Final   • Neutrophils Absolute 10/31/2022 6 14  1 85 - 7 62 Thousands/µL Final   • Immature Grans Absolute 10/31/2022 0 06  0 00 - 0 20 Thousand/uL Final   • Lymphocytes Absolute 10/31/2022 2 66  0 60 - 4 47 Thousands/µL Final   • Monocytes Absolute 10/31/2022 0 66  0 17 - 1 22 Thousand/µL Final   • Eosinophils Absolute 10/31/2022 0 20  0 00 - 0 61 Thousand/µL Final   • Basophils Absolute 10/31/2022 0 08  0 00 - 0 10 Thousands/µL Final   • Sodium 10/31/2022 138  135 - 147 mmol/L Final   • Potassium 10/31/2022 4 4  3 5 - 5 3 mmol/L Final   • Chloride 10/31/2022 107  96 - 108 mmol/L Final   • CO2 10/31/2022 28  21 - 32 mmol/L Final   • ANION GAP 10/31/2022 3 (A) 4 - 13 mmol/L Final   • BUN 10/31/2022 20  5 - 25 mg/dL Final   • Creatinine 10/31/2022 1 32 (A) 0 60 - 1 30 mg/dL Final    Standardized to IDMS reference method   • Glucose, Fasting 10/31/2022 107 (A) 65 - 99 mg/dL Final    Specimen collection should occur prior to Sulfasalazine administration due to the potential for falsely depressed results  Specimen collection should occur prior to Sulfapyridine administration due to the potential for falsely elevated results  • Calcium 10/31/2022 9 8  8 3 - 10 1 mg/dL Final   • AST 10/31/2022 45  5 - 45 U/L Final    Specimen collection should occur prior to Sulfasalazine administration due to the potential for falsely depressed results  • ALT 10/31/2022 113 (A) 12 - 78 U/L Final    Specimen collection should occur prior to Sulfasalazine and/or Sulfapyridine administration due to the potential for falsely depressed results  • Alkaline Phosphatase 10/31/2022 82  46 - 116 U/L Final   • Total Protein 10/31/2022 9 0 (A) 6 4 - 8 4 g/dL Final   • Albumin 10/31/2022 4 0  3 5 - 5 0 g/dL Final   • Total Bilirubin 10/31/2022 0 41  0 20 - 1 00 mg/dL Final    Use of this assay is not recommended for patients undergoing treatment with eltrombopag due to the potential for falsely elevated results  • eGFR 10/31/2022 67  ml/min/1 73sq m Final   • Hemoglobin A1C 10/31/2022 5 7 (A) Normal 3 8-5 6%; PreDiabetic 5 7-6 4%;  Diabetic >=6 5%; Glycemic control for adults with diabetes <7 0% % Final   • EAG 10/31/2022 117  mg/dl Final   • Cholesterol 10/31/2022 224 (A) See Comment mg/dL Final    Cholesterol:         Pediatric <18 Years        Desirable          <170 mg/dL      Borderline High    170-199 mg/dL      High               >=200 mg/dL        Adult >=18 Years            Desirable         <200 mg/dL      Borderline High   200-239 mg/dL      High              >239 mg/dL     • Triglycerides 10/31/2022 144  See Comment mg/dL Final    Triglyceride:     0-9Y            <75mg/dL     10Y-17Y         <90 mg/dL       >=18Y     Normal          <150 mg/dL     Borderline High 150-199 mg/dL     High            200-499 mg/dL        Very High       >499 mg/dL    Specimen collection should occur prior to N-Acetylcysteine or Metamizole administration due to the potential for falsely depressed results  • HDL, Direct 10/31/2022 43  >=40 mg/dL Final    Specimen collection should occur prior to Metamizole administration due to the potential for falsley depressed results  • LDL Calculated 10/31/2022 152 (A) 0 - 100 mg/dL Final    LDL Cholesterol:     Optimal           <100 mg/dl     Near Optimal      100-129 mg/dl     Above Optimal       Borderline High 130-159 mg/dl       High            160-189 mg/dl       Very High       >189 mg/dl         This screening LDL is a calculated result  It does not have the accuracy of the Direct Measured LDL in the monitoring of patients with hyperlipidemia and/or statin therapy  Direct Measure LDL (DCL942) must be ordered separately in these patients  • Non-HDL-Chol (CHOL-HDL) 10/31/2022 181  mg/dl Final   • TSH 3RD GENERATON 10/31/2022 2 720  0 450 - 4 500 uIU/mL Final    Adult TSH (3rd generation) reference range follows the recommended guidelines of the American Thyroid Association, January, 2020     • Vit D, 25-Hydroxy 10/31/2022 15 5 (A) 30 0 - 100 0 ng/mL Final   • HIV-1/HIV-2 Ab 10/31/2022 Non-Reactive  Non-Reactive Final   • Hepatitis C Ab 10/31/2022 Non-reactive  Non-reactive Final

## 2022-11-09 NOTE — PATIENT INSTRUCTIONS
Increase allopurinol to 200mg daily  Continue colchicine 0 6mg daily  Do labs before next visit  Call with any flare-up symptoms    Return to clinic in 6 months

## 2022-11-18 DIAGNOSIS — I10 ESSENTIAL HYPERTENSION: ICD-10-CM

## 2022-11-18 RX ORDER — AMLODIPINE BESYLATE 10 MG/1
TABLET ORAL
Qty: 30 TABLET | Refills: 2 | OUTPATIENT
Start: 2022-11-18

## 2022-11-30 ENCOUNTER — TELEPHONE (OUTPATIENT)
Dept: OBGYN CLINIC | Facility: MEDICAL CENTER | Age: 42
End: 2022-11-30

## 2022-11-30 NOTE — TELEPHONE ENCOUNTER
Caller: Patient    DR Iniguez  RE: Gout flare-up    Reason for caller:     Patient is having gout flare-up and requesting  Prednisone to be sent to pharmacy    He uses [facility] in [facility]      Phone #: [phone]

## 2022-12-01 DIAGNOSIS — R65.10 SIRS (SYSTEMIC INFLAMMATORY RESPONSE SYNDROME) (HCC): ICD-10-CM

## 2022-12-01 RX ORDER — COLCHICINE 0.6 MG/1
0.6 TABLET ORAL DAILY
Qty: 90 TABLET | Refills: 3 | Status: SHIPPED | OUTPATIENT
Start: 2022-12-01

## 2022-12-01 NOTE — TELEPHONE ENCOUNTER
Caller: Brittney Baker    Doctor: Yany Alston    Reason for call: checking on status of previous message    Patient is have a flare up    Call back#: [phone]

## 2022-12-03 DIAGNOSIS — M10.9 ACUTE GOUT, UNSPECIFIED CAUSE, UNSPECIFIED SITE: Primary | ICD-10-CM

## 2022-12-03 RX ORDER — PREDNISONE 10 MG/1
TABLET ORAL
Qty: 32 TABLET | Refills: 0 | Status: SHIPPED | OUTPATIENT
Start: 2022-12-03 | End: 2022-12-19

## 2022-12-03 NOTE — TELEPHONE ENCOUNTER
Please let them know I sent a prednisone taper to his pharmacy, and confirm that he has gone up on his allopurinol to 200mg daily and continues colchicine once a day

## 2023-01-06 DIAGNOSIS — J30.9 ALLERGIC RHINITIS, UNSPECIFIED SEASONALITY, UNSPECIFIED TRIGGER: ICD-10-CM

## 2023-01-09 RX ORDER — FLUTICASONE PROPIONATE 50 MCG
SPRAY, SUSPENSION (ML) NASAL
Qty: 16 G | Refills: 1 | Status: SHIPPED | OUTPATIENT
Start: 2023-01-09

## 2023-01-16 DIAGNOSIS — E55.9 VITAMIN D DEFICIENCY: ICD-10-CM

## 2023-01-16 RX ORDER — ERGOCALCIFEROL 1.25 MG/1
CAPSULE ORAL
Qty: 4 CAPSULE | Refills: 2 | Status: SHIPPED | OUTPATIENT
Start: 2023-01-16

## 2023-01-17 ENCOUNTER — OFFICE VISIT (OUTPATIENT)
Dept: SLEEP CENTER | Facility: CLINIC | Age: 43
End: 2023-01-17

## 2023-01-17 VITALS
HEIGHT: 61 IN | HEART RATE: 91 BPM | BODY MASS INDEX: 51.62 KG/M2 | DIASTOLIC BLOOD PRESSURE: 68 MMHG | WEIGHT: 273.4 LBS | OXYGEN SATURATION: 99 % | SYSTOLIC BLOOD PRESSURE: 118 MMHG

## 2023-01-17 DIAGNOSIS — G47.33 OSA (OBSTRUCTIVE SLEEP APNEA): Primary | ICD-10-CM

## 2023-01-17 NOTE — PROGRESS NOTES
Progress Note - Sleep Center   Jairo Camara :1980 MRN: [id number]      Reason for Visit:  38 y o male here for annual follow-up    Assessment:  Doing well on current therapy of BiPAP 25/20 cm for severe EVE (AHI = 134 0)  Plan:  Continue same  The only complaint is difficulty with mask fit  I recommended moleskin for the bridge of his nose or perhaps a gel piece that will provide friction and build up the height of his bridge  Follow up: One year    History of Present Illness:  History of EVE on PAP therapy  Fully compliant and deriving benefit      Historical Information    Past Medical History:   Past Medical History:   Diagnosis Date   • Ankle swelling    • Arthritis    • Asthma    • Dizziness    • Heartburn    • Hypertension    • Increased frequency of urination    • Intolerance to heat    • Joint pain     and joint swelling   • Morbid obesity with BMI of 50 0-59 9, adult ([facility]) 2019   • Sleep apnea    • SOB (shortness of breath)          Past Surgical History:   Past Surgical History:   Procedure Laterality Date   • IR ASPIRATION JOINT (SPECIFY LOCATION)  2022       Social History:   Social History     Socioeconomic History   • Marital status: /Civil Union     Spouse name: Not on file   • Number of children: Not on file   • Years of education: Not on file   • Highest education level: Not on file   Occupational History   • Not on file   Tobacco Use   • Smoking status: Former   • Smokeless tobacco: Never   Vaping Use   • Vaping Use: Never used   Substance and Sexual Activity   • Alcohol use: Never   • Drug use: No   • Sexual activity: Not on file   Other Topics Concern   • Not on file   Social History Narrative   • Not on file     Social Determinants of Health     Financial Resource Strain: Low Risk    • Difficulty of Paying Living Expenses: Not hard at all   Food Insecurity: No Food Insecurity   • Worried About Running Out of Food in the Last Year: Never true   • Ran Out of Food in the Last Year: Never true   Transportation Needs: No Transportation Needs   • Lack of Transportation (Medical): No   • Lack of Transportation (Non-Medical):  No   Physical Activity: Not on file   Stress: Not on file   Social Connections: Not on file   Intimate Partner Violence: Not on file   Housing Stability: Low Risk    • Unable to Pay for Housing in the Last Year: No   • Number of Places Lived in the Last Year: 1   • Unstable Housing in the Last Year: No       Family History:   Family History   Problem Relation Age of Onset   • Hypertension Mother    • Throat cancer Father    • No Known Problems Sister    • No Known Problems Sister    • Hypertension Sister    • Diabetes Neg Hx    • Heart disease Neg Hx    • Stroke Neg Hx    • Thyroid disease Neg Hx        Medications/Allergies:      Current Outpatient Medications:   •  albuterol (PROVENTIL HFA,VENTOLIN HFA) 90 mcg/act inhaler, Inhale 2 puffs every 6 (six) hours as needed for wheezing, Disp: 8 5 g, Rfl: 0  •  allopurinol (ZYLOPRIM) 100 mg tablet, Take 2 tablets (200 mg total) by mouth daily, Disp: 180 tablet, Rfl: 3  •  amLODIPine (NORVASC) 10 mg tablet, Take 1 tablet (10 mg total) by mouth daily, Disp: 30 tablet, Rfl: 2  •  aspirin (ECOTRIN LOW STRENGTH) 81 mg EC tablet, Take 1 tablet (81 mg total) by mouth daily, Disp: 90 tablet, Rfl: 1  •  colchicine (COLCRYS) 0 6 mg tablet, Take 1 tablet (0 6 mg total) by mouth daily, Disp: 90 tablet, Rfl: 3  •  ergocalciferol (VITAMIN D2) 50,000 units, TAKE 1 CAPSULE BY MOUTH 1 TIME A WEEK FOR 12 DOSES, Disp: 4 capsule, Rfl: 2  •  fluticasone (FLONASE) 50 mcg/act nasal spray, SHAKE LIQUID AND USE 2 SPRAYS IN EACH NOSTRIL DAILY, Disp: 16 g, Rfl: 1  •  loratadine (CLARITIN) 10 mg tablet, Take 1 tablet (10 mg total) by mouth daily, Disp: 90 tablet, Rfl: 1  •  losartan (COZAAR) 25 mg tablet, TAKE 1 TABLET(25 MG) BY MOUTH DAILY, Disp: 90 tablet, Rfl: 2  •  montelukast (SINGULAIR) 10 mg tablet, TAKE 1 TABLET(10 MG) BY MOUTH DAILY AT BEDTIME, Disp: 90 tablet, Rfl: 1          Objective      Vital Signs:   Vitals:    01/17/23 0947   BP: 118/68   Pulse: 91   SpO2: 99%     [place] Sleepiness Scale: Total score: 7        Physical Exam:    General: Alert, appropriate, cooperative, overweight    Head: NC/AT    Skin: Warm, dry    Neuro: No motor abnormalities, cranial nerves appear intact    Extremity: No clubbing, cyanosis      DME Provider: [facility]        Counseling / Coordination of Care   I have spent 15 minutes with the patient today in which greater than 50% of this time was spent in counseling/coordination of care regarding: equipment and compliance  Board Certified Sleep Specialist    Portions of the record may have been created with voice recognition software  Occasional wrong word or "sound a like" substitutions may have occurred due to the inherent limitations of voice recognition software  Read the chart carefully and recognize, using context, where substitutions have occurred

## 2023-01-18 ENCOUNTER — TELEPHONE (OUTPATIENT)
Dept: SLEEP CENTER | Facility: CLINIC | Age: 43
End: 2023-01-18

## 2023-01-18 LAB

## 2023-02-15 DIAGNOSIS — J30.9 ALLERGIC RHINITIS, UNSPECIFIED SEASONALITY, UNSPECIFIED TRIGGER: ICD-10-CM

## 2023-02-15 RX ORDER — MONTELUKAST SODIUM 10 MG/1
TABLET ORAL
Qty: 90 TABLET | Refills: 1 | Status: SHIPPED | OUTPATIENT
Start: 2023-02-15

## 2023-03-06 DIAGNOSIS — J30.9 ALLERGIC RHINITIS, UNSPECIFIED SEASONALITY, UNSPECIFIED TRIGGER: ICD-10-CM

## 2023-03-06 DIAGNOSIS — I10 ESSENTIAL HYPERTENSION: ICD-10-CM

## 2023-03-06 RX ORDER — FLUTICASONE PROPIONATE 50 MCG
SPRAY, SUSPENSION (ML) NASAL
Qty: 16 G | Refills: 1 | Status: SHIPPED | OUTPATIENT
Start: 2023-03-06

## 2023-03-06 RX ORDER — AMLODIPINE BESYLATE 10 MG/1
TABLET ORAL
Qty: 30 TABLET | Refills: 2 | Status: SHIPPED | OUTPATIENT
Start: 2023-03-06

## 2023-04-05 DIAGNOSIS — E55.9 VITAMIN D DEFICIENCY: ICD-10-CM

## 2023-04-05 RX ORDER — ERGOCALCIFEROL 1.25 MG/1
CAPSULE ORAL
Qty: 4 CAPSULE | Refills: 2 | Status: SHIPPED | OUTPATIENT
Start: 2023-04-05

## 2023-05-01 ENCOUNTER — TELEPHONE (OUTPATIENT)
Dept: FAMILY MEDICINE CLINIC | Facility: CLINIC | Age: 43
End: 2023-05-01

## 2023-05-01 NOTE — TELEPHONE ENCOUNTER
Hello, I was calling on behalf of my   His name is [facility] last names Misty [place]town  YOB: 1980 and my cell phone number is [phone]  Thank you  Bye  Appointment rescheduled

## 2023-05-08 DIAGNOSIS — J30.9 ALLERGIC RHINITIS, UNSPECIFIED SEASONALITY, UNSPECIFIED TRIGGER: ICD-10-CM

## 2023-05-08 RX ORDER — FLUTICASONE PROPIONATE 50 MCG
SPRAY, SUSPENSION (ML) NASAL
Qty: 16 G | Refills: 1 | Status: SHIPPED | OUTPATIENT
Start: 2023-05-08

## 2023-05-25 DIAGNOSIS — J30.9 ALLERGIC RHINITIS, UNSPECIFIED SEASONALITY, UNSPECIFIED TRIGGER: ICD-10-CM

## 2023-05-26 RX ORDER — LORATADINE 10 MG/1
10 TABLET ORAL DAILY
Qty: 90 TABLET | Refills: 1 | Status: SHIPPED | OUTPATIENT
Start: 2023-05-26

## 2023-06-04 DIAGNOSIS — I10 ESSENTIAL HYPERTENSION: ICD-10-CM

## 2023-06-05 RX ORDER — AMLODIPINE BESYLATE 10 MG/1
TABLET ORAL
Qty: 90 TABLET | Refills: 1 | Status: SHIPPED | OUTPATIENT
Start: 2023-06-05

## 2023-06-19 ENCOUNTER — OFFICE VISIT (OUTPATIENT)
Dept: FAMILY MEDICINE CLINIC | Facility: CLINIC | Age: 43
End: 2023-06-19

## 2023-06-19 VITALS
HEIGHT: 61 IN | TEMPERATURE: 97.5 F | RESPIRATION RATE: 18 BRPM | HEART RATE: 84 BPM | WEIGHT: 250 LBS | SYSTOLIC BLOOD PRESSURE: 138 MMHG | BODY MASS INDEX: 47.2 KG/M2 | OXYGEN SATURATION: 97 % | DIASTOLIC BLOOD PRESSURE: 84 MMHG

## 2023-06-19 DIAGNOSIS — E78.2 MIXED HYPERLIPIDEMIA: ICD-10-CM

## 2023-06-19 DIAGNOSIS — I10 ESSENTIAL HYPERTENSION: Primary | ICD-10-CM

## 2023-06-19 DIAGNOSIS — E66.01 CLASS 3 SEVERE OBESITY DUE TO EXCESS CALORIES WITH SERIOUS COMORBIDITY AND BODY MASS INDEX (BMI) OF 45.0 TO 49.9 IN ADULT (HCC): ICD-10-CM

## 2023-06-19 DIAGNOSIS — R80.9 PROTEINURIA, UNSPECIFIED TYPE: ICD-10-CM

## 2023-06-19 DIAGNOSIS — R31.9 HEMATURIA, UNSPECIFIED TYPE: ICD-10-CM

## 2023-06-19 PROCEDURE — 99214 OFFICE O/P EST MOD 30 MIN: CPT

## 2023-06-19 RX ORDER — LOSARTAN POTASSIUM 50 MG/1
50 TABLET ORAL DAILY
Qty: 90 TABLET | Refills: 1 | Status: SHIPPED | OUTPATIENT
Start: 2023-06-19

## 2023-06-19 RX ORDER — LOSARTAN POTASSIUM 25 MG/1
25 TABLET ORAL DAILY
Qty: 30 TABLET | Refills: 2 | Status: SHIPPED | OUTPATIENT
Start: 2023-06-19

## 2023-06-19 NOTE — ASSESSMENT & PLAN NOTE
BP remains borderline in office today: 138/84  Home readings still sometimes over 140/90    - Continue amlodipine 10 mg    - Increase losartan to 75 mg daily  - Continue low-salt diet and daily physical activity for weight loss  - Continue home BP monitoring, report readings to office if still consistently over 140/90

## 2023-06-19 NOTE — PROGRESS NOTES
Name: Yanely Peña      : 1980      MRN: [id number]  Encounter Provider: KATARINA Rowley  Encounter Date: 2023   Encounter department: [address] [place] [address]  Essential hypertension  Assessment & Plan:  BP remains borderline in office today: 138/84  Home readings still sometimes over 140/90    - Continue amlodipine 10 mg    - Increase losartan to 75 mg daily  - Continue low-salt diet and daily physical activity for weight loss  - Continue home BP monitoring, report readings to office if still consistently over 140/90  Orders:  -     losartan (COZAAR) 50 mg tablet; Take 1 tablet (50 mg total) by mouth daily  -     losartan (COZAAR) 25 mg tablet; Take 1 tablet (25 mg total) by mouth daily  -     CBC and differential; Future  -     Comprehensive metabolic panel; Future    2  Class 3 severe obesity due to excess calories with serious comorbidity and body mass index (BMI) of 45 0 to 49 9 in adult [facility])  Assessment & Plan:  Wt Readings from Last 3 Encounters:   23 113 kg (250 lb)   23 117 kg (258 lb)   23 124 kg (273 lb 6 4 oz)     Down 23 lbs since January  Pt much more physically active in current job  Has started eating light breakfast     BMI Counseling: Body mass index is 47 24 kg/m²  The BMI is above normal    - Nutrition recommendations include decreasing portion sizes, encouraging healthy choices of fruits and vegetables, limiting drinks that contain sugar and moderation in carbohydrate intake  - Exercise recommendations include moderate physical activity 150 minutes/week        3  Mixed hyperlipidemia  Assessment & Plan:  Lab Results   Component Value Date    CHOLESTEROL 239 (H) 2023    CHOLESTEROL 224 (H) 10/31/2022     Lab Results   Component Value Date    HDL 42 2023    HDL 43 10/31/2022     Lab Results   Component Value Date    TRIG 176 (H) 2023    TRIG 144 10/31/2022     Lab Results   Component Value Date    [place] 197 04/13/2023    [place] 181 10/31/2022     Lab Results   Component Value Date    LDLCALC 162 (H) 04/13/2023    LDLCALC 152 (H) 10/31/2022     The 10-year ASCVD risk score (Graciela SULLIVAN, et al , 2019) is: 2 7%  - Continue lifestyle changes to promote continued weight loss  - Repeat lipids in 6 months  Orders:  -     Lipid panel; Future    4  Proteinuria, unspecified type  Assessment & Plan:  - Repeat microalbumin in 6 months  Orders:  -     Albumin / creatinine urine ratio; Future    5  Hematuria, unspecified type  -     UA w Reflex to Microscopic w Reflex to Culture -Lab Collect; Future; Expected date: 06/19/2023    BMI Counseling: Body mass index is 47 24 kg/m²  The BMI is above normal  Nutrition recommendations include decreasing portion sizes, encouraging healthy choices of fruits and vegetables, limiting drinks that contain sugar and moderation in carbohydrate intake  Exercise recommendations include moderate physical activity 150 minutes/week  Rationale for BMI follow-up plan is due to patient being overweight or obese  Subjective     HPI     Pieter presents to the office for blood pressure follow up since increasing losartan in April  Tolerating medication increase with no side effects  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  Reports doing well in new job and is much more physically active than before  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,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  Review of Systems   Constitutional: Negative for activity change, appetite change, fatigue, fever and unexpected weight change  Eyes: Negative for visual disturbance  Respiratory: Negative for cough, chest tightness, shortness of breath and wheezing  Cardiovascular: Negative for chest pain, palpitations and leg swelling  Genitourinary: Negative for difficulty urinating, dysuria and hematuria  Musculoskeletal: Negative for arthralgias and joint swelling     Allergic/Immunologic: Positive for environmental allergies  Neurological: Negative for dizziness, syncope, light-headedness and headaches  Psychiatric/Behavioral: Negative for sleep disturbance  All other systems reviewed and are negative        Past Medical History:   Diagnosis Date   • Ankle swelling    • Arthritis    • Asthma    • Dizziness    • Heartburn    • Hypertension    • Increased frequency of urination    • Intolerance to heat    • Joint pain     and joint swelling   • Morbid obesity with BMI of 50 0-59 9, adult ([facility] [address] ) 12/16/2019   • Sleep apnea    • SOB (shortness of breath)      Past Surgical History:   Procedure Laterality Date   • IR ASPIRATION JOINT (SPECIFY LOCATION)  2/16/2022     Family History   Problem Relation Age of Onset   • Hypertension Mother    • Throat cancer Father    • No Known Problems Sister    • No Known Problems Sister    • Hypertension Sister    • Diabetes Neg Hx    • Heart disease Neg Hx    • Stroke Neg Hx    • Thyroid disease Neg Hx      Social History     Socioeconomic History   • Marital status: /Civil Union     Spouse name: None   • Number of children: None   • Years of education: None   • Highest education level: None   Occupational History   • None   Tobacco Use   • Smoking status: Former     Passive exposure: Past   • Smokeless tobacco: Never   Vaping Use   • Vaping Use: Never used   Substance and Sexual Activity   • Alcohol use: Never   • Drug use: No   • Sexual activity: None   Other Topics Concern   • None   Social History Narrative   • None     Social Determinants of Health     Financial Resource Strain: Low Risk  (10/30/2022)    Overall Financial Resource Strain (CARDIA)    • Difficulty of Paying Living Expenses: Not hard at all   Food Insecurity: No Food Insecurity (10/30/2022)    Hunger Vital Sign    • Worried About Running Out of Food in the Last Year: Never true    • Ran Out of Food in the Last Year: Never true   Transportation Needs: No Transportation Needs (10/30/2022)    PRAPARE - "Transportation    • Lack of Transportation (Medical): No    • Lack of Transportation (Non-Medical): No   Physical Activity: Not on file   Stress: Not on file   Social Connections: Not on file   Intimate Partner Violence: Not on file   Housing Stability: Low Risk  (2/18/2022)    Housing Stability Vital Sign    • Unable to Pay for Housing in the Last Year: No    • Number of Places Lived in the Last Year: 1    • Unstable Housing in the Last Year: No     Current Outpatient Medications on File Prior to Visit   Medication Sig   • loratadine (CLARITIN) 10 mg tablet Take 1 tablet (10 mg total) by mouth daily   • albuterol (PROVENTIL HFA,VENTOLIN HFA) 90 mcg/act inhaler Inhale 2 puffs every 6 (six) hours as needed for wheezing   • allopurinol (ZYLOPRIM) 300 mg tablet Take 1 tablet (300 mg total) by mouth daily   • amLODIPine (NORVASC) 10 mg tablet TAKE 1 TABLET(10 MG) BY MOUTH DAILY   • colchicine (COLCRYS) 0 6 mg tablet Take 1 tablet (0 6 mg total) by mouth daily   • ergocalciferol (VITAMIN D2) 50,000 units TAKE 1 CAPSULE BY MOUTH 1 TIME A WEEK FOR 12 DOSES   • fluticasone (FLONASE) 50 mcg/act nasal spray SHAKE LIQUID AND USE 2 SPRAYS IN EACH NOSTRIL DAILY   • montelukast (SINGULAIR) 10 mg tablet TAKE 1 TABLET(10 MG) BY MOUTH DAILY AT BEDTIME   • [DISCONTINUED] losartan (COZAAR) 50 mg tablet Take 1 tablet (50 mg total) by mouth daily     Allergies   Allergen Reactions   • Other Hives and Shortness Of Breath     Fresh Fruit   • Seasonal Ic [Cholestatin] Itching, Sneezing and Nasal Congestion     Immunization History   Administered Date(s) Administered   • Tdap 08/20/2016       Objective     /84 (BP Location: Left arm, Patient Position: Sitting, Cuff Size: Large)   Pulse 84   Temp 97 5 °F (36 4 °C) (Temporal)   Resp 18   Ht 5' 1\" (1 549 m)   Wt 113 kg (250 lb)   SpO2 97%   BMI 47 24 kg/m²     Physical Exam  Vitals reviewed  Constitutional:       General: He is not in acute distress       Appearance: He is " morbidly obese  He is not ill-appearing or diaphoretic  HENT:      Head: Normocephalic and atraumatic  Cardiovascular:      Rate and Rhythm: Normal rate and regular rhythm  Heart sounds: Normal heart sounds  No murmur heard  Pulmonary:      Effort: Pulmonary effort is normal  No tachypnea  Breath sounds: Normal breath sounds  No decreased breath sounds or wheezing  Musculoskeletal:      Right lower leg: No edema  Left lower leg: No edema  Skin:     General: Skin is warm and dry  Neurological:      Mental Status: He is alert and oriented to person, place, and time  Psychiatric:         Attention and Perception: Attention normal          Mood and Affect: Mood and affect normal          Speech: Speech normal          Behavior: Behavior normal          Thought Content:  Thought content normal        KATARINA Dick

## 2023-06-19 NOTE — ASSESSMENT & PLAN NOTE
Wt Readings from Last 3 Encounters:   06/19/23 113 kg (250 lb)   04/13/23 117 kg (258 lb)   01/17/23 124 kg (273 lb 6 4 oz)     Down 23 lbs since January  Pt much more physically active in current job  Has started eating light breakfast     BMI Counseling: Body mass index is 47 24 kg/m²  The BMI is above normal    - Nutrition recommendations include decreasing portion sizes, encouraging healthy choices of fruits and vegetables, limiting drinks that contain sugar and moderation in carbohydrate intake  - Exercise recommendations include moderate physical activity 150 minutes/week

## 2023-06-19 NOTE — ASSESSMENT & PLAN NOTE
Lab Results   Component Value Date    CHOLESTEROL 239 (H) 04/13/2023    CHOLESTEROL 224 (H) 10/31/2022     Lab Results   Component Value Date    HDL 42 04/13/2023    HDL 43 10/31/2022     Lab Results   Component Value Date    TRIG 176 (H) 04/13/2023    TRIG 144 10/31/2022     Lab Results   Component Value Date    [place] 197 04/13/2023    [place] 181 10/31/2022     Lab Results   Component Value Date    LDLCALC 162 (H) 04/13/2023    LDLCALC 152 (H) 10/31/2022     The 10-year ASCVD risk score (Graciela SULLIVAN, et al , 2019) is: 2 7%  - Continue lifestyle changes to promote continued weight loss  - Repeat lipids in 6 months

## 2023-07-08 DIAGNOSIS — J30.9 ALLERGIC RHINITIS, UNSPECIFIED SEASONALITY, UNSPECIFIED TRIGGER: ICD-10-CM

## 2023-07-08 RX ORDER — FLUTICASONE PROPIONATE 50 MCG
SPRAY, SUSPENSION (ML) NASAL
Qty: 16 G | Refills: 1 | Status: SHIPPED | OUTPATIENT
Start: 2023-07-08 | End: 2023-07-11

## 2023-07-10 DIAGNOSIS — J30.9 ALLERGIC RHINITIS, UNSPECIFIED SEASONALITY, UNSPECIFIED TRIGGER: ICD-10-CM

## 2023-07-11 RX ORDER — FLUTICASONE PROPIONATE 50 MCG
SPRAY, SUSPENSION (ML) NASAL
Qty: 48 G | Refills: 1 | Status: SHIPPED | OUTPATIENT
Start: 2023-07-11

## 2023-08-15 DIAGNOSIS — I10 ESSENTIAL HYPERTENSION: ICD-10-CM

## 2023-08-18 RX ORDER — LOSARTAN POTASSIUM 25 MG/1
25 TABLET ORAL DAILY
Qty: 90 TABLET | Refills: 1 | Status: SHIPPED | OUTPATIENT
Start: 2023-08-18

## 2023-10-08 DIAGNOSIS — M1A.9XX0 CHRONIC GOUT WITHOUT TOPHUS, UNSPECIFIED CAUSE, UNSPECIFIED SITE: ICD-10-CM

## 2023-10-09 RX ORDER — ALLOPURINOL 300 MG/1
300 TABLET ORAL DAILY
Qty: 90 TABLET | Refills: 1 | Status: SHIPPED | OUTPATIENT
Start: 2023-10-09

## 2023-10-12 DIAGNOSIS — J30.9 ALLERGIC RHINITIS, UNSPECIFIED SEASONALITY, UNSPECIFIED TRIGGER: ICD-10-CM

## 2023-10-15 RX ORDER — MONTELUKAST SODIUM 10 MG/1
TABLET ORAL
Qty: 90 TABLET | Refills: 1 | Status: SHIPPED | OUTPATIENT
Start: 2023-10-15

## 2023-10-24 ENCOUNTER — TELEPHONE (OUTPATIENT)
Dept: PAIN MEDICINE | Facility: CLINIC | Age: 43
End: 2023-10-24

## 2023-10-31 DIAGNOSIS — I10 ESSENTIAL HYPERTENSION: ICD-10-CM

## 2023-11-01 RX ORDER — LOSARTAN POTASSIUM 25 MG/1
25 TABLET ORAL DAILY
Qty: 90 TABLET | Refills: 1 | Status: SHIPPED | OUTPATIENT
Start: 2023-11-01

## 2023-11-01 NOTE — PROGRESS NOTES
Rheumatology Follow-up Visit  11/1/2023     CC: routine follow up     Permanent History: Gout on allopurinol and colchicine. Assessment: 41 y.o. male here for/with the above. No gout flares but last check was still above goal (8)    Beginnings of what sounds like an inflammatory arthritis, but was temporally associated to increased use of hands for heavy moving so not clear    Plan:  -Increase allopurinol to 400 daily, continue colchicine  -Gout labs in 3 weeks  -Hand and foot roentgenograms, RF, ACPA  -RTC 3 mos or sooner PRN    Jose J Sanchez DO, CCD    Jose J Sanchez DO, CCD  [facility] Rhe[facility]      Interval History: Hasn't had any flares since April, just gets worsening pain if on his feet for too long    Recently, fingers feel very stiff. Has been going on for about a month and a half or so. Worst when he rests for too long. Hard to tell how long it takes to get better. Maybe happens in elbows too if driving for too long. No preceding illness that he can remember. For his job at that time, he was doing a lot of heavy pulling of things; about two weeks later, that's when he noticed stiffness. Chrystal Jacome out of his truck 4 weeks ago, still having R thumb pain from that. No rashes, fevers, uveitis, dactylitis, dysphagia/odynophagia    Past medical history, allergies, and family history reviewed. Active medications and social history as below. No outpatient medications have been marked as taking for the 11/3/23 encounter (Appointment) with Daly Corbett DO. Social History     Tobacco Use    Smoking status: Former     Passive exposure: Past    Smokeless tobacco: Never   Vaping Use    Vaping Use: Never used   Substance Use Topics    Alcohol use: Never    Drug use: No       Review of Systems:  Pertinent findings documented in HPI  ___________________________________    Physical Exam:    There were no vitals taken for this visit. General: Well appearing, in no distress.    Eyes: Sclera non-icteric. EOMI  HENT: No oral ulcers. MMM. Heart: Regular rate and rhythm, no obvious murmurs. Lungs: Lungs clear bilaterally without obvious wheezes or crackles. Extremities: Warm, well perfused, no edema. Neuro: Alert and oriented. No gross focal neurological deficits. Skin: No rashes. MSK exam: mild tenderness to palpation R 5th MCP and 1st IP wo synovitis.  No other joint tenderness to palpation or synovitis  ____________________________    Lab Results: relevant labs reviewed    Imaging Results: relevant images reviewed

## 2023-11-03 ENCOUNTER — HOSPITAL ENCOUNTER (OUTPATIENT)
Dept: RADIOLOGY | Facility: HOSPITAL | Age: 43
Discharge: HOME | End: 2023-11-03
Payer: MEDICARE

## 2023-11-03 ENCOUNTER — APPOINTMENT (OUTPATIENT)
Dept: LAB | Facility: CLINIC | Age: 43
End: 2023-11-03
Payer: MEDICARE

## 2023-11-03 ENCOUNTER — OFFICE VISIT (OUTPATIENT)
Dept: RHEUMATOLOGY | Facility: CLINIC | Age: 43
End: 2023-11-03
Payer: MEDICARE

## 2023-11-03 VITALS
BODY MASS INDEX: 46.12 KG/M2 | WEIGHT: 244.3 LBS | HEIGHT: 61 IN | SYSTOLIC BLOOD PRESSURE: 128 MMHG | DIASTOLIC BLOOD PRESSURE: 80 MMHG

## 2023-11-03 DIAGNOSIS — R80.9 PROTEINURIA, UNSPECIFIED TYPE: ICD-10-CM

## 2023-11-03 DIAGNOSIS — M19.90 INFLAMMATORY ARTHRITIS: ICD-10-CM

## 2023-11-03 DIAGNOSIS — M1A.9XX0 CHRONIC GOUT WITHOUT TOPHUS, UNSPECIFIED CAUSE, UNSPECIFIED SITE: ICD-10-CM

## 2023-11-03 DIAGNOSIS — M1A.9XX0 CHRONIC GOUT WITHOUT TOPHUS, UNSPECIFIED CAUSE, UNSPECIFIED SITE: Primary | ICD-10-CM

## 2023-11-03 DIAGNOSIS — R31.9 HEMATURIA, UNSPECIFIED TYPE: ICD-10-CM

## 2023-11-03 DIAGNOSIS — I10 ESSENTIAL HYPERTENSION: ICD-10-CM

## 2023-11-03 DIAGNOSIS — R65.10 SIRS (SYSTEMIC INFLAMMATORY RESPONSE SYNDROME) (HCC): ICD-10-CM

## 2023-11-03 DIAGNOSIS — E78.2 MIXED HYPERLIPIDEMIA: ICD-10-CM

## 2023-11-03 LAB
ALBUMIN SERPL BCP-MCNC: 4.7 G/DL (ref 3.5–5)
ALP SERPL-CCNC: 72 U/L (ref 34–104)
ALT SERPL W P-5'-P-CCNC: 26 U/L (ref 7–52)
ANION GAP SERPL CALCULATED.3IONS-SCNC: 12 MMOL/L
AST SERPL W P-5'-P-CCNC: 22 U/L (ref 13–39)
BASOPHILS # BLD AUTO: 0.08 THOUSANDS/ÂΜL (ref 0–0.1)
BASOPHILS NFR BLD AUTO: 1 % (ref 0–1)
BILIRUB DIRECT SERPL-MCNC: 0.1 MG/DL (ref 0–0.2)
BILIRUB SERPL-MCNC: 0.55 MG/DL (ref 0.2–1)
BILIRUB UR QL STRIP: NEGATIVE
BUN SERPL-MCNC: 30 MG/DL (ref 5–25)
CALCIUM SERPL-MCNC: 9.8 MG/DL (ref 8.4–10.2)
CHLORIDE SERPL-SCNC: 105 MMOL/L (ref 96–108)
CHOLEST SERPL-MCNC: 228 MG/DL
CLARITY UR: CLEAR
CO2 SERPL-SCNC: 23 MMOL/L (ref 21–32)
COLOR UR: NORMAL
CREAT SERPL-MCNC: 1.33 MG/DL (ref 0.6–1.3)
CREAT UR-MCNC: 113.3 MG/DL
CRP SERPL QL: 6.3 MG/L
EOSINOPHIL # BLD AUTO: 0.45 THOUSAND/ÂΜL (ref 0–0.61)
EOSINOPHIL NFR BLD AUTO: 3 % (ref 0–6)
ERYTHROCYTE [DISTWIDTH] IN BLOOD BY AUTOMATED COUNT: 14 % (ref 11.6–15.1)
ERYTHROCYTE [SEDIMENTATION RATE] IN BLOOD: 21 MM/HOUR (ref 0–14)
GFR SERPL CREATININE-BSD FRML MDRD: 65 ML/MIN/1.73SQ M
GLUCOSE P FAST SERPL-MCNC: 86 MG/DL (ref 65–99)
GLUCOSE UR STRIP-MCNC: NEGATIVE MG/DL
HCT VFR BLD AUTO: 49.8 % (ref 36.5–49.3)
HDLC SERPL-MCNC: 53 MG/DL
HGB BLD-MCNC: 16.5 G/DL (ref 12–17)
HGB UR QL STRIP.AUTO: NEGATIVE
IMM GRANULOCYTES # BLD AUTO: 0.07 THOUSAND/UL (ref 0–0.2)
IMM GRANULOCYTES NFR BLD AUTO: 1 % (ref 0–2)
KETONES UR STRIP-MCNC: NEGATIVE MG/DL
LDLC SERPL CALC-MCNC: 156 MG/DL (ref 0–100)
LEUKOCYTE ESTERASE UR QL STRIP: NEGATIVE
LYMPHOCYTES # BLD AUTO: 3.13 THOUSANDS/ÂΜL (ref 0.6–4.47)
LYMPHOCYTES NFR BLD AUTO: 23 % (ref 14–44)
MCH RBC QN AUTO: 29.4 PG (ref 26.8–34.3)
MCHC RBC AUTO-ENTMCNC: 33.1 G/DL (ref 31.4–37.4)
MCV RBC AUTO: 89 FL (ref 82–98)
MICROALBUMIN UR-MCNC: 67.3 MG/L
MICROALBUMIN/CREAT 24H UR: 59 MG/G CREATININE (ref 0–30)
MONOCYTES # BLD AUTO: 1.08 THOUSAND/ÂΜL (ref 0.17–1.22)
MONOCYTES NFR BLD AUTO: 8 % (ref 4–12)
NEUTROPHILS # BLD AUTO: 8.79 THOUSANDS/ÂΜL (ref 1.85–7.62)
NEUTS SEG NFR BLD AUTO: 64 % (ref 43–75)
NITRITE UR QL STRIP: NEGATIVE
NONHDLC SERPL-MCNC: 175 MG/DL
NRBC BLD AUTO-RTO: 0 /100 WBCS
PH UR STRIP.AUTO: 5 [PH]
PLATELET # BLD AUTO: 341 THOUSANDS/UL (ref 149–390)
PMV BLD AUTO: 10.2 FL (ref 8.9–12.7)
POTASSIUM SERPL-SCNC: 4 MMOL/L (ref 3.5–5.3)
PROT SERPL-MCNC: 8 G/DL (ref 6.4–8.4)
PROT UR STRIP-MCNC: NEGATIVE MG/DL
RBC # BLD AUTO: 5.61 MILLION/UL (ref 3.88–5.62)
SODIUM SERPL-SCNC: 140 MMOL/L (ref 135–147)
SP GR UR STRIP.AUTO: 1.02 (ref 1–1.03)
TRIGL SERPL-MCNC: 93 MG/DL
URATE SERPL-MCNC: 7.6 MG/DL (ref 3.5–8.5)
UROBILINOGEN UR STRIP-ACNC: <2 MG/DL
WBC # BLD AUTO: 13.6 THOUSAND/UL (ref 4.31–10.16)

## 2023-11-03 PROCEDURE — 85025 COMPLETE CBC W/AUTO DIFF WBC: CPT

## 2023-11-03 PROCEDURE — 86140 C-REACTIVE PROTEIN: CPT

## 2023-11-03 PROCEDURE — 82043 UR ALBUMIN QUANTITATIVE: CPT

## 2023-11-03 PROCEDURE — 73130 X-RAY EXAM OF HAND: CPT

## 2023-11-03 PROCEDURE — 36415 COLL VENOUS BLD VENIPUNCTURE: CPT

## 2023-11-03 PROCEDURE — 81003 URINALYSIS AUTO W/O SCOPE: CPT

## 2023-11-03 PROCEDURE — 73630 X-RAY EXAM OF FOOT: CPT

## 2023-11-03 PROCEDURE — 99244 OFF/OP CNSLTJ NEW/EST MOD 40: CPT | Performed by: STUDENT IN AN ORGANIZED HEALTH CARE EDUCATION/TRAINING PROGRAM

## 2023-11-03 PROCEDURE — 82570 ASSAY OF URINE CREATININE: CPT

## 2023-11-03 PROCEDURE — 80053 COMPREHEN METABOLIC PANEL: CPT

## 2023-11-03 PROCEDURE — 86430 RHEUMATOID FACTOR TEST QUAL: CPT

## 2023-11-03 PROCEDURE — 82248 BILIRUBIN DIRECT: CPT | Performed by: STUDENT IN AN ORGANIZED HEALTH CARE EDUCATION/TRAINING PROGRAM

## 2023-11-03 PROCEDURE — 80061 LIPID PANEL: CPT

## 2023-11-03 PROCEDURE — 85652 RBC SED RATE AUTOMATED: CPT

## 2023-11-03 PROCEDURE — 86200 CCP ANTIBODY: CPT

## 2023-11-03 PROCEDURE — 84550 ASSAY OF BLOOD/URIC ACID: CPT | Performed by: STUDENT IN AN ORGANIZED HEALTH CARE EDUCATION/TRAINING PROGRAM

## 2023-11-03 RX ORDER — ALLOPURINOL 300 MG/1
300 TABLET ORAL DAILY
Qty: 90 TABLET | Refills: 1 | Status: SHIPPED | OUTPATIENT
Start: 2023-11-03

## 2023-11-03 RX ORDER — ALLOPURINOL 100 MG/1
TABLET ORAL
Qty: 90 TABLET | Refills: 2 | Status: SHIPPED | OUTPATIENT
Start: 2023-11-03

## 2023-11-03 RX ORDER — COLCHICINE 0.6 MG/1
0.6 TABLET ORAL DAILY
Qty: 90 TABLET | Refills: 3 | Status: SHIPPED | OUTPATIENT
Start: 2023-11-03

## 2023-11-03 NOTE — PATIENT INSTRUCTIONS
Please increase your allopurinol to 400 mg (one 100 mg pill and one 300 mg pill) a day    Continue colchicine    Please get blood work (CBC, CMP, RF, anti-CCP) today    In three weeks, please get blood work (Hepatic function panel, creatinine, serum uric acid).  If these numbers look good I will let you know how to adjust your allopurinol

## 2023-11-04 LAB — RHEUMATOID FACT SER QL LA: NEGATIVE

## 2023-11-08 LAB — CCP AB SER IA-ACNC: 1.8

## 2023-11-10 ENCOUNTER — TELEPHONE (OUTPATIENT)
Dept: RHEUMATOLOGY | Facility: CLINIC | Age: 43
End: 2023-11-10

## 2023-11-10 NOTE — RESULT ENCOUNTER NOTE
No signs of RA, his hand symptoms likely more related to the activity he was doing at work    Please let patient know - needs labs (creatinine, LFTs, serum uric acid) again 3 weeks after increasing the dose of the allopurinol

## 2023-11-10 NOTE — TELEPHONE ENCOUNTER
----- Message from Nima Nance DO sent at 11/10/2023 12:20 PM EST -----  No signs of RA, his hand symptoms likely more related to the activity he was doing at work    Please let patient know - needs labs (creatinine, LFTs, serum uric acid) again 3 weeks after increasing the dose of the allopurinol

## 2023-11-22 DIAGNOSIS — I10 ESSENTIAL HYPERTENSION: ICD-10-CM

## 2023-11-23 RX ORDER — AMLODIPINE BESYLATE 10 MG/1
TABLET ORAL
Qty: 90 TABLET | Refills: 1 | Status: SHIPPED | OUTPATIENT
Start: 2023-11-23

## 2023-12-07 ENCOUNTER — RA CDI HCC (OUTPATIENT)
Dept: OTHER | Facility: HOSPITAL | Age: 43
End: 2023-12-07

## 2023-12-07 NOTE — PROGRESS NOTES
[address] [facility] coding opportunities       Chart reviewed, no opportunity found: CHART REVIEWED, NO OPPORTUNITY FOUND        Patients Insurance        Commercial Insurance: [employer]

## 2023-12-18 ENCOUNTER — TELEPHONE (OUTPATIENT)
Dept: FAMILY MEDICINE CLINIC | Facility: CLINIC | Age: 43
End: 2023-12-18

## 2024-01-05 DIAGNOSIS — I10 ESSENTIAL HYPERTENSION: ICD-10-CM

## 2024-01-05 RX ORDER — LOSARTAN POTASSIUM 50 MG/1
50 TABLET ORAL DAILY
Qty: 90 TABLET | Refills: 1 | Status: SHIPPED | OUTPATIENT
Start: 2024-01-05

## 2024-01-31 ENCOUNTER — APPOINTMENT (OUTPATIENT)
Dept: LAB | Facility: CLINIC | Age: 44
End: 2024-01-31
Payer: MEDICARE

## 2024-02-05 ENCOUNTER — OFFICE VISIT (OUTPATIENT)
Dept: SLEEP CENTER | Facility: CLINIC | Age: 44
End: 2024-02-05
Payer: MEDICARE

## 2024-02-05 VITALS
HEIGHT: 61 IN | WEIGHT: 247 LBS | DIASTOLIC BLOOD PRESSURE: 76 MMHG | SYSTOLIC BLOOD PRESSURE: 116 MMHG | BODY MASS INDEX: 46.63 KG/M2

## 2024-02-05 DIAGNOSIS — G47.33 OSA (OBSTRUCTIVE SLEEP APNEA): Primary | ICD-10-CM

## 2024-02-05 DIAGNOSIS — J45.20 MILD INTERMITTENT ASTHMA WITHOUT COMPLICATION: ICD-10-CM

## 2024-02-05 DIAGNOSIS — E66.01 CLASS 3 SEVERE OBESITY DUE TO EXCESS CALORIES WITH SERIOUS COMORBIDITY AND BODY MASS INDEX (BMI) OF 45.0 TO 49.9 IN ADULT (HCC): ICD-10-CM

## 2024-02-05 PROCEDURE — 99214 OFFICE O/P EST MOD 30 MIN: CPT | Performed by: INTERNAL MEDICINE

## 2024-02-05 NOTE — PROGRESS NOTES
Pulmonary/Sleep Follow Up Note   Pieter Gary 43 y.o. male MRN: [id number]  2/5/2024      Assessment and Plan:    1. 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 Mild intermittent asthma without complication  Very mild on albuterol rescue inhaler and Singulair    3. Class 3 severe obesity due to excess calories with serious comorbidity and body mass index (BMI) of 45.0 to 49.9 in adult (HCC)  BMI of 46.67 he would benefit from weight loss        Return in about 1 year (around 2/5/2025).    History of Present Illness   HPI:  Pieter Gary is a 43 y.o. male who is here for his annual visit he was seen by Dr. De Los Santos in the past diagnosed with very severe EVE, compliant using BiPAP is a fixed pressure 25/20, averaging 9 hours of sleep during the day as he works night shift as a .  His [place] scale is minimal as detailed below.    Sitting and reading: Would never doze  Watching TV: Slight chance of dozing  Sitting, inactive in a public place (e.g. a theatre or a meeting): Would never doze  As a passenger in a car for an hour without a break: Slight chance of dozing  Lying down to rest in the afternoon when circumstances permit: Moderate chance of dozing  Sitting and talking to someone: Would never doze  Sitting quietly after a lunch without alcohol: Would never doze  In a car, while stopped for a few minutes in traffic: Would never doze  Total score: 4          Historical Information   Past Medical History:   Diagnosis Date    Ankle swelling     Arthritis     Asthma     Dizziness     Heartburn     Hypertension     Increased frequency of urination     Intolerance to heat     Joint pain     and joint swelling     Morbid obesity with BMI of 50.0-59.9, adult ([facility]) 12/16/2019    Sleep apnea     SOB (shortness of breath)      Past Surgical History:   Procedure Laterality Date    IR ASPIRATION JOINT (SPECIFY LOCATION)  2/16/2022     Family History   Problem Relation Age of Onset    Hypertension Mother     Throat cancer Father     No Known Problems Sister     No Known Problems Sister     Hypertension Sister     Diabetes Neg Hx     Heart disease Neg Hx     Stroke Neg Hx     Thyroid disease Neg Hx          Meds/Allergies     Current Outpatient Medications:     albuterol (PROVENTIL HFA,VENTOLIN HFA) 90 mcg/act inhaler, Inhale 2 puffs every 6 (six) hours as needed for wheezing, Disp: 8.5 g, Rfl: 0    allopurinol (ZYLOPRIM) 100 mg tablet, Take 1 pill daily in combo with your 300 mg pill for a total of 400 mg daily, Disp: 90 tablet, Rfl: 2    allopurinol (ZYLOPRIM) 300 mg tablet, Take 1 tablet (300 mg total) by mouth daily, Disp: 90 tablet, Rfl: 1    amLODIPine (NORVASC) 10 mg tablet, TAKE 1 TABLET(10 MG) BY MOUTH DAILY, Disp: 90 tablet, Rfl: 1    colchicine (COLCRYS) 0.6 mg tablet, Take 1 tablet (0.6 mg total) by mouth daily, Disp: 90 tablet, Rfl: 3    fluticasone (FLONASE) 50 mcg/act nasal spray, SHAKE LIQUID AND USE 2 SPRAYS IN EACH NOSTRIL DAILY, Disp: 48 g, Rfl: 1    loratadine (CLARITIN) 10 mg tablet, Take 1 tablet (10 mg total) by mouth daily, Disp: 90 tablet, Rfl: 1    losartan (COZAAR) 25 mg tablet, Take 1 tablet (25 mg total) by mouth daily, Disp: 90 tablet, Rfl: 1    losartan (COZAAR) 50 mg tablet, TAKE 1 TABLET(50 MG) BY MOUTH DAILY, Disp: 90 tablet, Rfl: 1    montelukast (SINGULAIR) 10 mg tablet, TAKE 1 TABLET(10 MG) BY MOUTH DAILY AT BEDTIME, Disp: 90 tablet, Rfl: 1    ergocalciferol (VITAMIN D2) 50,000 units, TAKE 1 CAPSULE BY MOUTH 1 TIME A WEEK FOR 12 DOSES (Patient not taking: Reported on 11/3/2023), Disp: 4 capsule, Rfl: 2  Allergies   Allergen Reactions    Other Hives and Shortness Of Breath     Fresh Fruit     "Seasonal Ic [Cholestatin] Itching, Sneezing and Nasal Congestion       Vitals: Blood pressure 116/76, height 5' 1\" (1.549 m), weight 112 kg (247 lb). Body mass index is 46.67 kg/m².        Physical Exam  General:  Awake alert and oriented x 3, conversant without conversational dyspnea, NAD, normal affect  HEENT:   Sclera noninjected, nonicteric OU, Nares patent,  no craniofacial abnormalities  NECK:  Trachea midline, no accessory muscle use, no stridor,  JVP not elevated  CARDIAC: Reg, single s1/S2, no m/r/g  PULM: CTA bilaterally no wheezing, rhonchi or rales  EXT: No cyanosis, no clubbing, no edema  NEURO: no focal neurologic deficits, AAOx3, moving all extremities appropriately    Labs: I have personally reviewed pertinent lab results., ABG: No results found for: \"PHART\", \"KXF7FXM\", \"PO2ART\", \"LXV2LQL\", \"V6HNFCLR\", \"BEART\", \"SOURCE\", BNP: No results found for: \"BNP\", CBC: No results found for: \"WBC\", \"HGB\", \"HCT\", \"MCV\", \"PLT\", \"ADJUSTEDWBC\", \"RBC\", \"MCH\", \"MCHC\", \"RDW\", \"MPV\", \"NRBC\", CMP: No results found for: \"SODIUM\", \"K\", \"CL\", \"CO2\", \"ANIONGAP\", \"BUN\", \"CREATININE\", \"GLUCOSE\", \"CALCIUM\", \"AST\", \"ALT\", \"ALKPHOS\", \"PROT\", \"BILITOT\", \"EGFR\", PT/INR: No results found for: \"PT\", \"INR\", Troponin: No results found for: \"TROPONINI\"  Lab Results   Component Value Date    WBC 13.60 (H) 11/03/2023    HGB 16.5 11/03/2023    HCT 49.8 (H) 11/03/2023    MCV 89 11/03/2023     11/03/2023     Lab Results   Component Value Date    CALCIUM 9.8 11/03/2023    K 4.0 11/03/2023    CO2 23 11/03/2023     11/03/2023    BUN 30 (H) 11/03/2023    CREATININE 1.39 (H) 01/31/2024     No results found for: \"IGE\"  Lab Results   Component Value Date    ALT 21 01/31/2024    AST 21 01/31/2024    ALKPHOS 81 01/31/2024           Sleep studies: I have personally reviewed pertinent reports.    PSG 2020:  events/hr --> severe EVE    Titration 2020: BiPAP 25/20 cmH2O    Compliance report:I have personally reviewed pertinent " "reports.      Type of CPAP:  BiPAP 25/20                                   Percent usage: 100%                                   Average time used: 11 hrs 46 min                                  Time in large leak: 6 min                                   Residual AHI: 1.1               Flako Cash MD  [facility]       Portions of the record may have been created with voice recognition software. Occasional wrong word or \"sound a like\" substitutions may have occurred due to the inherent limitations of voice recognition software. Read the chart carefully and recognize, using context, where substitutions have occurred.  "

## 2024-02-05 NOTE — PATIENT INSTRUCTIONS
BiPAP   AMBULATORY CARE:   Bilevel positive airway pressure (BiPAP)  is a treatment that uses mild air pressure to keep your airways open while you sleep. BiPAP is used to treat obstructive sleep apnea (EVE) in people who cannot tolerate CPAP treatment. It is also used in people with obesity hypoventilation syndrome, central apnea, or restrictive or obstructive lung problems.   Difference between BiPAP and CPAP:  The BiPAP machine delivers a higher amount of air pressure when you breathe in than when you breathe out. CPAP delivers a constant level of air pressure during treatment. In both, the mask connects to the machine with a hose. Air pressure is delivered to the mask through the hose.   Benefits of BiPAP:   Improves quality of sleep     Relieves daytime sleepiness     Improves memory    Reduces the risk of heart disease    Improves your mood and quality of life    Make BiPAP easier to use:   At first, try to use your BiPAP for a few hours every night.  Then slowly increase the length of time you use your machine. It takes time to adjust to BiPAP treatment.    You may need a mask that is a different size, shape, or material.  Talk to your healthcare provider if your mask feels uncomfortable or irritates your skin. You may need to use a special moisturizer made for users.     Use a saline nasal spray at bedtime to help relieve nasal irritation.  A chin strap to help keep your mouth closed or a different type of mask can help dry mouth. Some machines come with a heated humidifier to help relieve these symptoms.    Talk to your healthcare provider if you are having problems adjusting to the air pressure.  He or she can tell you how to adjust the air pressure on your BiPAP. You may need to start at a lower pressure and slowly increase it over time.    Call your healthcare provider for any of the following:   Continued sleepiness during the day, even after wearing your BiPAP device as directed    Continued problems  caused by BiPAP that do not improve    Questions or concerns about your condition, care, or equipment.    Follow up with your healthcare provider as directed:  Tell your healthcare provider if your mask no longer fits properly. Write down your questions so you remember to ask them during your visits.   © Copyright [employer] 2023 Information is for End User's use only and may not be sold, redistributed or otherwise used for commercial purposes.  The above information is an  only. It is not intended as medical advice for individual conditions or treatments. Talk to your doctor, nurse or pharmacist before following any medical regimen to see if it is safe and effective for you.

## 2024-02-09 ENCOUNTER — TELEPHONE (OUTPATIENT)
Dept: SLEEP CENTER | Facility: CLINIC | Age: 44
End: 2024-02-09

## 2024-02-12 LAB

## 2024-02-19 ENCOUNTER — TELEPHONE (OUTPATIENT)
Dept: RHEUMATOLOGY | Facility: CLINIC | Age: 44
End: 2024-02-19

## 2024-02-19 NOTE — TELEPHONE ENCOUNTER
Patient called the RX Refill Line. Message is being forwarded to the office.     Patient is requesting prednisone for gout flare ups. Not on current med list.    Please contact patient at [phone]

## 2024-02-20 DIAGNOSIS — M10.9 ACUTE GOUT, UNSPECIFIED CAUSE, UNSPECIFIED SITE: ICD-10-CM

## 2024-02-20 RX ORDER — PREDNISONE 10 MG/1
TABLET ORAL
Qty: 32 TABLET | Refills: 0 | Status: SHIPPED | OUTPATIENT
Start: 2024-02-20

## 2024-03-13 DIAGNOSIS — R65.10 SIRS (SYSTEMIC INFLAMMATORY RESPONSE SYNDROME) (HCC): ICD-10-CM

## 2024-03-13 RX ORDER — COLCHICINE 0.6 MG/1
0.6 TABLET ORAL DAILY
Qty: 90 TABLET | Refills: 1 | Status: SHIPPED | OUTPATIENT
Start: 2024-03-13 | End: 2024-03-14 | Stop reason: SDUPTHER

## 2024-03-13 NOTE — TELEPHONE ENCOUNTER
Reason for call:   [x] Refill   [] Prior Auth  [] Other:     Office:   [] PCP/Provider -   [x] Specialty/Provider - Rheum Jose J Sanchez    Medication: Colchicine     Dose/Frequency: 0.6 mg Daily    Quantity: 90    [facility]     Does the patient have enough for 3 days?   [] Yes   [x] No - Send as HP to POD

## 2024-03-14 DIAGNOSIS — R65.10 SIRS (SYSTEMIC INFLAMMATORY RESPONSE SYNDROME) (HCC): ICD-10-CM

## 2024-03-14 RX ORDER — COLCHICINE 0.6 MG/1
0.6 TABLET ORAL DAILY
Qty: 90 TABLET | Refills: 1 | Status: SHIPPED | OUTPATIENT
Start: 2024-03-14

## 2024-03-14 NOTE — TELEPHONE ENCOUNTER
NOT A DUPLICATE. PHARMACY CHANGE!    Reason for call:   [x] Refill   [] Prior Auth  [] Other:     Office:   [] PCP/Provider -   [x] Specialty/Provider - Rheum / Jose J Sanchez, DO     Medication: colchicine (COLCRYS) 0.6 mg tablet     Dose/Frequency: Take 1 tablet (0.6 mg total) by mouth daily     Quantity: 90 + 1 refill    Pharmacy: [facility]/pharmacy #[id number] [facility] [place] - [address]     Does the patient have enough for 3 days?   [] Yes   [x] No - Send as HP to POD

## 2024-04-05 ENCOUNTER — TELEPHONE (OUTPATIENT)
Dept: RHEUMATOLOGY | Facility: CLINIC | Age: 44
End: 2024-04-05

## 2024-04-05 DIAGNOSIS — M1A.9XX0 CHRONIC GOUT WITHOUT TOPHUS, UNSPECIFIED CAUSE, UNSPECIFIED SITE: ICD-10-CM

## 2024-04-05 RX ORDER — ALLOPURINOL 300 MG/1
300 TABLET ORAL DAILY
Qty: 90 TABLET | Refills: 1 | Status: SHIPPED | OUTPATIENT
Start: 2024-04-05

## 2024-05-08 DIAGNOSIS — I10 ESSENTIAL HYPERTENSION: ICD-10-CM

## 2024-05-08 DIAGNOSIS — M1A.9XX0 CHRONIC GOUT WITHOUT TOPHUS, UNSPECIFIED CAUSE, UNSPECIFIED SITE: ICD-10-CM

## 2024-05-08 RX ORDER — LOSARTAN POTASSIUM 25 MG/1
25 TABLET ORAL DAILY
Qty: 90 TABLET | Refills: 0 | Status: SHIPPED | OUTPATIENT
Start: 2024-05-08

## 2024-05-08 RX ORDER — ALLOPURINOL 100 MG/1
TABLET ORAL
Qty: 90 TABLET | Refills: 0 | Status: SHIPPED | OUTPATIENT
Start: 2024-05-08

## 2024-06-21 DIAGNOSIS — M1A.9XX0 CHRONIC GOUT WITHOUT TOPHUS, UNSPECIFIED CAUSE, UNSPECIFIED SITE: ICD-10-CM

## 2024-06-21 RX ORDER — ALLOPURINOL 100 MG/1
TABLET ORAL
Qty: 90 TABLET | Refills: 0 | Status: SHIPPED | OUTPATIENT
Start: 2024-06-21

## 2024-07-15 ENCOUNTER — PATIENT MESSAGE (OUTPATIENT)
Dept: RHEUMATOLOGY | Facility: CLINIC | Age: 44
End: 2024-07-15

## 2024-07-15 DIAGNOSIS — I10 ESSENTIAL HYPERTENSION: ICD-10-CM

## 2024-07-16 RX ORDER — AMLODIPINE BESYLATE 10 MG/1
10 TABLET ORAL DAILY
Qty: 24 TABLET | Refills: 0 | Status: SHIPPED | OUTPATIENT
Start: 2024-07-16 | End: 2024-07-17 | Stop reason: SDUPTHER

## 2024-07-16 RX ORDER — LOSARTAN POTASSIUM 50 MG/1
50 TABLET ORAL DAILY
Qty: 24 TABLET | Refills: 0 | Status: SHIPPED | OUTPATIENT
Start: 2024-07-16 | End: 2024-07-17 | Stop reason: SDUPTHER

## 2024-07-17 RX ORDER — LOSARTAN POTASSIUM 50 MG/1
50 TABLET ORAL DAILY
Qty: 24 TABLET | Refills: 0 | Status: SHIPPED | OUTPATIENT
Start: 2024-07-17

## 2024-07-17 RX ORDER — AMLODIPINE BESYLATE 10 MG/1
10 TABLET ORAL DAILY
Qty: 24 TABLET | Refills: 0 | Status: SHIPPED | OUTPATIENT
Start: 2024-07-17

## 2024-07-17 NOTE — PATIENT COMMUNICATION
Patient called asking if  could please send these scripts to his [facility] in [place] instead. He is no longer covered with his insurance to go to [facility]. Please advise

## 2024-08-29 ENCOUNTER — OFFICE VISIT (OUTPATIENT)
Dept: FAMILY MEDICINE CLINIC | Facility: CLINIC | Age: 44
End: 2024-08-29

## 2024-08-29 VITALS
WEIGHT: 235 LBS | HEART RATE: 70 BPM | RESPIRATION RATE: 18 BRPM | DIASTOLIC BLOOD PRESSURE: 88 MMHG | SYSTOLIC BLOOD PRESSURE: 142 MMHG | OXYGEN SATURATION: 98 % | TEMPERATURE: 97.9 F | BODY MASS INDEX: 44.37 KG/M2 | HEIGHT: 61 IN

## 2024-08-29 DIAGNOSIS — M1A.0790 IDIOPATHIC CHRONIC GOUT OF ANKLE WITHOUT TOPHUS, UNSPECIFIED LATERALITY: ICD-10-CM

## 2024-08-29 DIAGNOSIS — E55.9 VITAMIN D DEFICIENCY: ICD-10-CM

## 2024-08-29 DIAGNOSIS — J45.20 MILD INTERMITTENT ASTHMA WITHOUT COMPLICATION: ICD-10-CM

## 2024-08-29 DIAGNOSIS — J30.9 ALLERGIC RHINITIS, UNSPECIFIED SEASONALITY, UNSPECIFIED TRIGGER: ICD-10-CM

## 2024-08-29 DIAGNOSIS — Z13.1 SCREENING FOR DIABETES MELLITUS: ICD-10-CM

## 2024-08-29 DIAGNOSIS — Z00.00 ANNUAL PHYSICAL EXAM: Primary | ICD-10-CM

## 2024-08-29 DIAGNOSIS — I10 ESSENTIAL HYPERTENSION: ICD-10-CM

## 2024-08-29 DIAGNOSIS — R80.9 PROTEINURIA, UNSPECIFIED TYPE: ICD-10-CM

## 2024-08-29 DIAGNOSIS — G47.33 OSA (OBSTRUCTIVE SLEEP APNEA): ICD-10-CM

## 2024-08-29 DIAGNOSIS — E66.01 CLASS 3 SEVERE OBESITY DUE TO EXCESS CALORIES WITH SERIOUS COMORBIDITY AND BODY MASS INDEX (BMI) OF 40.0 TO 44.9 IN ADULT (HCC): ICD-10-CM

## 2024-08-29 PROCEDURE — 99396 PREV VISIT EST AGE 40-64: CPT

## 2024-08-29 PROCEDURE — 99213 OFFICE O/P EST LOW 20 MIN: CPT

## 2024-08-29 RX ORDER — LOSARTAN POTASSIUM 50 MG/1
50 TABLET ORAL DAILY
Qty: 90 TABLET | Refills: 1 | Status: SHIPPED | OUTPATIENT
Start: 2024-08-29

## 2024-08-29 RX ORDER — FLUTICASONE PROPIONATE 50 MCG
2 SPRAY, SUSPENSION (ML) NASAL DAILY
Qty: 48 G | Refills: 1 | Status: SHIPPED | OUTPATIENT
Start: 2024-08-29

## 2024-08-29 RX ORDER — LOSARTAN POTASSIUM 25 MG/1
25 TABLET ORAL DAILY
Qty: 90 TABLET | Refills: 1 | Status: SHIPPED | OUTPATIENT
Start: 2024-08-29

## 2024-08-29 RX ORDER — AMLODIPINE BESYLATE 10 MG/1
10 TABLET ORAL DAILY
Qty: 90 TABLET | Refills: 1 | Status: SHIPPED | OUTPATIENT
Start: 2024-08-29

## 2024-08-29 NOTE — PATIENT INSTRUCTIONS
"Patient Education     Routine physical for adults   The Basics   Written by the doctors and editors at [facility]   What is a physical? -- A physical is a routine visit, or \"check-up,\" with your doctor. You might also hear it called a \"wellness visit\" or \"preventive visit.\"  During each visit, the doctor will:   Ask about your physical and mental health   Ask about your habits, behaviors, and lifestyle   Do an exam   Give you vaccines if needed   Talk to you about any medicines you take   Give advice about your health   Answer your questions  Getting regular check-ups is an important part of taking care of your health. It can help your doctor find and treat any problems you have. But it's also important for preventing health problems.  A routine physical is different from a \"sick visit.\" A sick visit is when you see a doctor because of a health concern or problem. Since physicals are scheduled ahead of time, you can think about what you want to ask the doctor.  How often should I get a physical? -- It depends on your age and health. In general, for people age 21 years and older:   If you are younger than 50 years, you might be able to get a physical every 3 years.   If you are 50 years or older, your doctor might recommend a physical every year.  If you have an ongoing health condition, like diabetes or high blood pressure, your doctor will probably want to see you more often.  What happens during a physical? -- In general, each visit will include:   Physical exam - The doctor or nurse will check your height, weight, heart rate, and blood pressure. They will also look at your eyes and ears. They will ask about how you are feeling and whether you have any symptoms that bother you.   Medicines - It's a good idea to bring a list of all the medicines you take to each doctor visit. Your doctor will talk to you about your medicines and answer any questions. Tell them if you are having any side effects that bother you. You " "should also tell them if you are having trouble paying for any of your medicines.   Habits and behaviors - This includes:   Your diet   Your exercise habits   Whether you smoke, drink alcohol, or use drugs   Whether you are sexually active   Whether you feel safe at home  Your doctor will talk to you about things you can do to improve your health and lower your risk of health problems. They will also offer help and support. For example, if you want to quit smoking, they can give you advice and might prescribe medicines. If you want to improve your diet or get more physical activity, they can help you with this, too.   Lab tests, if needed - The tests you get will depend on your age and situation. For example, your doctor might want to check your:   Cholesterol   Blood sugar   Iron level   Vaccines - The recommended vaccines will depend on your age, health, and what vaccines you already had. Vaccines are very important because they can prevent certain serious or deadly infections.   Discussion of screening - \"Screening\" means checking for diseases or other health problems before they cause symptoms. Your doctor can recommend screening based on your age, risk, and preferences. This might include tests to check for:   Cancer, such as breast, prostate, cervical, ovarian, colorectal, prostate, lung, or skin cancer   Sexually transmitted infections, such as chlamydia and gonorrhea   Mental health conditions like depression and anxiety  Your doctor will talk to you about the different types of screening tests. They can help you decide which screenings to have. They can also explain what the results might mean.   Answering questions - The physical is a good time to ask the doctor or nurse questions about your health. If needed, they can refer you to other doctors or specialists, too.  Adults older than 65 years often need other care, too. As you get older, your doctor will talk to you about:   How to prevent falling at " home   Hearing or vision tests   Memory testing   How to take your medicines safely   Making sure that you have the help and support you need at home  All topics are updated as new evidence becomes available and our peer review process is complete.  This topic retrieved from [employer] on: May 02, 2024.  Topic 264613 Version 1.0  Release: 32.4.3 - C32.122  © 2024 UpToDate, Inc. and/or its affiliates. All rights reserved.  Consumer Information Use and Disclaimer   Disclaimer: This generalized information is a limited summary of diagnosis, treatment, and/or medication information. It is not meant to be comprehensive and should be used as a tool to help the user understand and/or assess potential diagnostic and treatment options. It does NOT include all information about conditions, treatments, medications, side effects, or risks that may apply to a specific patient. It is not intended to be medical advice or a substitute for the medical advice, diagnosis, or treatment of a health care provider based on the health care provider's examination and assessment of a patient's specific and unique circumstances. Patients must speak with a health care provider for complete information about their health, medical questions, and treatment options, including any risks or benefits regarding use of medications. This information does not endorse any treatments or medications as safe, effective, or approved for treating a specific patient. UpToDate, Inc. and its affiliates disclaim any warranty or liability relating to this information or the use thereof.The use of this information is governed by the Terms of Use, available at https://www.woltersCuff-Protectuwer.com/en/know/clinical-effectiveness-terms. 2024© UpToDate, Inc. and its affiliates and/or licensors. All rights reserved.  Copyright   © 2024 UpToDate, Inc. and/or its affiliates. All rights reserved.

## 2024-08-29 NOTE — PROGRESS NOTES
Adult Annual Physical  Name: Pieter Gary      : 1980      MRN: [id number]  Encounter Provider: KATARINA Ledezma  Encounter Date: 2024   Encounter department: [facility]    Assessment & Plan   1. Annual physical exam  2. Essential hypertension  Assessment & Plan:  BP Readings from Last 3 Encounters:   24 142/88   24 116/76   23 128/80   - Not at goal. Reviewed target BP of <140/90.   - Was not taking losartan 25 mg for 2 weeks due to needing a medication refill.   - Continue Continue amlodipine 10 mg and losartan to 75 mg daily.   - Discuss checking home BP  - reviewed eating a low salt diet,  exercising, and wt loss.   Orders:  -     losartan (COZAAR) 50 mg tablet; Take 1 tablet (50 mg total) by mouth daily  -     losartan (COZAAR) 25 mg tablet; Take 1 tablet (25 mg total) by mouth daily  -     amLODIPine (NORVASC) 10 mg tablet; Take 1 tablet (10 mg total) by mouth daily  -     CBC and differential; Future  -     Comprehensive metabolic panel; Future  -     Lipid panel; Future  -     Albumin / creatinine urine ratio; Future  3. Allergic rhinitis, unspecified seasonality, unspecified trigger  -     fluticasone (FLONASE) 50 mcg/act nasal spray; 2 sprays into each nostril daily Shake liquid and spray  4. Mild intermittent asthma without complication  Assessment & Plan:  -stable. Rare BRYAN usage  - Continue albuterol inhaler PRN  5. EVE (obstructive sleep apnea)  Assessment & Plan:  - Using Bipap. Sleep 6-8 hours a day  - Continue plan as per Sleep Medicine.  - Encouraged weight loss.   6. Class 3 severe obesity due to excess calories with serious comorbidity and body mass index (BMI) of 40.0 to 44.9 in adult (HCC)  Assessment & Plan:  Body mass index is 44.4 kg/m².  The BMI is above normal. Nutrition recommendations include reducing portion sizes, decreasing overall calorie intake, 3-5 servings of fruits/vegetables daily, reducing fast food intake,  consuming healthier snacks, decreasing soda and/or juice intake, moderation in carbohydrate intake, increasing intake of lean protein, reducing intake of saturated fat and trans fat and reducing intake of cholesterol. Exercise recommendations include moderate aerobic physical activity for 150 minutes/week.    Orders:  -     CBC and differential; Future  -     Comprehensive metabolic panel; Future  -     Lipid panel; Future  -     Hemoglobin A1C; Future  7. Proteinuria, unspecified type  -     Albumin / creatinine urine ratio; Future  8. Vitamin D deficiency  -     Vitamin D 25 hydroxy; Future  9. Screening for diabetes mellitus  -     Hemoglobin A1C; Future  10. Idiopathic chronic gout of ankle without tophus, unspecified laterality  Assessment & Plan:  - Controlled.  - Continue medications and plan as per Rheumatology.   - Have F/U with rheumatology on 12/20  Immunizations and preventive care screenings were discussed with patient today. Appropriate education was printed on patient's after visit summary.        Counseling:  Alcohol/drug use: discussed moderation in alcohol intake, the recommendations for healthy alcohol use, and avoidance of illicit drug use.  Dental Health: discussed importance of regular tooth brushing, flossing, and dental visits.  Injury prevention: discussed safety/seat belts, safety helmets, smoke detectors, carbon dioxide detectors, and smoking near bedding or upholstery.  Sexual health: discussed sexually transmitted diseases, partner selection, use of condoms, avoidance of unintended pregnancy, and contraceptive alternatives.  Exercise: the importance of regular exercise/physical activity was discussed. Recommend exercise 3-5 times per week for at least 30 minutes.     BMI Counseling: Body mass index is 44.4 kg/m². The BMI is above normal. Nutrition recommendations include decreasing portion sizes, encouraging healthy choices of fruits and vegetables, decreasing fast food intake, consuming  healthier snacks, limiting drinks that contain sugar, moderation in carbohydrate intake, increasing intake of lean protein, reducing intake of saturated and trans fat and reducing intake of cholesterol. Exercise recommendations include exercising 3-5 times per week. No pharmacotherapy was ordered. Rationale for BMI follow-up plan is due to patient being overweight or obese.     Depression Screening and Follow-up Plan: Patient was screened for depression during today's encounter. They screened negative with a PHQ-2 score of 0.        History of Present Illness     Adult Annual Physical:  Patient presents for annual physical. Pieter Gary is a 43 years old male with past medical history of Ankle swelling, Arthritis, Asthma, Dizziness, Heartburn, Hypertension, Increased frequency of urination, Intolerance to heat, Joint pain, Morbid obesity with BMI of 50.0-59.9, adult (HCC), Sleep apnea, and SOB (shortness of breath).     Patient presents for evaluation of hypertension. Reports compliance with amlodipine 10 mg daily and losartan 50 mg daily. Reports he was not taking his losartan 25 mg daily x 2 weeks as he runs out of this medication. Reports checking his home  blood pressure at times and it is usually 140's systolic and 90's diastolic. Reports adding salt on his diet. Patient denies: blurred vision, chest pain, dyspnea, headache, neck aches, orthopnea, palpitations, paroxysmal nocturnal dyspnea, peripheral edema, pulsating in the ears, and tiredness/fatigue. .     Diet and Physical Activity:  - Diet/Nutrition: well balanced diet.  - Exercise: no formal exercise.    Depression Screening:  - PHQ-2 Score: 0    General Health:  - Sleep: sleeps well and 7-8 hours of sleep on average.  - Hearing: normal hearing right ear.  - Vision: no vision problems.  - Dental: regular dental visits and brushes teeth twice daily.     Health:  - History of STDs: no.   - Urinary symptoms: none.     Review of Systems   Constitutional:  "Negative.  Negative for chills and fever.   HENT: Negative.  Negative for ear pain and sore throat.    Eyes: Negative.  Negative for pain and visual disturbance.   Respiratory: Negative.  Negative for cough and shortness of breath.    Cardiovascular: Negative.  Negative for chest pain and palpitations.   Gastrointestinal: Negative.  Negative for abdominal pain and vomiting.   Endocrine: Negative.    Genitourinary: Negative.  Negative for dysuria and hematuria.   Musculoskeletal: Negative.  Negative for arthralgias and back pain.   Skin: Negative.  Negative for color change and rash.   Neurological: Negative.  Negative for seizures and syncope.   Hematological: Negative.    Psychiatric/Behavioral: Negative.     All other systems reviewed and are negative.        Objective     /88 (BP Location: Right arm, Patient Position: Sitting, Cuff Size: Large)   Pulse 70   Temp 97.9 °F (36.6 °C) (Temporal)   Resp 18   Ht 5' 1\" (1.549 m)   Wt 107 kg (235 lb)   SpO2 98%   BMI 44.40 kg/m²     Physical Exam  Vitals and nursing note reviewed.   Constitutional:       General: He is not in acute distress.     Appearance: Normal appearance. He is well-developed. He is obese.   HENT:      Head: Normocephalic and atraumatic.      Right Ear: Tympanic membrane normal.      Left Ear: Tympanic membrane normal.      Nose: Nose normal.      Mouth/Throat:      Mouth: Mucous membranes are moist.      Pharynx: Oropharynx is clear.   Eyes:      Conjunctiva/sclera: Conjunctivae normal.   Cardiovascular:      Rate and Rhythm: Normal rate and regular rhythm.      Pulses: Normal pulses.      Heart sounds: Normal heart sounds. No murmur heard.  Pulmonary:      Effort: Pulmonary effort is normal. No respiratory distress.      Breath sounds: Normal breath sounds.   Abdominal:      General: Abdomen is flat. Bowel sounds are normal.      Palpations: Abdomen is soft.      Tenderness: There is no abdominal tenderness.   Musculoskeletal:         " General: No swelling. Normal range of motion.      Cervical back: Normal range of motion and neck supple.   Skin:     General: Skin is warm and dry.      Capillary Refill: Capillary refill takes less than 2 seconds.   Neurological:      General: No focal deficit present.      Mental Status: He is alert and oriented to person, place, and time. Mental status is at baseline.   Psychiatric:         Mood and Affect: Mood normal.         Behavior: Behavior normal.         Thought Content: Thought content normal.         Judgment: Judgment normal.

## 2024-08-29 NOTE — ASSESSMENT & PLAN NOTE
- Using Bipap. Sleep 6-8 hours a day  - Continue plan as per Sleep Medicine.  - Encouraged weight loss.

## 2024-08-29 NOTE — ASSESSMENT & PLAN NOTE
BP Readings from Last 3 Encounters:   08/29/24 142/88   02/05/24 116/76   11/03/23 128/80   - Not at goal. Reviewed target BP of <140/90.   - Was not taking losartan 25 mg for 2 weeks due to needing a medication refill.   - Continue Continue amlodipine 10 mg and losartan to 75 mg daily.   - Discuss checking home BP  - reviewed eating a low salt diet,  exercising, and wt loss.

## 2024-08-29 NOTE — ASSESSMENT & PLAN NOTE
- Controlled.  - Continue medications and plan as per Rheumatology.   - Have F/U with rheumatology on 12/20

## 2024-08-29 NOTE — ASSESSMENT & PLAN NOTE
Body mass index is 44.4 kg/m².  The BMI is above normal. Nutrition recommendations include reducing portion sizes, decreasing overall calorie intake, 3-5 servings of fruits/vegetables daily, reducing fast food intake, consuming healthier snacks, decreasing soda and/or juice intake, moderation in carbohydrate intake, increasing intake of lean protein, reducing intake of saturated fat and trans fat and reducing intake of cholesterol. Exercise recommendations include moderate aerobic physical activity for 150 minutes/week.

## 2024-09-19 DIAGNOSIS — M1A.9XX0 CHRONIC GOUT WITHOUT TOPHUS, UNSPECIFIED CAUSE, UNSPECIFIED SITE: ICD-10-CM

## 2024-09-19 DIAGNOSIS — R65.10 SIRS (SYSTEMIC INFLAMMATORY RESPONSE SYNDROME) (HCC): ICD-10-CM

## 2024-09-20 RX ORDER — ALLOPURINOL 300 MG/1
300 TABLET ORAL DAILY
Qty: 90 TABLET | Refills: 1 | Status: SHIPPED | OUTPATIENT
Start: 2024-09-20

## 2024-09-20 RX ORDER — COLCHICINE 0.6 MG/1
0.6 TABLET ORAL DAILY
Qty: 90 TABLET | Refills: 1 | Status: SHIPPED | OUTPATIENT
Start: 2024-09-20

## 2024-10-30 DIAGNOSIS — M1A.9XX0 CHRONIC GOUT WITHOUT TOPHUS, UNSPECIFIED CAUSE, UNSPECIFIED SITE: ICD-10-CM

## 2024-10-30 RX ORDER — ALLOPURINOL 100 MG/1
TABLET ORAL
Qty: 90 TABLET | Refills: 0 | Status: SHIPPED | OUTPATIENT
Start: 2024-10-30

## 2024-11-22 ENCOUNTER — RA CDI HCC (OUTPATIENT)
Dept: OTHER | Facility: HOSPITAL | Age: 44
End: 2024-11-22

## 2024-12-20 ENCOUNTER — OFFICE VISIT (OUTPATIENT)
Dept: RHEUMATOLOGY | Facility: CLINIC | Age: 44
End: 2024-12-20
Payer: COMMERCIAL

## 2024-12-20 ENCOUNTER — TELEPHONE (OUTPATIENT)
Dept: PAIN MEDICINE | Facility: CLINIC | Age: 44
End: 2024-12-20

## 2024-12-20 VITALS
BODY MASS INDEX: 46.63 KG/M2 | HEIGHT: 61 IN | WEIGHT: 247 LBS | TEMPERATURE: 98.5 F | HEART RATE: 81 BPM | OXYGEN SATURATION: 99 % | SYSTOLIC BLOOD PRESSURE: 148 MMHG | DIASTOLIC BLOOD PRESSURE: 92 MMHG

## 2024-12-20 DIAGNOSIS — M1A.9XX0 CHRONIC GOUT WITHOUT TOPHUS, UNSPECIFIED CAUSE, UNSPECIFIED SITE: Primary | ICD-10-CM

## 2024-12-20 DIAGNOSIS — M1A.9XX0 CHRONIC GOUT WITHOUT TOPHUS, UNSPECIFIED CAUSE, UNSPECIFIED SITE: ICD-10-CM

## 2024-12-20 DIAGNOSIS — R65.10 SIRS (SYSTEMIC INFLAMMATORY RESPONSE SYNDROME) (HCC): ICD-10-CM

## 2024-12-20 PROCEDURE — 99214 OFFICE O/P EST MOD 30 MIN: CPT | Performed by: STUDENT IN AN ORGANIZED HEALTH CARE EDUCATION/TRAINING PROGRAM

## 2024-12-20 RX ORDER — ALLOPURINOL 100 MG/1
TABLET ORAL
Qty: 90 TABLET | Refills: 2 | Status: SHIPPED | OUTPATIENT
Start: 2024-12-20

## 2024-12-20 RX ORDER — COLCHICINE 0.6 MG/1
0.6 TABLET ORAL DAILY
Qty: 90 TABLET | Refills: 1 | Status: SHIPPED | OUTPATIENT
Start: 2024-12-20

## 2024-12-20 NOTE — PROGRESS NOTES
"Name: Pieter Gary      : 1980      MRN: [id number]  Encounter Provider: Jose J Sanchez DO  Encounter Date: 2024   Encounter department: [facility]  :  Assessment & Plan  Chronic gout without tophus, unspecified cause, unspecified site  Recheck labs, if sUA remains below 6 then can STOP colchicine. If above 6 will INCREASE allopurinol to 500 mg daily and CONTINUE colchicine    Continue 400 mg allopurinol daily for now  Orders:    Hepatic function panel; Standing    Uric acid; Standing    Creatinine, serum; Standing      Patient's rheumatologic disease(s) threaten long-term function if not appropriately managed.    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   Eyes: Sclera non-icteric. EOMI  Extremities: Warm, well perfused, no edema.   Neuro: Alert and oriented. No gross focal neurological deficits.   Skin: No rashes.  MSK exam: no tenderness to palpation or synovitis any peripheral joint       Latest Reference Range & Units 24 15:44   Creatinine 0.60 - 1.30 mg/dL 1.39 (H)   AST 13 - 39 U/L 21   ALT 7 - 52 U/L 21   ALK PHOS 34 - 104 U/L 81   GFR, Calculated ml/min/1.73sq m 62   URIC ACID 3.5 - 8.5 mg/dL 5.6   (H): Data is abnormally high  "

## 2024-12-20 NOTE — TELEPHONE ENCOUNTER
Patient forgot to inform you he needs refills on   allopurinol (ZYLOPRIM) 100 mg tablet   colchicine (COLCRYS) 0.6 mg tablet     [facility] [address]

## 2025-01-02 ENCOUNTER — RESULTS FOLLOW-UP (OUTPATIENT)
Dept: RHEUMATOLOGY | Facility: CLINIC | Age: 45
End: 2025-01-02

## 2025-01-02 ENCOUNTER — APPOINTMENT (OUTPATIENT)
Dept: LAB | Facility: AMBULARY SURGERY CENTER | Age: 45
End: 2025-01-02
Payer: COMMERCIAL

## 2025-01-02 ENCOUNTER — RESULTS FOLLOW-UP (OUTPATIENT)
Dept: FAMILY MEDICINE CLINIC | Facility: CLINIC | Age: 45
End: 2025-01-02

## 2025-01-02 DIAGNOSIS — I10 ESSENTIAL HYPERTENSION: ICD-10-CM

## 2025-01-02 DIAGNOSIS — E66.813 CLASS 3 SEVERE OBESITY DUE TO EXCESS CALORIES WITH SERIOUS COMORBIDITY AND BODY MASS INDEX (BMI) OF 40.0 TO 44.9 IN ADULT (HCC): ICD-10-CM

## 2025-01-02 DIAGNOSIS — M1A.9XX0 CHRONIC GOUT WITHOUT TOPHUS, UNSPECIFIED CAUSE, UNSPECIFIED SITE: ICD-10-CM

## 2025-01-02 DIAGNOSIS — E55.9 VITAMIN D DEFICIENCY: ICD-10-CM

## 2025-01-02 DIAGNOSIS — R80.9 PROTEINURIA, UNSPECIFIED TYPE: ICD-10-CM

## 2025-01-02 DIAGNOSIS — E66.01 CLASS 3 SEVERE OBESITY DUE TO EXCESS CALORIES WITH SERIOUS COMORBIDITY AND BODY MASS INDEX (BMI) OF 40.0 TO 44.9 IN ADULT (HCC): ICD-10-CM

## 2025-01-02 DIAGNOSIS — Z13.1 SCREENING FOR DIABETES MELLITUS: ICD-10-CM

## 2025-01-02 LAB
25(OH)D3 SERPL-MCNC: 15 NG/ML (ref 30–100)
ALBUMIN SERPL BCG-MCNC: 4.6 G/DL (ref 3.5–5)
ALP SERPL-CCNC: 81 U/L (ref 34–104)
ALT SERPL W P-5'-P-CCNC: 27 U/L (ref 7–52)
ANION GAP SERPL CALCULATED.3IONS-SCNC: 11 MMOL/L (ref 4–13)
AST SERPL W P-5'-P-CCNC: 24 U/L (ref 13–39)
BASOPHILS # BLD AUTO: 0.08 THOUSANDS/ΜL (ref 0–0.1)
BASOPHILS NFR BLD AUTO: 1 % (ref 0–1)
BILIRUB SERPL-MCNC: 0.36 MG/DL (ref 0.2–1)
BUN SERPL-MCNC: 21 MG/DL (ref 5–25)
CALCIUM SERPL-MCNC: 10 MG/DL (ref 8.4–10.2)
CHLORIDE SERPL-SCNC: 102 MMOL/L (ref 96–108)
CO2 SERPL-SCNC: 24 MMOL/L (ref 21–32)
CREAT SERPL-MCNC: 1.24 MG/DL (ref 0.6–1.3)
CREAT UR-MCNC: 88 MG/DL
EOSINOPHIL # BLD AUTO: 0.74 THOUSAND/ΜL (ref 0–0.61)
EOSINOPHIL NFR BLD AUTO: 6 % (ref 0–6)
ERYTHROCYTE [DISTWIDTH] IN BLOOD BY AUTOMATED COUNT: 13.6 % (ref 11.6–15.1)
EST. AVERAGE GLUCOSE BLD GHB EST-MCNC: 117 MG/DL
GFR SERPL CREATININE-BSD FRML MDRD: 71 ML/MIN/1.73SQ M
GLUCOSE SERPL-MCNC: 91 MG/DL (ref 65–140)
HBA1C MFR BLD: 5.7 %
HCT VFR BLD AUTO: 53 % (ref 36.5–49.3)
HGB BLD-MCNC: 17.5 G/DL (ref 12–17)
IMM GRANULOCYTES # BLD AUTO: 0.08 THOUSAND/UL (ref 0–0.2)
IMM GRANULOCYTES NFR BLD AUTO: 1 % (ref 0–2)
LYMPHOCYTES # BLD AUTO: 3.48 THOUSANDS/ΜL (ref 0.6–4.47)
LYMPHOCYTES NFR BLD AUTO: 28 % (ref 14–44)
MCH RBC QN AUTO: 28.9 PG (ref 26.8–34.3)
MCHC RBC AUTO-ENTMCNC: 33 G/DL (ref 31.4–37.4)
MCV RBC AUTO: 88 FL (ref 82–98)
MICROALBUMIN UR-MCNC: 201.1 MG/L
MICROALBUMIN/CREAT 24H UR: 229 MG/G CREATININE (ref 0–30)
MONOCYTES # BLD AUTO: 0.79 THOUSAND/ΜL (ref 0.17–1.22)
MONOCYTES NFR BLD AUTO: 6 % (ref 4–12)
NEUTROPHILS # BLD AUTO: 7.3 THOUSANDS/ΜL (ref 1.85–7.62)
NEUTS SEG NFR BLD AUTO: 58 % (ref 43–75)
NRBC BLD AUTO-RTO: 0 /100 WBCS
PLATELET # BLD AUTO: 329 THOUSANDS/UL (ref 149–390)
PMV BLD AUTO: 10.5 FL (ref 8.9–12.7)
POTASSIUM SERPL-SCNC: 3.7 MMOL/L (ref 3.5–5.3)
PROT SERPL-MCNC: 8.3 G/DL (ref 6.4–8.4)
RBC # BLD AUTO: 6.06 MILLION/UL (ref 3.88–5.62)
SODIUM SERPL-SCNC: 137 MMOL/L (ref 135–147)
WBC # BLD AUTO: 12.47 THOUSAND/UL (ref 4.31–10.16)

## 2025-01-02 PROCEDURE — 82570 ASSAY OF URINE CREATININE: CPT

## 2025-01-02 PROCEDURE — 82043 UR ALBUMIN QUANTITATIVE: CPT

## 2025-01-02 PROCEDURE — 83036 HEMOGLOBIN GLYCOSYLATED A1C: CPT

## 2025-01-02 PROCEDURE — 80053 COMPREHEN METABOLIC PANEL: CPT

## 2025-01-02 PROCEDURE — 82306 VITAMIN D 25 HYDROXY: CPT

## 2025-01-02 PROCEDURE — 36415 COLL VENOUS BLD VENIPUNCTURE: CPT

## 2025-01-02 PROCEDURE — 85025 COMPLETE CBC W/AUTO DIFF WBC: CPT

## 2025-01-02 NOTE — RESULT ENCOUNTER NOTE
Please instruct him to increase allopurinol to 500 mg daily (one 300 mg pill and two 100 mg pills) and recheck labs in 2-3 weeks

## 2025-01-06 ENCOUNTER — OFFICE VISIT (OUTPATIENT)
Dept: FAMILY MEDICINE CLINIC | Facility: CLINIC | Age: 45
End: 2025-01-06

## 2025-01-06 ENCOUNTER — TELEPHONE (OUTPATIENT)
Dept: FAMILY MEDICINE CLINIC | Facility: CLINIC | Age: 45
End: 2025-01-06

## 2025-01-06 VITALS
BODY MASS INDEX: 46.92 KG/M2 | TEMPERATURE: 97.7 F | HEART RATE: 74 BPM | DIASTOLIC BLOOD PRESSURE: 82 MMHG | RESPIRATION RATE: 18 BRPM | WEIGHT: 248.5 LBS | HEIGHT: 61 IN | SYSTOLIC BLOOD PRESSURE: 132 MMHG | OXYGEN SATURATION: 96 %

## 2025-01-06 DIAGNOSIS — E55.9 VITAMIN D DEFICIENCY: ICD-10-CM

## 2025-01-06 DIAGNOSIS — I10 ESSENTIAL HYPERTENSION: Primary | ICD-10-CM

## 2025-01-06 DIAGNOSIS — E66.01 CLASS 3 SEVERE OBESITY DUE TO EXCESS CALORIES WITH SERIOUS COMORBIDITY AND BODY MASS INDEX (BMI) OF 45.0 TO 49.9 IN ADULT (HCC): ICD-10-CM

## 2025-01-06 DIAGNOSIS — J30.9 ALLERGIC RHINITIS, UNSPECIFIED SEASONALITY, UNSPECIFIED TRIGGER: ICD-10-CM

## 2025-01-06 DIAGNOSIS — E66.813 CLASS 3 SEVERE OBESITY DUE TO EXCESS CALORIES WITH SERIOUS COMORBIDITY AND BODY MASS INDEX (BMI) OF 45.0 TO 49.9 IN ADULT (HCC): ICD-10-CM

## 2025-01-06 PROCEDURE — 99214 OFFICE O/P EST MOD 30 MIN: CPT

## 2025-01-06 RX ORDER — ERGOCALCIFEROL 1.25 MG/1
50000 CAPSULE, LIQUID FILLED ORAL WEEKLY
Qty: 12 CAPSULE | Refills: 1 | Status: SHIPPED | OUTPATIENT
Start: 2025-01-06

## 2025-01-06 RX ORDER — MONTELUKAST SODIUM 10 MG/1
10 TABLET ORAL
Qty: 90 TABLET | Refills: 1 | Status: SHIPPED | OUTPATIENT
Start: 2025-01-06

## 2025-01-06 RX ORDER — LOSARTAN POTASSIUM 25 MG/1
25 TABLET ORAL DAILY
Qty: 90 TABLET | Refills: 1 | Status: SHIPPED | OUTPATIENT
Start: 2025-01-06

## 2025-01-06 RX ORDER — LOSARTAN POTASSIUM 50 MG/1
50 TABLET ORAL DAILY
Qty: 90 TABLET | Refills: 1 | Status: SHIPPED | OUTPATIENT
Start: 2025-01-06

## 2025-01-06 RX ORDER — FLUTICASONE PROPIONATE 50 MCG
2 SPRAY, SUSPENSION (ML) NASAL DAILY
Qty: 48 G | Refills: 1 | Status: SHIPPED | OUTPATIENT
Start: 2025-01-06

## 2025-01-06 NOTE — ASSESSMENT & PLAN NOTE
BP Readings from Last 3 Encounters:   01/06/25 132/82   12/20/24 148/92   08/29/24 142/88   - At goal  -Continue amlodipine 10 mg and losartan to 75 mg daily.   -Reports compliance with medications.  - eating a low salt diet,  exercising, and weight loss.     Orders:    losartan (COZAAR) 50 mg tablet; Take 1 tablet (50 mg total) by mouth daily    losartan (COZAAR) 25 mg tablet; Take 1 tablet (25 mg total) by mouth daily

## 2025-01-06 NOTE — TELEPHONE ENCOUNTER
----- Message from KATARINA Ledezma sent at 1/6/2025 10:39 AM EST -----  Regarding: prior auth  Patient needs a prior authorization placed for Wegovy initiated via [employer] or [employer]. Patient has failed traditional weight loss recommendations for this repeatedly over the past 6 months. Weight loss interventions include traditional calorie restriction dieting, and moderate intensity exercise per guideline recommendations. Patient meets criteria for this drug because BMI is greater than 30    Place complete prior authorization and document completion with KEY reference in this encounter    Thank you

## 2025-01-06 NOTE — ASSESSMENT & PLAN NOTE
Body mass index is 46.95 kg/m².  - Patient reports trying lifestyle modification for over 6 months which is ineffective.   - He wants to try Wegovy to aid with weight loss.   - Will start with wegovy 0.25 mg/weekly for 4 weeks. Plan to increase to 0.5mg  - Discuss importance of healthy diet and physical therapy.   -  to report S/E.   - Will follow up in 6 weeks.         Orders:    Semaglutide-Weight Management (WEGOVY) 0.25 MG/0.5ML; Inject 0.5 mL (0.25 mg total) under the skin once a week for 28 days    Semaglutide-Weight Management (WEGOVY) 0.5 MG/0.5ML; Inject 0.5 mL (0.5 mg total) under the skin once a week for 28 days Do not start before February 3, 2025.    Semaglutide-Weight Management (WEGOVY) 1 MG/0.5ML; Inject 0.5 mL (1 mg total) under the skin once a week for 28 days Do not start before March 3, 2025.    Semaglutide-Weight Management (WEGOVY) 1.7 MG/0.75ML; Inject 0.75 mL (1.7 mg total) under the skin once a week for 28 days Do not start before March 31, 2025.    Semaglutide-Weight Management (WEGOVY) 2.4 MG/0.75ML; Inject 0.75 mL (2.4 mg total) under the skin once a week for 28 days Do not start before April 28, 2025.

## 2025-01-06 NOTE — PROGRESS NOTES
Name: Pieter Gary      : 1980      MRN: [id number]  Encounter Provider: KATARINA Ledezma  Encounter Date: 2025   Encounter department: [facility] [place]  :  Assessment & Plan  Essential hypertension  BP Readings from Last 3 Encounters:   25 132/82   24 148/92   24 142/88   - At goal  -Continue amlodipine 10 mg and losartan to 75 mg daily.   -Reports compliance with medications.  - eating a low salt diet,  exercising, and weight loss.     Orders:    losartan (COZAAR) 50 mg tablet; Take 1 tablet (50 mg total) by mouth daily    losartan (COZAAR) 25 mg tablet; Take 1 tablet (25 mg total) by mouth daily    Class 3 severe obesity due to excess calories with serious comorbidity and body mass index (BMI) of 45.0 to 49.9 in adult (HCC)  Body mass index is 46.95 kg/m².  - Patient reports trying lifestyle modification for over 6 months which is ineffective.   - He wants to try Wegovy to aid with weight loss.   - Will start with wegovy 0.25 mg/weekly for 4 weeks. Plan to increase to 0.5mg  - Discuss importance of healthy diet and physical therapy.   -  to report S/E.   - Will follow up in 6 weeks.         Orders:    Semaglutide-Weight Management (WEGOVY) 0.25 MG/0.5ML; Inject 0.5 mL (0.25 mg total) under the skin once a week for 28 days    Semaglutide-Weight Management (WEGOVY) 0.5 MG/0.5ML; Inject 0.5 mL (0.5 mg total) under the skin once a week for 28 days Do not start before February 3, 2025.    Semaglutide-Weight Management (WEGOVY) 1 MG/0.5ML; Inject 0.5 mL (1 mg total) under the skin once a week for 28 days Do not start before March 3, 2025.    Semaglutide-Weight Management (WEGOVY) 1.7 MG/0.75ML; Inject 0.75 mL (1.7 mg total) under the skin once a week for 28 days Do not start before 2025.    Semaglutide-Weight Management (WEGOVY) 2.4 MG/0.75ML; Inject 0.75 mL (2.4 mg total) under the skin once a week for 28 days Do not start before  "April 28, 2025.    Vitamin D deficiency    Orders:    ergocalciferol (VITAMIN D2) 50,000 units; Take 1 capsule (50,000 Units total) by mouth once a week    Allergic rhinitis, unspecified seasonality, unspecified trigger    Orders:    montelukast (SINGULAIR) 10 mg tablet; Take 1 tablet (10 mg total) by mouth daily at bedtime    fluticasone (FLONASE) 50 mcg/act nasal spray; 2 sprays into each nostril daily Shake liquid and spray           History of Present Illness     Pieter Gary is a 44 y.o. with  has a past medical history of Ankle swelling, Arthritis, Asthma, Dizziness, Heartburn, Hypertension, Increased frequency of urination, Intolerance to heat, Joint pain, Morbid obesity with BMI of 50.0-59.9, adult (HCC), Sleep apnea, and SOB (shortness of breath).     Patient is here for hypertension follow up. Patient reports compliance with amlodipine 10 mg and losartan to 75 mg daily. Patient denies any acute complains.       Review of Systems   Constitutional: Negative.  Negative for chills and fever.   HENT: Negative.  Negative for ear pain and sore throat.    Eyes: Negative.  Negative for pain and visual disturbance.   Respiratory: Negative.  Negative for cough and shortness of breath.    Cardiovascular: Negative.  Negative for chest pain and palpitations.   Gastrointestinal: Negative.  Negative for abdominal pain and vomiting.   Genitourinary: Negative.  Negative for dysuria and hematuria.   Musculoskeletal: Negative.  Negative for arthralgias and back pain.   Skin: Negative.  Negative for color change and rash.   Neurological: Negative.  Negative for seizures and syncope.   Hematological: Negative.    Psychiatric/Behavioral: Negative.     All other systems reviewed and are negative.      Objective   /82 (BP Location: Left arm, Patient Position: Sitting, Cuff Size: Large)   Pulse 74   Temp 97.7 °F (36.5 °C) (Temporal)   Resp 18   Ht 5' 1\" (1.549 m)   Wt 113 kg (248 lb 8 oz)   SpO2 96%   BMI 46.95 kg/m² "      Physical Exam  Vitals and nursing note reviewed.   Constitutional:       General: He is not in acute distress.     Appearance: Normal appearance. He is well-developed. He is obese.   HENT:      Head: Normocephalic and atraumatic.      Right Ear: Tympanic membrane normal.      Left Ear: Tympanic membrane normal.      Nose: Nose normal.      Mouth/Throat:      Mouth: Mucous membranes are moist.      Pharynx: Oropharynx is clear.   Eyes:      Conjunctiva/sclera: Conjunctivae normal.   Cardiovascular:      Rate and Rhythm: Normal rate and regular rhythm.      Pulses: Normal pulses.      Heart sounds: Normal heart sounds. No murmur heard.  Pulmonary:      Effort: Pulmonary effort is normal. No respiratory distress.      Breath sounds: Normal breath sounds.   Abdominal:      General: Abdomen is flat. Bowel sounds are normal.      Palpations: Abdomen is soft.      Tenderness: There is no abdominal tenderness.   Musculoskeletal:         General: No swelling. Normal range of motion.      Cervical back: Normal range of motion and neck supple.   Skin:     General: Skin is warm and dry.      Capillary Refill: Capillary refill takes less than 2 seconds.   Neurological:      General: No focal deficit present.      Mental Status: He is alert and oriented to person, place, and time. Mental status is at baseline.   Psychiatric:         Mood and Affect: Mood normal.         Behavior: Behavior normal.         Thought Content: Thought content normal.         Judgment: Judgment normal.

## 2025-01-06 NOTE — TELEPHONE ENCOUNTER
Pt saw KATARINA Rowland on 1/6/24.       ergocalciferol (VITAMIN D2) 50,000 units  was sent to [facility] in [place] during this visit.

## 2025-01-06 NOTE — ASSESSMENT & PLAN NOTE
Orders:    montelukast (SINGULAIR) 10 mg tablet; Take 1 tablet (10 mg total) by mouth daily at bedtime    fluticasone (FLONASE) 50 mcg/act nasal spray; 2 sprays into each nostril daily Shake liquid and spray

## 2025-01-07 RX ORDER — ALLOPURINOL 100 MG/1
TABLET ORAL
Qty: 90 TABLET | Refills: 2 | Status: SHIPPED | OUTPATIENT
Start: 2025-01-07

## 2025-01-07 NOTE — TELEPHONE ENCOUNTER
Pt stating [facility] pharmacy does not have the updated Allopurinol script/ asking if Dr. LARKIN could send that to [facility]     Pt stating he did receive the prior RN messages.

## 2025-01-31 DIAGNOSIS — M10.9 ACUTE GOUT, UNSPECIFIED CAUSE, UNSPECIFIED SITE: ICD-10-CM

## 2025-01-31 RX ORDER — PREDNISONE 5 MG/1
TABLET ORAL
Qty: 40 TABLET | Refills: 0 | Status: SHIPPED | OUTPATIENT
Start: 2025-01-31 | End: 2025-02-15

## 2025-01-31 RX ORDER — PREDNISONE 10 MG/1
TABLET ORAL
Qty: 32 TABLET | Refills: 0 | Status: CANCELLED | OUTPATIENT
Start: 2025-01-31

## 2025-01-31 NOTE — TELEPHONE ENCOUNTER
Refill must be reviewed and completed by the office or provider. The refill is unable to be approved or denied by the medication management team.    Patient comment: I started getting a flare up after the new dose for allopurinol

## 2025-02-13 DIAGNOSIS — M1A.9XX0 CHRONIC GOUT WITHOUT TOPHUS, UNSPECIFIED CAUSE, UNSPECIFIED SITE: ICD-10-CM

## 2025-02-13 RX ORDER — ALLOPURINOL 100 MG/1
TABLET ORAL
Qty: 180 TABLET | Refills: 1 | Status: SHIPPED | OUTPATIENT
Start: 2025-02-13

## 2025-03-03 DIAGNOSIS — I10 ESSENTIAL HYPERTENSION: ICD-10-CM

## 2025-03-04 RX ORDER — AMLODIPINE BESYLATE 10 MG/1
10 TABLET ORAL DAILY
Qty: 90 TABLET | Refills: 0 | Status: SHIPPED | OUTPATIENT
Start: 2025-03-04

## 2025-03-20 DIAGNOSIS — M1A.9XX0 CHRONIC GOUT WITHOUT TOPHUS, UNSPECIFIED CAUSE, UNSPECIFIED SITE: ICD-10-CM

## 2025-03-21 RX ORDER — ALLOPURINOL 300 MG/1
300 TABLET ORAL DAILY
Qty: 90 TABLET | Refills: 0 | OUTPATIENT
Start: 2025-03-21

## 2025-03-21 RX ORDER — ALLOPURINOL 300 MG/1
300 TABLET ORAL DAILY
Qty: 90 TABLET | Refills: 1 | Status: SHIPPED | OUTPATIENT
Start: 2025-03-21

## 2025-06-04 DIAGNOSIS — I10 ESSENTIAL HYPERTENSION: ICD-10-CM

## 2025-06-04 DIAGNOSIS — M1A.9XX0 CHRONIC GOUT WITHOUT TOPHUS, UNSPECIFIED CAUSE, UNSPECIFIED SITE: ICD-10-CM

## 2025-06-04 RX ORDER — AMLODIPINE BESYLATE 10 MG/1
10 TABLET ORAL DAILY
Qty: 90 TABLET | Refills: 0 | Status: SHIPPED | OUTPATIENT
Start: 2025-06-04

## 2025-06-04 RX ORDER — ALLOPURINOL 300 MG/1
300 TABLET ORAL DAILY
Qty: 90 TABLET | Refills: 1 | Status: SHIPPED | OUTPATIENT
Start: 2025-06-04

## 2025-06-09 ENCOUNTER — TELEPHONE (OUTPATIENT)
Dept: FAMILY MEDICINE CLINIC | Facility: CLINIC | Age: 45
End: 2025-06-09

## 2025-06-09 DIAGNOSIS — E66.01 MORBID OBESITY WITH BMI OF 45.0-49.9, ADULT (HCC): Primary | ICD-10-CM

## 2025-06-09 PROBLEM — R73.03 PREDIABETES: Status: ACTIVE | Noted: 2025-06-09

## 2025-06-09 RX ORDER — SEMAGLUTIDE 0.25 MG/.5ML
INJECTION, SOLUTION SUBCUTANEOUS
Qty: 2 ML | Refills: 0 | Status: SHIPPED | OUTPATIENT
Start: 2025-06-09

## 2025-06-09 RX ORDER — SEMAGLUTIDE 0.5 MG/.5ML
INJECTION, SOLUTION SUBCUTANEOUS
Qty: 2 ML | Refills: 0 | Status: SHIPPED | OUTPATIENT
Start: 2025-07-09

## 2025-06-09 NOTE — PROGRESS NOTES
Name: Pieter Gary      : 1980      MRN: [id number]  Encounter Provider: KATARINA Ledezma  Encounter Date: 2025   Encounter department: [facility] [place]  :  Assessment & Plan  Morbid obesity with BMI of 45.0-49.9, adult (HCC)  Prior Authorization Clinical Questions for Weight Management Pharmacotherapy    1. Does the patient have a contrainidcation to medication prescribed for weight management?: No  2. Does the patient have a diagnosis of obesity, confirmed by a BMI greater than or equal to 30 kg/m^2?: Yes  3. Does the patient have a BMI of greater than or equal to 27 kg/m^2 with at least one weight-related comorbidity/risk factor/complication (e.g. diabetes, dyslipidemia, coronary artery disease)?: Yes  4. Weight-related co-morbidities/risk factors: prediabetes, dyslipidemia, hypertension, asthma/reactive airway disease  5. WEGOVY CVA Indication: Does patient have established documented cardiovascular disease (history of a prior heart attack (myocardial infarction), stroke, or symptomatic peripheral arterial disease (PAD)?: No  6. ZEPBOUND EVE Indication: Does patient have documented EVE diagnosed via sleep study (insurance will require copy of sleep study results for approval)?: No  7. Has the patient been on a weight loss regimen of low-calorie diet, increased physical activity, and lifestyle modifications for a minimum of 6 months?: Yes  8. Has the patient completed a comprehensive weight loss program (ie, Weight Watchers, Noom, Bariatrics, other jorge luis on phone)? If so, what?: No  9. Does the patient have a history of type 2 diabetes?: No  10. Has the member tried and failed other weight loss medication within the past 12 months?: No  11. Will the member use requested medication in combination with another GLP agonist or weight loss drug?: No  12. Is the medication a controlled substance?: No  For renewals: Has the patient had a positive outcome with current weight  management medication (i.e., change in body weight of at least 4-5% after 12-16 weeks on maximally tolerated dose)?: Yes     Baseline weight (in pounds): 248.5 lbs  Current weight (in pounds): 248.5 lbs  Weight loss percentage: 0%       - He wants to try Wegovy to aid with weight loss.   - Will start with wegovy 0.25 mg/weekly for 4 weeks. Plan to increase to 0.5mg  - Discuss importance of healthy diet and physical exercises.   -  about possible S/E and  to report S/E.   - Will follow up in 6 weeks.        Orders:    Semaglutide-Weight Management (Wegovy) 0.25 MG/0.5ML; Inject 0.25 mg under the skin weekly    Semaglutide-Weight Management (Wegovy) 0.5 MG/0.5ML; Inject 0.5 mg under the skin weekly Do not start before July 9, 2025.           History of Present Illness {?Quick Links Encounters * My Last Note * Last Note in Specialty * Snapshot * Since Last Visit * History :26387}  HPI  Review of Systems    Objective {?Quick Links Trend Vitals * Enter New Vitals * Results Review * Timeline (Adult) * Labs * Imaging * Cardiology * Procedures * Lung Cancer Screening * Surgical eConsent :83784}  There were no vitals taken for this visit.     Physical Exam  {Administrative / Billing Section (Optional):44363}

## 2025-06-09 NOTE — ASSESSMENT & PLAN NOTE
Prior Authorization Clinical Questions for Weight Management Pharmacotherapy    1. Does the patient have a contrainidcation to medication prescribed for weight management?: No  2. Does the patient have a diagnosis of obesity, confirmed by a BMI greater than or equal to 30 kg/m^2?: Yes  3. Does the patient have a BMI of greater than or equal to 27 kg/m^2 with at least one weight-related comorbidity/risk factor/complication (e.g. diabetes, dyslipidemia, coronary artery disease)?: Yes  4. Weight-related co-morbidities/risk factors: prediabetes, dyslipidemia, hypertension, asthma/reactive airway disease  5. WEGOVY CVA Indication: Does patient have established documented cardiovascular disease (history of a prior heart attack (myocardial infarction), stroke, or symptomatic peripheral arterial disease (PAD)?: No  6. ZEPBOUND EVE Indication: Does patient have documented EVE diagnosed via sleep study (insurance will require copy of sleep study results for approval)?: No  7. Has the patient been on a weight loss regimen of low-calorie diet, increased physical activity, and lifestyle modifications for a minimum of 6 months?: Yes  8. Has the patient completed a comprehensive weight loss program (ie, Weight Watchers, Noom, Bariatrics, other jorge luis on phone)? If so, what?: No  9. Does the patient have a history of type 2 diabetes?: No  10. Has the member tried and failed other weight loss medication within the past 12 months?: No  11. Will the member use requested medication in combination with another GLP agonist or weight loss drug?: No  12. Is the medication a controlled substance?: No  For renewals: Has the patient had a positive outcome with current weight management medication (i.e., change in body weight of at least 4-5% after 12-16 weeks on maximally tolerated dose)?: Yes     Baseline weight (in pounds): 248.5 lbs  Current weight (in pounds): 248.5 lbs  Weight loss percentage: 0%     - He wants to try Wegovy to aid with  weight loss.   - Will start with wegovy 0.25 mg/weekly for 4 weeks. Plan to increase to 0.5mg  - Discuss importance of healthy diet and physical exercises.   -  about possible S/E and  to report S/E.   - Will follow up in 6 weeks.

## 2025-06-15 DIAGNOSIS — R65.10 SIRS (SYSTEMIC INFLAMMATORY RESPONSE SYNDROME) (HCC): ICD-10-CM

## 2025-06-16 RX ORDER — COLCHICINE 0.6 MG/1
0.6 TABLET ORAL DAILY
Qty: 90 TABLET | Refills: 1 | Status: SHIPPED | OUTPATIENT
Start: 2025-06-16

## 2025-06-16 NOTE — TELEPHONE ENCOUNTER
See Jan 2nd result note, patient never got labs rechecked, please have him get labs rechecked ASAP so we can adjust allopurinol dose if necessary